# Patient Record
Sex: MALE | Race: WHITE | NOT HISPANIC OR LATINO
[De-identification: names, ages, dates, MRNs, and addresses within clinical notes are randomized per-mention and may not be internally consistent; named-entity substitution may affect disease eponyms.]

---

## 2017-08-22 ENCOUNTER — APPOINTMENT (OUTPATIENT)
Dept: UROLOGY | Facility: CLINIC | Age: 67
End: 2017-08-22
Payer: MEDICARE

## 2017-08-22 VITALS
BODY MASS INDEX: 23.86 KG/M2 | SYSTOLIC BLOOD PRESSURE: 114 MMHG | DIASTOLIC BLOOD PRESSURE: 72 MMHG | WEIGHT: 180 LBS | TEMPERATURE: 98.2 F | OXYGEN SATURATION: 98 % | HEART RATE: 97 BPM | HEIGHT: 73 IN

## 2017-08-22 DIAGNOSIS — Z86.39 PERSONAL HISTORY OF OTHER ENDOCRINE, NUTRITIONAL AND METABOLIC DISEASE: ICD-10-CM

## 2017-08-22 DIAGNOSIS — R39.12 POOR URINARY STREAM: ICD-10-CM

## 2017-08-22 DIAGNOSIS — R35.1 NOCTURIA: ICD-10-CM

## 2017-08-22 PROCEDURE — 99204 OFFICE O/P NEW MOD 45 MIN: CPT

## 2017-08-23 LAB
BILIRUB UR QL STRIP: NEGATIVE
CLARITY UR: CLEAR
COLLECTION METHOD: NORMAL
GLUCOSE UR-MCNC: NEGATIVE
HCG UR QL: 1 EU/DL
HGB UR QL STRIP.AUTO: NEGATIVE
KETONES UR-MCNC: NEGATIVE
LEUKOCYTE ESTERASE UR QL STRIP: NEGATIVE
NITRITE UR QL STRIP: NEGATIVE
PH UR STRIP: 7
PROT UR STRIP-MCNC: NEGATIVE
SP GR UR STRIP: 1.02

## 2017-09-19 ENCOUNTER — TRANSCRIPTION ENCOUNTER (OUTPATIENT)
Age: 67
End: 2017-09-19

## 2017-09-19 ENCOUNTER — APPOINTMENT (OUTPATIENT)
Dept: UROLOGY | Facility: CLINIC | Age: 67
End: 2017-09-19
Payer: MEDICARE

## 2017-09-19 VITALS
SYSTOLIC BLOOD PRESSURE: 103 MMHG | TEMPERATURE: 98 F | HEART RATE: 67 BPM | DIASTOLIC BLOOD PRESSURE: 69 MMHG | OXYGEN SATURATION: 100 %

## 2017-09-19 DIAGNOSIS — Z78.9 OTHER SPECIFIED HEALTH STATUS: ICD-10-CM

## 2017-09-19 PROCEDURE — 99215 OFFICE O/P EST HI 40 MIN: CPT

## 2017-09-19 PROCEDURE — 51798 US URINE CAPACITY MEASURE: CPT

## 2017-09-19 RX ORDER — OXYBUTYNIN CHLORIDE 5 MG/1
5 TABLET ORAL
Qty: 30 | Refills: 3 | Status: DISCONTINUED | COMMUNITY
Start: 2017-08-22 | End: 2017-09-19

## 2017-11-21 ENCOUNTER — APPOINTMENT (OUTPATIENT)
Dept: UROLOGY | Facility: CLINIC | Age: 67
End: 2017-11-21

## 2017-11-21 ENCOUNTER — APPOINTMENT (OUTPATIENT)
Dept: UROLOGY | Facility: CLINIC | Age: 67
End: 2017-11-21
Payer: MEDICARE

## 2017-11-21 PROCEDURE — 99213 OFFICE O/P EST LOW 20 MIN: CPT

## 2017-12-07 ENCOUNTER — APPOINTMENT (OUTPATIENT)
Dept: DERMATOLOGY | Facility: CLINIC | Age: 67
End: 2017-12-07
Payer: MEDICARE

## 2017-12-07 VITALS — BODY MASS INDEX: 23.86 KG/M2 | HEIGHT: 73 IN | WEIGHT: 180 LBS

## 2017-12-07 DIAGNOSIS — Z87.19 PERSONAL HISTORY OF OTHER DISEASES OF THE DIGESTIVE SYSTEM: ICD-10-CM

## 2017-12-07 DIAGNOSIS — L57.8 OTHER SKIN CHANGES DUE TO CHRONIC EXPOSURE TO NONIONIZING RADIATION: ICD-10-CM

## 2017-12-07 PROCEDURE — 99203 OFFICE O/P NEW LOW 30 MIN: CPT

## 2017-12-07 RX ORDER — SILDENAFIL 20 MG/1
20 TABLET ORAL
Qty: 20 | Refills: 3 | Status: DISCONTINUED | COMMUNITY
Start: 2017-08-22 | End: 2017-12-07

## 2017-12-07 RX ORDER — TAMSULOSIN HYDROCHLORIDE 0.4 MG/1
0.4 CAPSULE ORAL
Qty: 30 | Refills: 0 | Status: DISCONTINUED | COMMUNITY
Start: 2017-08-15

## 2017-12-07 RX ORDER — BUPROPION HYDROCHLORIDE 150 MG/1
150 TABLET, FILM COATED, EXTENDED RELEASE ORAL
Qty: 90 | Refills: 0 | Status: DISCONTINUED | COMMUNITY
Start: 2017-08-10

## 2017-12-07 RX ORDER — SODIUM SULFATE, POTASSIUM SULFATE, MAGNESIUM SULFATE 17.5; 3.13; 1.6 G/ML; G/ML; G/ML
17.5-3.13-1.6 SOLUTION, CONCENTRATE ORAL
Qty: 354 | Refills: 0 | Status: COMPLETED | COMMUNITY
Start: 2017-07-27

## 2017-12-07 RX ORDER — PANTOPRAZOLE 40 MG/1
40 TABLET, DELAYED RELEASE ORAL
Qty: 90 | Refills: 0 | Status: DISCONTINUED | COMMUNITY
Start: 2017-08-30 | End: 2017-12-07

## 2017-12-20 ENCOUNTER — APPOINTMENT (OUTPATIENT)
Dept: UROLOGY | Facility: HOSPITAL | Age: 67
End: 2017-12-20

## 2017-12-28 ENCOUNTER — OTHER (OUTPATIENT)
Age: 67
End: 2017-12-28

## 2018-01-10 ENCOUNTER — OTHER (OUTPATIENT)
Age: 68
End: 2018-01-10

## 2018-01-10 ENCOUNTER — APPOINTMENT (OUTPATIENT)
Dept: UROLOGY | Facility: HOSPITAL | Age: 68
End: 2018-01-10

## 2018-01-18 ENCOUNTER — APPOINTMENT (OUTPATIENT)
Dept: UROLOGY | Facility: CLINIC | Age: 68
End: 2018-01-18

## 2018-01-29 ENCOUNTER — APPOINTMENT (OUTPATIENT)
Dept: UROLOGY | Facility: CLINIC | Age: 68
End: 2018-01-29
Payer: MEDICARE

## 2018-01-29 VITALS
HEART RATE: 71 BPM | TEMPERATURE: 98.1 F | SYSTOLIC BLOOD PRESSURE: 112 MMHG | OXYGEN SATURATION: 100 % | DIASTOLIC BLOOD PRESSURE: 73 MMHG

## 2018-01-29 DIAGNOSIS — R21 RASH AND OTHER NONSPECIFIC SKIN ERUPTION: ICD-10-CM

## 2018-01-29 PROCEDURE — 99213 OFFICE O/P EST LOW 20 MIN: CPT

## 2018-01-30 ENCOUNTER — APPOINTMENT (OUTPATIENT)
Dept: UROLOGY | Facility: CLINIC | Age: 68
End: 2018-01-30
Payer: MEDICARE

## 2018-01-30 VITALS
RESPIRATION RATE: 16 BRPM | SYSTOLIC BLOOD PRESSURE: 101 MMHG | HEART RATE: 64 BPM | OXYGEN SATURATION: 99 % | TEMPERATURE: 98.1 F | DIASTOLIC BLOOD PRESSURE: 70 MMHG

## 2018-01-30 DIAGNOSIS — N52.9 MALE ERECTILE DYSFUNCTION, UNSPECIFIED: ICD-10-CM

## 2018-01-30 PROCEDURE — 99213 OFFICE O/P EST LOW 20 MIN: CPT

## 2018-02-02 PROBLEM — N52.9 ERECTILE DYSFUNCTION OF ORGANIC ORIGIN: Status: ACTIVE | Noted: 2017-08-22

## 2018-05-02 ENCOUNTER — APPOINTMENT (OUTPATIENT)
Dept: DERMATOLOGY | Facility: CLINIC | Age: 68
End: 2018-05-02

## 2018-05-24 ENCOUNTER — APPOINTMENT (OUTPATIENT)
Dept: DERMATOLOGY | Facility: CLINIC | Age: 68
End: 2018-05-24
Payer: MEDICARE

## 2018-05-24 DIAGNOSIS — L25.5 UNSPECIFIED CONTACT DERMATITIS DUE TO PLANTS, EXCEPT FOOD: ICD-10-CM

## 2018-05-24 PROCEDURE — 99213 OFFICE O/P EST LOW 20 MIN: CPT

## 2018-05-24 RX ORDER — BUPROPION HYDROCHLORIDE 300 MG/1
300 TABLET, EXTENDED RELEASE ORAL
Qty: 90 | Refills: 0 | Status: DISCONTINUED | COMMUNITY
Start: 2017-09-14 | End: 2018-05-24

## 2018-05-24 RX ORDER — FAMOTIDINE 20 MG/1
20 TABLET, FILM COATED ORAL
Qty: 180 | Refills: 0 | Status: DISCONTINUED | COMMUNITY
Start: 2017-11-16 | End: 2018-05-24

## 2018-08-20 ENCOUNTER — APPOINTMENT (OUTPATIENT)
Dept: DERMATOLOGY | Facility: CLINIC | Age: 68
End: 2018-08-20
Payer: MEDICARE

## 2018-08-20 VITALS — BODY MASS INDEX: 23.19 KG/M2 | WEIGHT: 175 LBS | HEIGHT: 73 IN

## 2018-08-20 DIAGNOSIS — L81.9 DISORDER OF PIGMENTATION, UNSPECIFIED: ICD-10-CM

## 2018-08-20 PROCEDURE — 99213 OFFICE O/P EST LOW 20 MIN: CPT

## 2018-08-28 ENCOUNTER — OUTPATIENT (OUTPATIENT)
Dept: OUTPATIENT SERVICES | Facility: HOSPITAL | Age: 68
LOS: 1 days | Discharge: HOME | End: 2018-08-28
Payer: COMMERCIAL

## 2018-08-28 VITALS
DIASTOLIC BLOOD PRESSURE: 73 MMHG | RESPIRATION RATE: 14 BRPM | HEART RATE: 69 BPM | HEIGHT: 73 IN | SYSTOLIC BLOOD PRESSURE: 107 MMHG | TEMPERATURE: 98 F | OXYGEN SATURATION: 100 % | WEIGHT: 179.9 LBS

## 2018-08-28 DIAGNOSIS — Z98.890 OTHER SPECIFIED POSTPROCEDURAL STATES: Chronic | ICD-10-CM

## 2018-08-28 DIAGNOSIS — M51.26 OTHER INTERVERTEBRAL DISC DISPLACEMENT, LUMBAR REGION: ICD-10-CM

## 2018-08-28 DIAGNOSIS — Z41.9 ENCOUNTER FOR PROCEDURE FOR PURPOSES OTHER THAN REMEDYING HEALTH STATE, UNSPECIFIED: Chronic | ICD-10-CM

## 2018-08-28 DIAGNOSIS — M48.061 SPINAL STENOSIS, LUMBAR REGION WITHOUT NEUROGENIC CLAUDICATION: ICD-10-CM

## 2018-08-28 DIAGNOSIS — M54.16 RADICULOPATHY, LUMBAR REGION: ICD-10-CM

## 2018-08-28 DIAGNOSIS — Z29.9 ENCOUNTER FOR PROPHYLACTIC MEASURES, UNSPECIFIED: ICD-10-CM

## 2018-08-28 LAB
ABO RH CONFIRMATION: SIGNIFICANT CHANGE UP
ANION GAP SERPL CALC-SCNC: 5 MMOL/L — SIGNIFICANT CHANGE UP (ref 5–17)
APPEARANCE UR: CLEAR — SIGNIFICANT CHANGE UP
APTT BLD: 28.3 SEC — SIGNIFICANT CHANGE UP (ref 27.5–37.4)
BASOPHILS # BLD AUTO: 0.1 K/UL — SIGNIFICANT CHANGE UP (ref 0–0.2)
BASOPHILS NFR BLD AUTO: 1.4 % — SIGNIFICANT CHANGE UP (ref 0–2)
BILIRUB UR-MCNC: NEGATIVE — SIGNIFICANT CHANGE UP
BUN SERPL-MCNC: 20 MG/DL — SIGNIFICANT CHANGE UP (ref 7–23)
CALCIUM SERPL-MCNC: 9.6 MG/DL — SIGNIFICANT CHANGE UP (ref 8.5–10.1)
CHLORIDE SERPL-SCNC: 107 MMOL/L — SIGNIFICANT CHANGE UP (ref 96–108)
CO2 SERPL-SCNC: 27 MMOL/L — SIGNIFICANT CHANGE UP (ref 22–31)
COLOR SPEC: YELLOW — SIGNIFICANT CHANGE UP
CREAT SERPL-MCNC: 1.14 MG/DL — SIGNIFICANT CHANGE UP (ref 0.5–1.3)
DIFF PNL FLD: NEGATIVE — SIGNIFICANT CHANGE UP
EOSINOPHIL # BLD AUTO: 0.2 K/UL — SIGNIFICANT CHANGE UP (ref 0–0.5)
EOSINOPHIL NFR BLD AUTO: 2.8 % — SIGNIFICANT CHANGE UP (ref 0–6)
GLUCOSE SERPL-MCNC: 83 MG/DL — SIGNIFICANT CHANGE UP (ref 70–99)
GLUCOSE UR QL: NEGATIVE MG/DL — SIGNIFICANT CHANGE UP
HCT VFR BLD CALC: 45.2 % — SIGNIFICANT CHANGE UP (ref 39–50)
HGB BLD-MCNC: 15.3 G/DL — SIGNIFICANT CHANGE UP (ref 13–17)
IMM GRANULOCYTES NFR BLD AUTO: 0.8 % — SIGNIFICANT CHANGE UP (ref 0–1.5)
INR BLD: 1.05 RATIO — SIGNIFICANT CHANGE UP (ref 0.88–1.16)
KETONES UR-MCNC: ABNORMAL
LEUKOCYTE ESTERASE UR-ACNC: ABNORMAL
LYMPHOCYTES # BLD AUTO: 1.34 K/UL — SIGNIFICANT CHANGE UP (ref 1–3.3)
LYMPHOCYTES # BLD AUTO: 18.7 % — SIGNIFICANT CHANGE UP (ref 13–44)
MCHC RBC-ENTMCNC: 31.1 PG — SIGNIFICANT CHANGE UP (ref 27–34)
MCHC RBC-ENTMCNC: 33.8 GM/DL — SIGNIFICANT CHANGE UP (ref 32–36)
MCV RBC AUTO: 91.9 FL — SIGNIFICANT CHANGE UP (ref 80–100)
MONOCYTES # BLD AUTO: 0.73 K/UL — SIGNIFICANT CHANGE UP (ref 0–0.9)
MONOCYTES NFR BLD AUTO: 10.2 % — SIGNIFICANT CHANGE UP (ref 2–14)
MRSA PCR RESULT.: SIGNIFICANT CHANGE UP
NEUTROPHILS # BLD AUTO: 4.75 K/UL — SIGNIFICANT CHANGE UP (ref 1.8–7.4)
NEUTROPHILS NFR BLD AUTO: 66.1 % — SIGNIFICANT CHANGE UP (ref 43–77)
NITRITE UR-MCNC: NEGATIVE — SIGNIFICANT CHANGE UP
PH UR: 5 — SIGNIFICANT CHANGE UP (ref 5–8)
PLATELET # BLD AUTO: 214 K/UL — SIGNIFICANT CHANGE UP (ref 150–400)
POTASSIUM SERPL-MCNC: 4 MMOL/L — SIGNIFICANT CHANGE UP (ref 3.5–5.3)
POTASSIUM SERPL-SCNC: 4 MMOL/L — SIGNIFICANT CHANGE UP (ref 3.5–5.3)
PROT UR-MCNC: 15 MG/DL
PROTHROM AB SERPL-ACNC: 11.4 SEC — SIGNIFICANT CHANGE UP (ref 9.8–12.7)
RBC # BLD: 4.92 M/UL — SIGNIFICANT CHANGE UP (ref 4.2–5.8)
RBC # FLD: 13.5 % — SIGNIFICANT CHANGE UP (ref 10.3–14.5)
S AUREUS DNA NOSE QL NAA+PROBE: SIGNIFICANT CHANGE UP
SODIUM SERPL-SCNC: 139 MMOL/L — SIGNIFICANT CHANGE UP (ref 135–145)
SP GR SPEC: 1.02 — SIGNIFICANT CHANGE UP (ref 1.01–1.02)
TYPE + AB SCN PNL BLD: SIGNIFICANT CHANGE UP
UROBILINOGEN FLD QL: NEGATIVE MG/DL — SIGNIFICANT CHANGE UP
WBC # BLD: 7.18 K/UL — SIGNIFICANT CHANGE UP (ref 3.8–10.5)
WBC # FLD AUTO: 7.18 K/UL — SIGNIFICANT CHANGE UP (ref 3.8–10.5)

## 2018-08-28 PROCEDURE — 93010 ELECTROCARDIOGRAM REPORT: CPT

## 2018-08-28 PROCEDURE — 71046 X-RAY EXAM CHEST 2 VIEWS: CPT | Mod: 26

## 2018-08-28 NOTE — H&P PST ADULT - ASSESSMENT
69 yo male scheduled for L2-4 laminectomies, fusion & related procedures on 18  with Dr. Claire    1. Labs as per surgeon  2. EKG  3. Medical clearance with PCP Dr John Baptiste  4. discussed EZ sponges, mupirocin & day of procedure instructions  5. CXR    CAPRINI SCORE [CLOT]    AGE RELATED RISK FACTORS                                                       MOBILITY RELATED FACTORS  [ ] Age 41-60 years                                            (1 Point)                  [ ] Bed rest                                                        (1 Point)  [x ] Age: 61-74 years                                           (2 Points)                 [ ] Plaster cast                                                   (2 Points)  [ ] Age= 75 years                                              (3 Points)                 [ ] Bed bound for more than 72 hours                 (2 Points)    DISEASE RELATED RISK FACTORS                                               GENDER SPECIFIC FACTORS  [ ] Edema in the lower extremities                       (1 Point)                  [ ] Pregnancy                                                     (1 Point)  [ ] Varicose veins                                               (1 Point)                  [ ] Post-partum < 6 weeks                                   (1 Point)             [ ] BMI > 25 Kg/m2                                            (1 Point)                  [ ] Hormonal therapy  or oral contraception          (1 Point)                 [ ] Sepsis (in the previous month)                        (1 Point)                  [ ] History of pregnancy complications                 (1 point)  [ ] Pneumonia or serious lung disease                                               [ ] Unexplained or recurrent                     (1 Point)           (in the previous month)                               (1 Point)  [ ] Abnormal pulmonary function test                     (1 Point)                 SURGERY RELATED RISK FACTORS  [ ] Acute myocardial infarction                              (1 Point)                 [ ]  Section                                             (1 Point)  [ ] Congestive heart failure (in the previous month)  (1 Point)               [ ] Minor surgery                                                  (1 Point)   [ ] Inflammatory bowel disease                             (1 Point)                 [ ] Arthroscopic surgery                                        (2 Points)  [ ] Central venous access                                      (2 Points)                [x ] General surgery lasting more than 45 minutes   (2 Points)       [ ] Stroke (in the previous month)                          (5 Points)               [ ] Elective arthroplasty                                         (5 Points)                                                                                                                                               HEMATOLOGY RELATED FACTORS                                                 TRAUMA RELATED RISK FACTORS  [ ] Prior episodes of VTE                                     (3 Points)                [ ] Fracture of the hip, pelvis, or leg                       (5 Points)  [ ] Positive family history for VTE                         (3 Points)                 [ ] Acute spinal cord injury (in the previous month)  (5 Points)  [ ] Prothrombin 31907 A                                     (3 Points)                 [ ] Paralysis  (less than 1 month)                             (5 Points)  [ ] Factor V Leiden                                             (3 Points)                  [ ] Multiple Trauma within 1 month                        (5 Points)  [ ] Lupus anticoagulants                                     (3 Points)                                                           [ ] Anticardiolipin antibodies                               (3 Points)                                                       [ ] High homocysteine in the blood                      (3 Points)                                             [ ] Other congenital or acquired thrombophilia      (3 Points)                                                [ ] Heparin induced thrombocytopenia                  (3 Points)                                          Total Score [     4     ]

## 2018-08-28 NOTE — H&P PST ADULT - HISTORY OF PRESENT ILLNESS
67 yo male presents to PST. c/o back pain that radiates into left leg. Reports having lumbar PEREZ with temporary relief. Reports back pain 7/10 that increases with "everything". Reports pain managed with vicodin prn.

## 2018-08-28 NOTE — H&P PST ADULT - GASTROINTESTINAL COMMENTS
Reports GERD & hiatal hernia managed with pepcid. Reports constipation due to narcotics managed with miralax

## 2018-08-28 NOTE — H&P PST ADULT - CONSTITUTIONAL COMMENTS
presents in wheelchair. Ambulates with cane, slow steady gait. Requires frequent repositioning on exam table for comfot.

## 2018-08-28 NOTE — H&P PST ADULT - PMH
Anxiety disorder    Back pain    BPH (benign prostatic hyperplasia)    Bundle branch block    Constipation    Depression    GERD (gastroesophageal reflux disease)    Hiatal hernia    Hypothyroid    Lumbar disc herniation    Lumbar radiculopathy    Mitral stenosis    Renal cyst

## 2018-08-29 LAB
CULTURE RESULTS: NO GROWTH — SIGNIFICANT CHANGE UP
SPECIMEN SOURCE: SIGNIFICANT CHANGE UP

## 2018-09-04 RX ORDER — SODIUM CHLORIDE 9 MG/ML
3 INJECTION INTRAMUSCULAR; INTRAVENOUS; SUBCUTANEOUS EVERY 8 HOURS
Qty: 0 | Refills: 0 | Status: DISCONTINUED | OUTPATIENT
Start: 2018-09-05 | End: 2018-09-11

## 2018-09-04 RX ORDER — HYDROMORPHONE HYDROCHLORIDE 2 MG/ML
0.5 INJECTION INTRAMUSCULAR; INTRAVENOUS; SUBCUTANEOUS
Qty: 0 | Refills: 0 | Status: DISCONTINUED | OUTPATIENT
Start: 2018-09-05 | End: 2018-09-06

## 2018-09-04 RX ORDER — NALOXONE HYDROCHLORIDE 4 MG/.1ML
0.1 SPRAY NASAL
Qty: 0 | Refills: 0 | Status: DISCONTINUED | OUTPATIENT
Start: 2018-09-05 | End: 2018-09-11

## 2018-09-04 RX ORDER — HYDROMORPHONE HYDROCHLORIDE 2 MG/ML
30 INJECTION INTRAMUSCULAR; INTRAVENOUS; SUBCUTANEOUS
Qty: 0 | Refills: 0 | Status: DISCONTINUED | OUTPATIENT
Start: 2018-09-05 | End: 2018-09-06

## 2018-09-04 RX ORDER — ONDANSETRON 8 MG/1
4 TABLET, FILM COATED ORAL EVERY 6 HOURS
Qty: 0 | Refills: 0 | Status: DISCONTINUED | OUTPATIENT
Start: 2018-09-05 | End: 2018-09-11

## 2018-09-04 RX ORDER — DIPHENHYDRAMINE HCL 50 MG
25 CAPSULE ORAL EVERY 4 HOURS
Qty: 0 | Refills: 0 | Status: DISCONTINUED | OUTPATIENT
Start: 2018-09-05 | End: 2018-09-11

## 2018-09-04 RX ORDER — PROCHLORPERAZINE MALEATE 5 MG
10 TABLET ORAL EVERY 6 HOURS
Qty: 0 | Refills: 0 | Status: DISCONTINUED | OUTPATIENT
Start: 2018-09-05 | End: 2018-09-11

## 2018-09-05 ENCOUNTER — INPATIENT (INPATIENT)
Facility: HOSPITAL | Age: 68
LOS: 5 days | Discharge: HOME HEALTH | End: 2018-09-11
Attending: ORTHOPAEDIC SURGERY | Admitting: ORTHOPAEDIC SURGERY
Payer: COMMERCIAL

## 2018-09-05 ENCOUNTER — RESULT REVIEW (OUTPATIENT)
Age: 68
End: 2018-09-05

## 2018-09-05 VITALS
HEIGHT: 73 IN | HEART RATE: 69 BPM | RESPIRATION RATE: 16 BRPM | TEMPERATURE: 98 F | WEIGHT: 179.9 LBS | OXYGEN SATURATION: 99 % | SYSTOLIC BLOOD PRESSURE: 109 MMHG | DIASTOLIC BLOOD PRESSURE: 74 MMHG

## 2018-09-05 DIAGNOSIS — Z41.9 ENCOUNTER FOR PROCEDURE FOR PURPOSES OTHER THAN REMEDYING HEALTH STATE, UNSPECIFIED: Chronic | ICD-10-CM

## 2018-09-05 DIAGNOSIS — Z98.890 OTHER SPECIFIED POSTPROCEDURAL STATES: Chronic | ICD-10-CM

## 2018-09-05 PROCEDURE — 88304 TISSUE EXAM BY PATHOLOGIST: CPT | Mod: 26

## 2018-09-05 RX ORDER — FOLIC ACID 0.8 MG
1 TABLET ORAL DAILY
Qty: 0 | Refills: 0 | Status: DISCONTINUED | OUTPATIENT
Start: 2018-09-05 | End: 2018-09-11

## 2018-09-05 RX ORDER — ASCORBIC ACID 60 MG
500 TABLET,CHEWABLE ORAL
Qty: 0 | Refills: 0 | Status: DISCONTINUED | OUTPATIENT
Start: 2018-09-05 | End: 2018-09-11

## 2018-09-05 RX ORDER — ONDANSETRON 8 MG/1
4 TABLET, FILM COATED ORAL EVERY 6 HOURS
Qty: 0 | Refills: 0 | Status: DISCONTINUED | OUTPATIENT
Start: 2018-09-05 | End: 2018-09-11

## 2018-09-05 RX ORDER — LEVOTHYROXINE SODIUM 125 MCG
50 TABLET ORAL
Qty: 0 | Refills: 0 | Status: COMPLETED | OUTPATIENT
Start: 2018-09-05 | End: 2018-09-11

## 2018-09-05 RX ORDER — BUPROPION HYDROCHLORIDE 150 MG/1
300 TABLET, EXTENDED RELEASE ORAL DAILY
Qty: 0 | Refills: 0 | Status: DISCONTINUED | OUTPATIENT
Start: 2018-09-05 | End: 2018-09-11

## 2018-09-05 RX ORDER — CLONAZEPAM 1 MG
0.5 TABLET ORAL AT BEDTIME
Qty: 0 | Refills: 0 | Status: DISCONTINUED | OUTPATIENT
Start: 2018-09-05 | End: 2018-09-11

## 2018-09-05 RX ORDER — SODIUM CHLORIDE 9 MG/ML
1000 INJECTION, SOLUTION INTRAVENOUS
Qty: 0 | Refills: 0 | Status: DISCONTINUED | OUTPATIENT
Start: 2018-09-05 | End: 2018-09-05

## 2018-09-05 RX ORDER — CYCLOBENZAPRINE HYDROCHLORIDE 10 MG/1
10 TABLET, FILM COATED ORAL EVERY 8 HOURS
Qty: 0 | Refills: 0 | Status: DISCONTINUED | OUTPATIENT
Start: 2018-09-05 | End: 2018-09-11

## 2018-09-05 RX ORDER — POLYETHYLENE GLYCOL 3350 17 G/17G
17 POWDER, FOR SOLUTION ORAL DAILY
Qty: 0 | Refills: 0 | Status: DISCONTINUED | OUTPATIENT
Start: 2018-09-05 | End: 2018-09-11

## 2018-09-05 RX ORDER — BUPROPION HYDROCHLORIDE 150 MG/1
1 TABLET, EXTENDED RELEASE ORAL
Qty: 0 | Refills: 0 | COMMUNITY

## 2018-09-05 RX ORDER — LEVOTHYROXINE SODIUM 125 MCG
25 TABLET ORAL
Qty: 0 | Refills: 0 | Status: COMPLETED | OUTPATIENT
Start: 2018-09-05 | End: 2018-09-10

## 2018-09-05 RX ORDER — FAMOTIDINE 10 MG/ML
20 INJECTION INTRAVENOUS ONCE
Qty: 0 | Refills: 0 | Status: COMPLETED | OUTPATIENT
Start: 2018-09-05 | End: 2018-09-05

## 2018-09-05 RX ORDER — OXYCODONE HYDROCHLORIDE 5 MG/1
10 TABLET ORAL ONCE
Qty: 0 | Refills: 0 | Status: DISCONTINUED | OUTPATIENT
Start: 2018-09-05 | End: 2018-09-05

## 2018-09-05 RX ORDER — ACETAMINOPHEN 500 MG
650 TABLET ORAL EVERY 6 HOURS
Qty: 0 | Refills: 0 | Status: DISCONTINUED | OUTPATIENT
Start: 2018-09-05 | End: 2018-09-11

## 2018-09-05 RX ORDER — DOCUSATE SODIUM 100 MG
100 CAPSULE ORAL THREE TIMES A DAY
Qty: 0 | Refills: 0 | Status: DISCONTINUED | OUTPATIENT
Start: 2018-09-05 | End: 2018-09-11

## 2018-09-05 RX ORDER — MAGNESIUM HYDROXIDE 400 MG/1
30 TABLET, CHEWABLE ORAL EVERY 12 HOURS
Qty: 0 | Refills: 0 | Status: DISCONTINUED | OUTPATIENT
Start: 2018-09-05 | End: 2018-09-11

## 2018-09-05 RX ORDER — OXYCODONE HYDROCHLORIDE 5 MG/1
5 TABLET ORAL ONCE
Qty: 0 | Refills: 0 | Status: DISCONTINUED | OUTPATIENT
Start: 2018-09-05 | End: 2018-09-05

## 2018-09-05 RX ORDER — HYDROMORPHONE HYDROCHLORIDE 2 MG/ML
0.5 INJECTION INTRAMUSCULAR; INTRAVENOUS; SUBCUTANEOUS
Qty: 0 | Refills: 0 | Status: DISCONTINUED | OUTPATIENT
Start: 2018-09-05 | End: 2018-09-05

## 2018-09-05 RX ORDER — FAMOTIDINE 10 MG/ML
20 INJECTION INTRAVENOUS
Qty: 0 | Refills: 0 | Status: DISCONTINUED | OUTPATIENT
Start: 2018-09-05 | End: 2018-09-10

## 2018-09-05 RX ORDER — INFLUENZA VIRUS VACCINE 15; 15; 15; 15 UG/.5ML; UG/.5ML; UG/.5ML; UG/.5ML
0.5 SUSPENSION INTRAMUSCULAR ONCE
Qty: 0 | Refills: 0 | Status: COMPLETED | OUTPATIENT
Start: 2018-09-05 | End: 2018-09-05

## 2018-09-05 RX ORDER — SODIUM CHLORIDE 9 MG/ML
1000 INJECTION, SOLUTION INTRAVENOUS
Qty: 0 | Refills: 0 | Status: DISCONTINUED | OUTPATIENT
Start: 2018-09-05 | End: 2018-09-07

## 2018-09-05 RX ORDER — SENNA PLUS 8.6 MG/1
2 TABLET ORAL AT BEDTIME
Qty: 0 | Refills: 0 | Status: DISCONTINUED | OUTPATIENT
Start: 2018-09-05 | End: 2018-09-11

## 2018-09-05 RX ORDER — ONDANSETRON 8 MG/1
4 TABLET, FILM COATED ORAL ONCE
Qty: 0 | Refills: 0 | Status: DISCONTINUED | OUTPATIENT
Start: 2018-09-05 | End: 2018-09-05

## 2018-09-05 RX ORDER — CEFAZOLIN SODIUM 1 G
2000 VIAL (EA) INJECTION EVERY 8 HOURS
Qty: 0 | Refills: 0 | Status: COMPLETED | OUTPATIENT
Start: 2018-09-05 | End: 2018-09-06

## 2018-09-05 RX ORDER — MEPERIDINE HYDROCHLORIDE 50 MG/ML
12.5 INJECTION INTRAMUSCULAR; INTRAVENOUS; SUBCUTANEOUS
Qty: 0 | Refills: 0 | Status: DISCONTINUED | OUTPATIENT
Start: 2018-09-05 | End: 2018-09-05

## 2018-09-05 RX ADMIN — FAMOTIDINE 20 MILLIGRAM(S): 10 INJECTION INTRAVENOUS at 16:50

## 2018-09-05 RX ADMIN — OXYCODONE HYDROCHLORIDE 10 MILLIGRAM(S): 5 TABLET ORAL at 07:37

## 2018-09-05 RX ADMIN — HYDROMORPHONE HYDROCHLORIDE 0.5 MILLIGRAM(S): 2 INJECTION INTRAMUSCULAR; INTRAVENOUS; SUBCUTANEOUS at 14:39

## 2018-09-05 RX ADMIN — HYDROMORPHONE HYDROCHLORIDE 30 MILLILITER(S): 2 INJECTION INTRAMUSCULAR; INTRAVENOUS; SUBCUTANEOUS at 13:57

## 2018-09-05 RX ADMIN — SODIUM CHLORIDE 100 MILLILITER(S): 9 INJECTION, SOLUTION INTRAVENOUS at 15:10

## 2018-09-05 RX ADMIN — Medication 100 MILLIGRAM(S): at 21:43

## 2018-09-05 RX ADMIN — FAMOTIDINE 20 MILLIGRAM(S): 10 INJECTION INTRAVENOUS at 07:37

## 2018-09-05 RX ADMIN — SENNA PLUS 2 TABLET(S): 8.6 TABLET ORAL at 21:43

## 2018-09-05 RX ADMIN — OXYCODONE HYDROCHLORIDE 10 MILLIGRAM(S): 5 TABLET ORAL at 07:39

## 2018-09-05 RX ADMIN — SODIUM CHLORIDE 3 MILLILITER(S): 9 INJECTION INTRAMUSCULAR; INTRAVENOUS; SUBCUTANEOUS at 21:22

## 2018-09-05 RX ADMIN — Medication 500 MILLIGRAM(S): at 16:51

## 2018-09-05 RX ADMIN — Medication 1 TABLET(S): at 21:43

## 2018-09-05 RX ADMIN — Medication 0.5 MILLIGRAM(S): at 21:43

## 2018-09-05 RX ADMIN — HYDROMORPHONE HYDROCHLORIDE 0.5 MILLIGRAM(S): 2 INJECTION INTRAMUSCULAR; INTRAVENOUS; SUBCUTANEOUS at 14:20

## 2018-09-05 NOTE — BRIEF OPERATIVE NOTE - PROCEDURE
<<-----Click on this checkbox to enter Procedure Lumbar fusion with pedicle screw  09/05/2018  L2-L5 laminectomies, instrumentation, allograft, BMP, fusion, navigation  [facility]

## 2018-09-05 NOTE — PROGRESS NOTE ADULT - SUBJECTIVE AND OBJECTIVE BOX
Orthopedic Post-op Note    Patient seen and examined at bedside, tolerated procedure well, pain controlled    Vital Signs Last 24 Hrs  T(C): 36.7 (05 Sep 2018 13:50), Max: 36.7 (05 Sep 2018 07:11)  T(F): 98 (05 Sep 2018 13:50), Max: 98.1 (05 Sep 2018 07:11)  HR: 76 (05 Sep 2018 14:00) (69 - 76)  BP: 117/83 (05 Sep 2018 14:00) (109/74 - 118/90)  BP(mean): --  RR: 15 (05 Sep 2018 14:00) (14 - 16)  SpO2: 100% (05 Sep 2018 14:00) (99% - 100%)    Physical Exam:  Gen: NAD, Resting comfortably    Spine:  Dressing clean dry intact  Moving all extremities  SILT L3-S1  +DP/PT Pulses  Compartments soft  No calf TTP B/L

## 2018-09-05 NOTE — PROGRESS NOTE ADULT - ASSESSMENT
68 year old male with L2-L5 Laminectomy/posterior spinal fusion POD#0    -Analgesia (PCA)  -Weight bearing as tolerated with TLSO brace on while ambulating  -Venodynes  -Medical Management   -Labs in AM  -Drain monitoring  -Post op antibiotics

## 2018-09-06 LAB
ANION GAP SERPL CALC-SCNC: 10 MMOL/L — SIGNIFICANT CHANGE UP (ref 5–17)
BASOPHILS # BLD AUTO: 0.05 K/UL — SIGNIFICANT CHANGE UP (ref 0–0.2)
BASOPHILS NFR BLD AUTO: 0.3 % — SIGNIFICANT CHANGE UP (ref 0–2)
BUN SERPL-MCNC: 17 MG/DL — SIGNIFICANT CHANGE UP (ref 7–23)
CALCIUM SERPL-MCNC: 8.8 MG/DL — SIGNIFICANT CHANGE UP (ref 8.5–10.1)
CHLORIDE SERPL-SCNC: 107 MMOL/L — SIGNIFICANT CHANGE UP (ref 96–108)
CO2 SERPL-SCNC: 23 MMOL/L — SIGNIFICANT CHANGE UP (ref 22–31)
CREAT SERPL-MCNC: 0.96 MG/DL — SIGNIFICANT CHANGE UP (ref 0.5–1.3)
EOSINOPHIL # BLD AUTO: 0.01 K/UL — SIGNIFICANT CHANGE UP (ref 0–0.5)
EOSINOPHIL NFR BLD AUTO: 0.1 % — SIGNIFICANT CHANGE UP (ref 0–6)
GLUCOSE SERPL-MCNC: 109 MG/DL — HIGH (ref 70–99)
HCT VFR BLD CALC: 34.9 % — LOW (ref 39–50)
HGB BLD-MCNC: 11.9 G/DL — LOW (ref 13–17)
IMM GRANULOCYTES NFR BLD AUTO: 0.9 % — SIGNIFICANT CHANGE UP (ref 0–1.5)
LYMPHOCYTES # BLD AUTO: 1.14 K/UL — SIGNIFICANT CHANGE UP (ref 1–3.3)
LYMPHOCYTES # BLD AUTO: 6.2 % — LOW (ref 13–44)
MCHC RBC-ENTMCNC: 31.1 PG — SIGNIFICANT CHANGE UP (ref 27–34)
MCHC RBC-ENTMCNC: 34.1 GM/DL — SIGNIFICANT CHANGE UP (ref 32–36)
MCV RBC AUTO: 91.1 FL — SIGNIFICANT CHANGE UP (ref 80–100)
MONOCYTES # BLD AUTO: 1.32 K/UL — HIGH (ref 0–0.9)
MONOCYTES NFR BLD AUTO: 7.1 % — SIGNIFICANT CHANGE UP (ref 2–14)
NEUTROPHILS # BLD AUTO: 15.8 K/UL — HIGH (ref 1.8–7.4)
NEUTROPHILS NFR BLD AUTO: 85.4 % — HIGH (ref 43–77)
NRBC # BLD: 0 /100 WBCS — SIGNIFICANT CHANGE UP (ref 0–0)
PLATELET # BLD AUTO: 172 K/UL — SIGNIFICANT CHANGE UP (ref 150–400)
POTASSIUM SERPL-MCNC: 4.4 MMOL/L — SIGNIFICANT CHANGE UP (ref 3.5–5.3)
POTASSIUM SERPL-SCNC: 4.4 MMOL/L — SIGNIFICANT CHANGE UP (ref 3.5–5.3)
RBC # BLD: 3.83 M/UL — LOW (ref 4.2–5.8)
RBC # FLD: 13.5 % — SIGNIFICANT CHANGE UP (ref 10.3–14.5)
SODIUM SERPL-SCNC: 140 MMOL/L — SIGNIFICANT CHANGE UP (ref 135–145)
WBC # BLD: 18.48 K/UL — HIGH (ref 3.8–10.5)
WBC # FLD AUTO: 18.48 K/UL — HIGH (ref 3.8–10.5)

## 2018-09-06 RX ORDER — OXYCODONE AND ACETAMINOPHEN 5; 325 MG/1; MG/1
2 TABLET ORAL EVERY 4 HOURS
Qty: 0 | Refills: 0 | Status: DISCONTINUED | OUTPATIENT
Start: 2018-09-06 | End: 2018-09-07

## 2018-09-06 RX ORDER — HYDROMORPHONE HYDROCHLORIDE 2 MG/ML
1 INJECTION INTRAMUSCULAR; INTRAVENOUS; SUBCUTANEOUS EVERY 6 HOURS
Qty: 0 | Refills: 0 | Status: DISCONTINUED | OUTPATIENT
Start: 2018-09-06 | End: 2018-09-10

## 2018-09-06 RX ADMIN — OXYCODONE AND ACETAMINOPHEN 2 TABLET(S): 5; 325 TABLET ORAL at 17:10

## 2018-09-06 RX ADMIN — SODIUM CHLORIDE 3 MILLILITER(S): 9 INJECTION INTRAMUSCULAR; INTRAVENOUS; SUBCUTANEOUS at 21:55

## 2018-09-06 RX ADMIN — Medication 1 TABLET(S): at 13:29

## 2018-09-06 RX ADMIN — SENNA PLUS 2 TABLET(S): 8.6 TABLET ORAL at 22:58

## 2018-09-06 RX ADMIN — Medication 1 TABLET(S): at 22:58

## 2018-09-06 RX ADMIN — FAMOTIDINE 20 MILLIGRAM(S): 10 INJECTION INTRAVENOUS at 06:53

## 2018-09-06 RX ADMIN — Medication 1 TABLET(S): at 05:29

## 2018-09-06 RX ADMIN — SODIUM CHLORIDE 3 MILLILITER(S): 9 INJECTION INTRAMUSCULAR; INTRAVENOUS; SUBCUTANEOUS at 13:31

## 2018-09-06 RX ADMIN — Medication 100 MILLIGRAM(S): at 05:29

## 2018-09-06 RX ADMIN — Medication 100 MILLIGRAM(S): at 22:58

## 2018-09-06 RX ADMIN — Medication 1 MILLIGRAM(S): at 10:20

## 2018-09-06 RX ADMIN — OXYCODONE AND ACETAMINOPHEN 2 TABLET(S): 5; 325 TABLET ORAL at 23:29

## 2018-09-06 RX ADMIN — Medication 100 MILLIGRAM(S): at 05:27

## 2018-09-06 RX ADMIN — OXYCODONE AND ACETAMINOPHEN 2 TABLET(S): 5; 325 TABLET ORAL at 17:09

## 2018-09-06 RX ADMIN — Medication 500 MILLIGRAM(S): at 05:29

## 2018-09-06 RX ADMIN — OXYCODONE AND ACETAMINOPHEN 2 TABLET(S): 5; 325 TABLET ORAL at 15:30

## 2018-09-06 RX ADMIN — Medication 25 MICROGRAM(S): at 05:28

## 2018-09-06 RX ADMIN — CYCLOBENZAPRINE HYDROCHLORIDE 10 MILLIGRAM(S): 10 TABLET, FILM COATED ORAL at 22:58

## 2018-09-06 RX ADMIN — Medication 0.5 MILLIGRAM(S): at 22:58

## 2018-09-06 RX ADMIN — Medication 1 TABLET(S): at 10:20

## 2018-09-06 RX ADMIN — OXYCODONE AND ACETAMINOPHEN 2 TABLET(S): 5; 325 TABLET ORAL at 22:59

## 2018-09-06 RX ADMIN — FAMOTIDINE 20 MILLIGRAM(S): 10 INJECTION INTRAVENOUS at 17:09

## 2018-09-06 RX ADMIN — CYCLOBENZAPRINE HYDROCHLORIDE 10 MILLIGRAM(S): 10 TABLET, FILM COATED ORAL at 05:27

## 2018-09-06 RX ADMIN — SODIUM CHLORIDE 3 MILLILITER(S): 9 INJECTION INTRAMUSCULAR; INTRAVENOUS; SUBCUTANEOUS at 05:12

## 2018-09-06 RX ADMIN — BUPROPION HYDROCHLORIDE 300 MILLIGRAM(S): 150 TABLET, EXTENDED RELEASE ORAL at 06:53

## 2018-09-06 RX ADMIN — Medication 500 MILLIGRAM(S): at 17:09

## 2018-09-06 RX ADMIN — OXYCODONE AND ACETAMINOPHEN 2 TABLET(S): 5; 325 TABLET ORAL at 13:28

## 2018-09-06 NOTE — CONSULT NOTE ADULT - REASON FOR ADMISSION
I had my surgery   Lumbar fusion with pedicle screw  09/05/2018  L2-L5 laminectomies, instrumentation, allograft, BMP, fusion, navigation  [facility]

## 2018-09-06 NOTE — PHYSICAL THERAPY INITIAL EVALUATION ADULT - ADDITIONAL COMMENTS
has 2x7 steps with HR between levels of home, spends a lot of time on ground level ,7 steps up to main level/kitchen and another 7 steps to bedroom, has 1 comfort height toilet in home

## 2018-09-06 NOTE — PHYSICAL THERAPY INITIAL EVALUATION ADULT - PLANNED THERAPY INTERVENTIONS, PT EVAL
postural re-education/back protection/body mechanics,deep breathing/bed mobility training/transfer training

## 2018-09-06 NOTE — CONSULT NOTE ADULT - SUBJECTIVE AND OBJECTIVE BOX
Date of Service 09-06-18 @ 07:22    Patient is a 68y old  Male who presents with a chief complaint of "I had my surgery"       HPI: Pt is a 67 y/o male with h/o anxiety, BPH, chronic LBP, GERD, hypothyroidism who has been having increasing LBP.  He was being followed by Dr Claire, failed PEREZ and all conservative measures therefore he was admitted for elective L2-L5 laminectomy with lumbar fusion.  Postop medicine consult called for comanagement.  Pt seen and examined, feels much better overall, c/o mild incisional LBP.  His initial LBP has resolved.       PAST MEDICAL & SURGICAL HISTORY:  Mitral stenosis  Bundle branch block  Renal cyst  BPH (benign prostatic hyperplasia)  Depression  Constipation  Hiatal hernia  GERD (gastroesophageal reflux disease)  Lumbar radiculopathy  Lumbar disc herniation  Back pain  Hypothyroid  Anxiety disorder  S/P colonoscopy  H/O right inguinal hernia repair: 2008  Elective surgery: lumbar PEREZ x 2      Allergies    ampicillin (Rash)    Intolerances        MEDICATIONS  (STANDING):  ascorbic acid 500 milliGRAM(s) Oral two times a day  buPROPion XL . 300 milliGRAM(s) Oral daily  calcium carbonate 1250 mG  + Vitamin D (OsCal 500 + D) 1 Tablet(s) Oral three times a day  clonazePAM Tablet 0.5 milliGRAM(s) Oral at bedtime  docusate sodium 100 milliGRAM(s) Oral three times a day  famotidine    Tablet 20 milliGRAM(s) Oral two times a day  folic acid 1 milliGRAM(s) Oral daily  HYDROmorphone PCA (1 mG/mL) 30 milliLiter(s) PCA Continuous PCA Continuous  influenza   Vaccine 0.5 milliLiter(s) IntraMuscular once  lactated ringers. 1000 milliLiter(s) (100 mL/Hr) IV Continuous <Continuous>  levothyroxine 25 MICROGram(s) Oral <User Schedule>  levothyroxine 50 MICROGram(s) Oral <User Schedule>  multivitamin 1 Tablet(s) Oral daily  polyethylene glycol 3350 17 Gram(s) Oral daily  senna 2 Tablet(s) Oral at bedtime  sodium chloride 0.9% lock flush 3 milliLiter(s) IV Push every 8 hours    MEDICATIONS  (PRN):  acetaminophen   Tablet .. 650 milliGRAM(s) Oral every 6 hours PRN Temp greater or equal to 38C (100.4F), Mild Pain (1 - 3)  aluminum hydroxide/magnesium hydroxide/simethicone Suspension 30 milliLiter(s) Oral every 12 hours PRN Indigestion  cyclobenzaprine 10 milliGRAM(s) Oral every 8 hours PRN Muscle Spasm  diphenhydrAMINE   Injectable 25 milliGRAM(s) IV Push every 4 hours PRN Pruritus  HYDROmorphone PCA (1 mG/mL) Rescue Clinician Bolus 0.5 milliGRAM(s) IV Push every 15 minutes PRN for Pain Scale GREATER THAN 6  magnesium hydroxide Suspension 30 milliLiter(s) Oral every 12 hours PRN Constipation  naloxone Injectable 0.1 milliGRAM(s) IV Push every 3 minutes PRN For ANY of the following changes in patient status:  A. RR LESS THAN 10 breaths per minute, B. Oxygen saturation LESS THAN 90%, C. Sedation score of 6  ondansetron Injectable 4 milliGRAM(s) IV Push every 6 hours PRN Nausea  ondansetron Injectable 4 milliGRAM(s) IV Push every 6 hours PRN Nausea  prochlorperazine   Injectable 10 milliGRAM(s) IV Push every 6 hours PRN Nausea      FAMILY HISTORY:  Family history of diabetes mellitus (Mother)      Social History: lives with domestic partner, denies smoking or ETOH use      Vital Signs Last 24 Hrs  T(C): 36.8 (06 Sep 2018 05:26), Max: 37 (05 Sep 2018 17:21)  T(F): 98.2 (06 Sep 2018 05:26), Max: 98.6 (05 Sep 2018 17:21)  HR: 72 (06 Sep 2018 05:26) (72 - 87)  BP: 100/54 (06 Sep 2018 05:26) (100/54 - 148/86)  BP(mean): --  RR: 16 (06 Sep 2018 05:26) (14 - 17)  SpO2: 100% (06 Sep 2018 05:26) (97% - 100%)    Daily     Daily     I&O's Summary    05 Sep 2018 07:01  -  06 Sep 2018 07:00  --------------------------------------------------------  IN: 2225 mL / OUT: 2425 mL / NET: -200 mL          Data      Complete Blood Count + Automated Diff (08.28.18 @ 09:53)    WBC Count: 7.18 K/uL    RBC Count: 4.92 M/uL    Hemoglobin: 15.3 g/dL    Hematocrit: 45.2 %    Mean Cell Volume: 91.9 fl    Mean Cell Hemoglobin: 31.1 pg    Mean Cell Hemoglobin Conc: 33.8 gm/dL    Red Cell Distrib Width: 13.5 %    Platelet Count - Automated: 214 K/uL    Auto Neutrophil #: 4.75 K/uL    Auto Lymphocyte #: 1.34 K/uL    Auto Monocyte #: 0.73 K/uL    Auto Eosinophil #: 0.20 K/uL    Auto Basophil #: 0.10 K/uL    Auto Neutrophil %: 66.1: Differential percentages must be correlated with absolute numbers for  clinical significance. %    Auto Lymphocyte %: 18.7 %    Auto Monocyte %: 10.2 %    Auto Eosinophil %: 2.8 %    Auto Basophil %: 1.4 %    Auto Immature Granulocyte %: 0.8 %    Basic Metabolic Panel (08.28.18 @ 09:53)    Sodium, Serum: 139 mmol/L    Potassium, Serum: 4.0 mmol/L    Chloride, Serum: 107 mmol/L    Carbon Dioxide, Serum: 27 mmol/L    Anion Gap, Serum: 5 mmol/L    Blood Urea Nitrogen, Serum: 20 mg/dL    Creatinine, Serum: 1.14 mg/dL    Glucose, Serum: 83 mg/dL    Calcium, Total Serum: 9.6 mg/dL    eGFR if Non : 66: Interpretative comment  The units for eGFR are ml/min/1.73m2 (normalized body surface area). The  eGFR is calculated from a serum creatinine using the CKD-EPI equation.  Other variables required for calculation are race, age and sex. Among  patients with chronic kidney disease (CKD), the eGFR is useful in  determining the stage of disease according to KDOQI CKD classification.  All eGFR results are reported numerically with the following  interpretation.          GFR                    With                 Without     (ml/min/1.73 m2)    Kidney Damage       Kidney Damage        >= 90                    Stage 1                     Normal        60-89                    Stage 2                     Decreased GFR        30-59     Stage 3                     Stage 3        15-29                    Stage 4                     Stage 4        < 15                      Stage 5                     Stage 5  Each stage of CKD assumes that the associated GFR level has been in  effect for at least 3 months. Determination of stages one and two (with  eGFR > 59 ml/min/m2) requires estimation of kidney damage for at least 3  months as defined by structural or functional abnormalities.  Limitations: All estimates of GFR will be less accurate for patients at  extremes of muscle mass (including but not limited to frail elderly,  critically ill, or cancer patients), those with unusual diets, and those  with conditions associated with reduced secretion or extrarenal  elimination of creatinine. The eGFR equation is not recommended for use  in patients with unstable creatinine levels. mL/min/1.73M2    eGFR if African American: 77 mL/min/1.73M2

## 2018-09-06 NOTE — PHYSICAL THERAPY INITIAL EVALUATION ADULT - CRITERIA FOR SKILLED THERAPEUTIC INTERVENTIONS
impairments found/anticipated discharge recommendation/functional limitations in following categories/therapy frequency/anticipated equipment needs at discharge/predicted duration of therapy intervention/risk reduction/prevention/rehab potential

## 2018-09-06 NOTE — PHYSICAL THERAPY INITIAL EVALUATION ADULT - SKIN INTEGRITY
surgical incision/Aquacel dressing to L/S C/D/I ,smaller gauze dressing to L L/S at hemovac site with dried old blood around drain exit site

## 2018-09-06 NOTE — PROGRESS NOTE ADULT - SUBJECTIVE AND OBJECTIVE BOX
Patient seen and examined at bedside. No acute events overnight. Pain controlled.    Vital Signs Last 24 Hrs  T(C): 36.8 (06 Sep 2018 05:26), Max: 37 (05 Sep 2018 17:21)  T(F): 98.2 (06 Sep 2018 05:26), Max: 98.6 (05 Sep 2018 17:21)  HR: 72 (06 Sep 2018 05:26) (72 - 87)  BP: 100/54 (06 Sep 2018 05:26) (100/54 - 148/86)  BP(mean): --  RR: 16 (06 Sep 2018 05:26) (14 - 17)  SpO2: 100% (06 Sep 2018 05:26) (97% - 100%)                          11.9   18.48 )-----------( 172      ( 06 Sep 2018 06:57 )             34.9       Physical Exam:  General: Not in acute distress, resting comfortably.     Spine:  Dressing is clean, dry, and intact. Drains intact with serosanguineous output.   Sensation intact to light touch in L2-S1 nerve distributions bilaterally  Dorsalis pedis/Posterior tibial pulses 2+  Compartments soft and compressible    Motor:     Right Lower Extremity      Left Lower Extremity               L2: 5/5                               L2: 5/5               L3: 5/5                               L3: 5/5               L4: 5/5                               L4: 5/5               L5: 5/5                               L5: 5/5               S1: 5/5                              S1: 5/5    Assessment and Plan:    68y Male s/p Posterior Spinal Fusion/laminectomy L2-L5 on 9/5/18.   - Analgesia  - DC liz when ambulating  - Incentive Spirometry  - Weight Bearing as Tolerated  - Physical Therapy, OOB-Chair  - TLSO back brace while out of bed ambulating  - Mechanical DVT Prophylaxis (Venodynes)  - Hemovac drain monitoring  - Medical management appreciated  - Discharge Planning

## 2018-09-06 NOTE — PROGRESS NOTE ADULT - SUBJECTIVE AND OBJECTIVE BOX
POD # 1 S/p L2-5 laminectomies, instrumentation fusion  Patient seen and examined, resting comfortable in bed. Patient reporting improvement in previous radicular  like symptoms.  Wound / bandage intact.   Drain with 180 cc output total x 24 hours, kept  Afebrile,  WBC 18,  Hct 34  Has not been OOB yet.   - D/C PCA , transition to Percocet po , Dilaudid IM  for breakthrough   - D/ C liz cath  - Mobilize patient OOB with brace on with physical therapy consult.

## 2018-09-06 NOTE — CONSULT NOTE ADULT - ASSESSMENT
Pt is a 67 y/o male with h/o anxiety, BPH, chronic LBP, GERD, hypothyroidism who has been having increasing LBP.  He was being followed by Dr Claire, failed PEREZ and all conservative measures therefore he was admitted for elective L2-L5 laminectomy with lumbar fusion.  Postop medicine consult called for comanagement.     * Lumbar Stenosis-failed conservative measures now s/p L2-L5 laminectomy with fusion.  Doing well, continue pain control, encourage use of incentive spirometry, monitor postop labs, DVT proph.  * Hypothyroidism-synthroid  * Anxiety-bupropion, Klonopin  * GERD- famotidine  * Proph- venodynes while drain in place  * Disp-OOB, PT, liz removal per spine surgery  * Comm- d/w pt in details, all questions answered, RN

## 2018-09-07 LAB
ANION GAP SERPL CALC-SCNC: 7 MMOL/L — SIGNIFICANT CHANGE UP (ref 5–17)
BASOPHILS # BLD AUTO: 0.09 K/UL — SIGNIFICANT CHANGE UP (ref 0–0.2)
BASOPHILS NFR BLD AUTO: 0.8 % — SIGNIFICANT CHANGE UP (ref 0–2)
BUN SERPL-MCNC: 18 MG/DL — SIGNIFICANT CHANGE UP (ref 7–23)
CALCIUM SERPL-MCNC: 9 MG/DL — SIGNIFICANT CHANGE UP (ref 8.5–10.1)
CHLORIDE SERPL-SCNC: 104 MMOL/L — SIGNIFICANT CHANGE UP (ref 96–108)
CO2 SERPL-SCNC: 31 MMOL/L — SIGNIFICANT CHANGE UP (ref 22–31)
CREAT SERPL-MCNC: 0.92 MG/DL — SIGNIFICANT CHANGE UP (ref 0.5–1.3)
EOSINOPHIL # BLD AUTO: 0.18 K/UL — SIGNIFICANT CHANGE UP (ref 0–0.5)
EOSINOPHIL NFR BLD AUTO: 1.6 % — SIGNIFICANT CHANGE UP (ref 0–6)
GLUCOSE SERPL-MCNC: 89 MG/DL — SIGNIFICANT CHANGE UP (ref 70–99)
HCT VFR BLD CALC: 33.9 % — LOW (ref 39–50)
HGB BLD-MCNC: 11.7 G/DL — LOW (ref 13–17)
IMM GRANULOCYTES NFR BLD AUTO: 0.7 % — SIGNIFICANT CHANGE UP (ref 0–1.5)
LYMPHOCYTES # BLD AUTO: 2.31 K/UL — SIGNIFICANT CHANGE UP (ref 1–3.3)
LYMPHOCYTES # BLD AUTO: 20 % — SIGNIFICANT CHANGE UP (ref 13–44)
MCHC RBC-ENTMCNC: 32 PG — SIGNIFICANT CHANGE UP (ref 27–34)
MCHC RBC-ENTMCNC: 34.5 GM/DL — SIGNIFICANT CHANGE UP (ref 32–36)
MCV RBC AUTO: 92.6 FL — SIGNIFICANT CHANGE UP (ref 80–100)
MONOCYTES # BLD AUTO: 1.33 K/UL — HIGH (ref 0–0.9)
MONOCYTES NFR BLD AUTO: 11.5 % — SIGNIFICANT CHANGE UP (ref 2–14)
NEUTROPHILS # BLD AUTO: 7.57 K/UL — HIGH (ref 1.8–7.4)
NEUTROPHILS NFR BLD AUTO: 65.4 % — SIGNIFICANT CHANGE UP (ref 43–77)
NRBC # BLD: 0 /100 WBCS — SIGNIFICANT CHANGE UP (ref 0–0)
PLATELET # BLD AUTO: 152 K/UL — SIGNIFICANT CHANGE UP (ref 150–400)
POTASSIUM SERPL-MCNC: 4 MMOL/L — SIGNIFICANT CHANGE UP (ref 3.5–5.3)
POTASSIUM SERPL-SCNC: 4 MMOL/L — SIGNIFICANT CHANGE UP (ref 3.5–5.3)
RBC # BLD: 3.66 M/UL — LOW (ref 4.2–5.8)
RBC # FLD: 13.7 % — SIGNIFICANT CHANGE UP (ref 10.3–14.5)
SODIUM SERPL-SCNC: 142 MMOL/L — SIGNIFICANT CHANGE UP (ref 135–145)
WBC # BLD: 11.56 K/UL — HIGH (ref 3.8–10.5)
WBC # FLD AUTO: 11.56 K/UL — HIGH (ref 3.8–10.5)

## 2018-09-07 RX ORDER — HYDROMORPHONE HYDROCHLORIDE 2 MG/ML
4 INJECTION INTRAMUSCULAR; INTRAVENOUS; SUBCUTANEOUS EVERY 4 HOURS
Qty: 0 | Refills: 0 | Status: DISCONTINUED | OUTPATIENT
Start: 2018-09-07 | End: 2018-09-10

## 2018-09-07 RX ORDER — HYDROMORPHONE HYDROCHLORIDE 2 MG/ML
2 INJECTION INTRAMUSCULAR; INTRAVENOUS; SUBCUTANEOUS EVERY 4 HOURS
Qty: 0 | Refills: 0 | Status: DISCONTINUED | OUTPATIENT
Start: 2018-09-07 | End: 2018-09-10

## 2018-09-07 RX ADMIN — FAMOTIDINE 20 MILLIGRAM(S): 10 INJECTION INTRAVENOUS at 18:18

## 2018-09-07 RX ADMIN — SENNA PLUS 2 TABLET(S): 8.6 TABLET ORAL at 19:57

## 2018-09-07 RX ADMIN — BUPROPION HYDROCHLORIDE 300 MILLIGRAM(S): 150 TABLET, EXTENDED RELEASE ORAL at 18:18

## 2018-09-07 RX ADMIN — FAMOTIDINE 20 MILLIGRAM(S): 10 INJECTION INTRAVENOUS at 06:44

## 2018-09-07 RX ADMIN — Medication 0.5 MILLIGRAM(S): at 19:56

## 2018-09-07 RX ADMIN — OXYCODONE AND ACETAMINOPHEN 2 TABLET(S): 5; 325 TABLET ORAL at 07:13

## 2018-09-07 RX ADMIN — ONDANSETRON 4 MILLIGRAM(S): 8 TABLET, FILM COATED ORAL at 13:18

## 2018-09-07 RX ADMIN — Medication 50 MICROGRAM(S): at 06:45

## 2018-09-07 RX ADMIN — Medication 100 MILLIGRAM(S): at 06:45

## 2018-09-07 RX ADMIN — Medication 1 TABLET(S): at 06:44

## 2018-09-07 RX ADMIN — SODIUM CHLORIDE 3 MILLILITER(S): 9 INJECTION INTRAMUSCULAR; INTRAVENOUS; SUBCUTANEOUS at 05:49

## 2018-09-07 RX ADMIN — OXYCODONE AND ACETAMINOPHEN 2 TABLET(S): 5; 325 TABLET ORAL at 15:44

## 2018-09-07 RX ADMIN — OXYCODONE AND ACETAMINOPHEN 2 TABLET(S): 5; 325 TABLET ORAL at 16:14

## 2018-09-07 RX ADMIN — HYDROMORPHONE HYDROCHLORIDE 2 MILLIGRAM(S): 2 INJECTION INTRAMUSCULAR; INTRAVENOUS; SUBCUTANEOUS at 19:57

## 2018-09-07 RX ADMIN — HYDROMORPHONE HYDROCHLORIDE 2 MILLIGRAM(S): 2 INJECTION INTRAMUSCULAR; INTRAVENOUS; SUBCUTANEOUS at 20:27

## 2018-09-07 RX ADMIN — CYCLOBENZAPRINE HYDROCHLORIDE 10 MILLIGRAM(S): 10 TABLET, FILM COATED ORAL at 06:43

## 2018-09-07 RX ADMIN — Medication 500 MILLIGRAM(S): at 06:44

## 2018-09-07 RX ADMIN — Medication 1 TABLET(S): at 19:56

## 2018-09-07 RX ADMIN — SODIUM CHLORIDE 3 MILLILITER(S): 9 INJECTION INTRAMUSCULAR; INTRAVENOUS; SUBCUTANEOUS at 13:17

## 2018-09-07 RX ADMIN — CYCLOBENZAPRINE HYDROCHLORIDE 10 MILLIGRAM(S): 10 TABLET, FILM COATED ORAL at 19:56

## 2018-09-07 RX ADMIN — SODIUM CHLORIDE 3 MILLILITER(S): 9 INJECTION INTRAMUSCULAR; INTRAVENOUS; SUBCUTANEOUS at 19:58

## 2018-09-07 RX ADMIN — OXYCODONE AND ACETAMINOPHEN 2 TABLET(S): 5; 325 TABLET ORAL at 06:43

## 2018-09-07 RX ADMIN — Medication 100 MILLIGRAM(S): at 19:56

## 2018-09-07 RX ADMIN — Medication 500 MILLIGRAM(S): at 18:18

## 2018-09-07 NOTE — PROGRESS NOTE ADULT - SUBJECTIVE AND OBJECTIVE BOX
POD#2 S/P L2-5 laminectomies, fusion instrumentation  Walked with PT yesterday, stood with PT today  Feels the percocets make him dizzy and give nausea  No longer has the LE pain that he had pre op  Moderate pain in the L spine with movement    AF, VSS  Dressing dry and intact  5/5 strength in the legs  Sensation intact legs    A/P  All questions answered  DC Percocet, started Dilaudid PO  Cont PT  Wife at the bedside

## 2018-09-07 NOTE — PROGRESS NOTE ADULT - SUBJECTIVE AND OBJECTIVE BOX
Pt overall feels better, c/o LBP from surgery which is slowly improving.  Working with PT, no CP or SOB.    Date of Service: 09-07-18 @ 08:44    Vital Signs Last 24 Hrs  T(C): 37.2 (07 Sep 2018 05:47), Max: 37.2 (07 Sep 2018 00:07)  T(F): 99 (07 Sep 2018 05:47), Max: 99 (07 Sep 2018 05:47)  HR: 80 (07 Sep 2018 05:47) (80 - 84)  BP: 93/46 (07 Sep 2018 05:47) (93/46 - 108/59)  BP(mean): 69 (06 Sep 2018 11:19) (69 - 69)  RR: 16 (07 Sep 2018 05:47) (16 - 16)  SpO2: 98% (07 Sep 2018 05:47) (98% - 100%)    Daily     Daily     I&O's Detail    06 Sep 2018 07:01  -  07 Sep 2018 07:00  --------------------------------------------------------  IN:  Total IN: 0 mL    OUT:    Accordian: 290 mL    Indwelling Catheter - Urethral: 900 mL    Voided: 1275 mL  Total OUT: 2465 mL    Total NET: -2465 mL          CAPILLARY BLOOD GLUCOSE                                          11.7   11.56 )-----------( 152      ( 07 Sep 2018 06:40 )             33.9       09-07    142  |  104  |  18  ----------------------------<  89  4.0   |  31  |  0.92    Ca    9.0      07 Sep 2018 06:40                        MEDICATIONS  (STANDING):  ascorbic acid 500 milliGRAM(s) Oral two times a day  buPROPion XL . 300 milliGRAM(s) Oral daily  calcium carbonate 1250 mG  + Vitamin D (OsCal 500 + D) 1 Tablet(s) Oral three times a day  clonazePAM Tablet 0.5 milliGRAM(s) Oral at bedtime  docusate sodium 100 milliGRAM(s) Oral three times a day  famotidine    Tablet 20 milliGRAM(s) Oral two times a day  folic acid 1 milliGRAM(s) Oral daily  influenza   Vaccine 0.5 milliLiter(s) IntraMuscular once  lactated ringers. 1000 milliLiter(s) (100 mL/Hr) IV Continuous <Continuous>  levothyroxine 25 MICROGram(s) Oral <User Schedule>  levothyroxine 50 MICROGram(s) Oral <User Schedule>  multivitamin 1 Tablet(s) Oral daily  polyethylene glycol 3350 17 Gram(s) Oral daily  senna 2 Tablet(s) Oral at bedtime  sodium chloride 0.9% lock flush 3 milliLiter(s) IV Push every 8 hours    MEDICATIONS  (PRN):  acetaminophen   Tablet .. 650 milliGRAM(s) Oral every 6 hours PRN Temp greater or equal to 38C (100.4F), Mild Pain (1 - 3)  aluminum hydroxide/magnesium hydroxide/simethicone Suspension 30 milliLiter(s) Oral every 12 hours PRN Indigestion  cyclobenzaprine 10 milliGRAM(s) Oral every 8 hours PRN Muscle Spasm  diphenhydrAMINE   Injectable 25 milliGRAM(s) IV Push every 4 hours PRN Pruritus  HYDROmorphone  Injectable 1 milliGRAM(s) IntraMuscular every 6 hours PRN Severe Pain (7 - 10)  magnesium hydroxide Suspension 30 milliLiter(s) Oral every 12 hours PRN Constipation  naloxone Injectable 0.1 milliGRAM(s) IV Push every 3 minutes PRN For ANY of the following changes in patient status:  A. RR LESS THAN 10 breaths per minute, B. Oxygen saturation LESS THAN 90%, C. Sedation score of 6  ondansetron Injectable 4 milliGRAM(s) IV Push every 6 hours PRN Nausea  ondansetron Injectable 4 milliGRAM(s) IV Push every 6 hours PRN Nausea  oxyCODONE    5 mG/acetaminophen 325 mG 2 Tablet(s) Oral every 4 hours PRN Moderate Pain (4 - 6)  prochlorperazine   Injectable 10 milliGRAM(s) IV Push every 6 hours PRN Nausea

## 2018-09-07 NOTE — PROGRESS NOTE ADULT - SUBJECTIVE AND OBJECTIVE BOX
POD#2. Pt seen resting in bed comfortably. Pt has saturated dressing and clothes 2/2 hemovac partially dislodged (non-suctioning). Pt c/o incisional site pain. Pt denies lower extremities pain, numbness, and weakness. Pt is voiding on own without complications. Pain is tolerable with current meds.     PE  Gen appearance: NAD  Motor strength: 5/5 of b/l lower ext  Sensation: intact bilat  No calf tenderness bilat  Incisional site: clean and dry with steristrips. Hemovac drain non-suctioning (120cc last shift)    Plan  Afebrile, BP: 93/46eval per Medicine  WBC:11.56H improved, H/H:11.7/33.9  Hemovac removed. New dressing applied.   Mobilize with physical therapy and encouraged incentive spirometer  Continue DVT prophylaxis  Continue analgesics. POD#2. Pt seen resting in bed comfortably. Pt has saturated dressing and clothes 2/2 hemovac partially dislodged (non-suctioning). Pt c/o incisional site pain. Pt denies lower extremities pain, numbness, and weakness. Pt is voiding on own without complications. Pain is tolerable with current meds. Pt denies chest pain, SOB, vertigo.    PE  Gen appearance: NAD  Motor strength: 5/5 of b/l lower ext  Sensation: intact bilat  No calf tenderness bilat  Incisional site: clean and dry with steristrips. Hemovac drain non-suctioning (120cc last shift)    Plan  Afebrile, BP: 93/46eval per Medicine---500cc NS bolus  WBC:11.56H improved, H/H:11.7/33.9  Hemovac removed. New dressing applied.   Mobilize with physical therapy and encouraged incentive spirometer  Continue DVT prophylaxis  Continue analgesics.

## 2018-09-07 NOTE — PROGRESS NOTE ADULT - ASSESSMENT
Pt is a 69 y/o male with h/o anxiety, BPH, chronic LBP, GERD, hypothyroidism who has been having increasing LBP.  He was being followed by Dr Claire, failed PEREZ and all conservative measures therefore he was admitted for elective L2-L5 laminectomy with lumbar fusion.  Postop medicine consult called for comanagement.     * Lumbar Stenosis-failed conservative measures now s/p L2-L5 laminectomy with fusion.  Doing well overall, continue pain control, encourage use of incentive spirometry, monitor postop labs and drain outpt, DVT proph.  * Hypothyroidism-synthroid  * Acute Blood Loss Anemia-due to surgical loss, monitor cbc and drain outpt  * Leukocytosis-reactive from surgery, monitor as is improving and pt is non-toxic  * Anxiety-bupropion, Klonopin  * GERD- famotidine  * Proph- venodynes while drain in place  * Disp-OOB, PT  * Comm- d/w pt in details, all questions answered, RN

## 2018-09-07 NOTE — PROGRESS NOTE ADULT - SUBJECTIVE AND OBJECTIVE BOX
Patient seen and examined at bedside. No acute events overnight. Pain controlled.    Vital Signs Last 24 Hrs  T(C): 37.2 (07 Sep 2018 05:47), Max: 37.2 (07 Sep 2018 00:07)  T(F): 99 (07 Sep 2018 05:47), Max: 99 (07 Sep 2018 05:47)  HR: 80 (07 Sep 2018 05:47) (80 - 84)  BP: 93/46 (07 Sep 2018 05:47) (93/46 - 108/59)  BP(mean): 69 (06 Sep 2018 11:19) (69 - 69)  RR: 16 (07 Sep 2018 05:47) (16 - 16)  SpO2: 98% (07 Sep 2018 05:47) (98% - 100%)                          11.9   18.48 )-----------( 172      ( 06 Sep 2018 06:57 )             34.9       Physical Exam:  General: Not in acute distress, resting comfortably.     Spine:  Dressing is clean, dry, and intact. Drains intact with serosanguineous output.   Sensation intact to light touch in L2-S1 nerve distributions bilaterally  Dorsalis pedis/Posterior tibial pulses 2+  Compartments soft and compressible    Motor:     Right Lower Extremity      Left Lower Extremity               L2: 5/5                               L2: 5/5               L3: 5/5                               L3: 5/5               L4: 5/5                               L4: 5/5               L5: 5/5                               L5: 5/5               S1: 5/5                              S1: 5/5    Assessment and Plan:    68y Male s/p Posterior Spinal Fusion/laminectomy L2-L5 on 9/5/18.   - Analgesia  - Incentive Spirometry  - Weight Bearing as Tolerated  - Physical Therapy, OOB-Chair  - TLSO back brace while out of bed ambulating  - Mechanical DVT Prophylaxis (Venodynes)  - Hemovac drain monitoring  - Medical management appreciated  - Discharge Planning

## 2018-09-08 DIAGNOSIS — M54.16 RADICULOPATHY, LUMBAR REGION: ICD-10-CM

## 2018-09-08 LAB
ANION GAP SERPL CALC-SCNC: 8 MMOL/L — SIGNIFICANT CHANGE UP (ref 5–17)
ANISOCYTOSIS BLD QL: SLIGHT — SIGNIFICANT CHANGE UP
BASOPHILS # BLD AUTO: 0 K/UL — SIGNIFICANT CHANGE UP (ref 0–0.2)
BASOPHILS NFR BLD AUTO: 0 % — SIGNIFICANT CHANGE UP (ref 0–2)
BUN SERPL-MCNC: 11 MG/DL — SIGNIFICANT CHANGE UP (ref 7–23)
CALCIUM SERPL-MCNC: 9.1 MG/DL — SIGNIFICANT CHANGE UP (ref 8.5–10.1)
CHLORIDE SERPL-SCNC: 102 MMOL/L — SIGNIFICANT CHANGE UP (ref 96–108)
CO2 SERPL-SCNC: 28 MMOL/L — SIGNIFICANT CHANGE UP (ref 22–31)
CREAT SERPL-MCNC: 0.9 MG/DL — SIGNIFICANT CHANGE UP (ref 0.5–1.3)
EOSINOPHIL # BLD AUTO: 0.27 K/UL — SIGNIFICANT CHANGE UP (ref 0–0.5)
EOSINOPHIL NFR BLD AUTO: 2 % — SIGNIFICANT CHANGE UP (ref 0–6)
GLUCOSE SERPL-MCNC: 99 MG/DL — SIGNIFICANT CHANGE UP (ref 70–99)
HCT VFR BLD CALC: 34.3 % — LOW (ref 39–50)
HGB BLD-MCNC: 11.6 G/DL — LOW (ref 13–17)
LYMPHOCYTES # BLD AUTO: 0.68 K/UL — LOW (ref 1–3.3)
LYMPHOCYTES # BLD AUTO: 5 % — LOW (ref 13–44)
MANUAL SMEAR VERIFICATION: SIGNIFICANT CHANGE UP
MCHC RBC-ENTMCNC: 31.2 PG — SIGNIFICANT CHANGE UP (ref 27–34)
MCHC RBC-ENTMCNC: 33.8 GM/DL — SIGNIFICANT CHANGE UP (ref 32–36)
MCV RBC AUTO: 92.2 FL — SIGNIFICANT CHANGE UP (ref 80–100)
MONOCYTES # BLD AUTO: 0.81 K/UL — SIGNIFICANT CHANGE UP (ref 0–0.9)
MONOCYTES NFR BLD AUTO: 6 % — SIGNIFICANT CHANGE UP (ref 2–14)
NEUTROPHILS # BLD AUTO: 11.79 K/UL — HIGH (ref 1.8–7.4)
NEUTROPHILS NFR BLD AUTO: 84 % — HIGH (ref 43–77)
NEUTS BAND # BLD: 3 % — SIGNIFICANT CHANGE UP (ref 0–8)
NRBC # BLD: 0 /100 — SIGNIFICANT CHANGE UP (ref 0–0)
NRBC # BLD: SIGNIFICANT CHANGE UP /100 WBCS (ref 0–0)
OVALOCYTES BLD QL SMEAR: SLIGHT — SIGNIFICANT CHANGE UP
PLAT MORPH BLD: NORMAL — SIGNIFICANT CHANGE UP
PLATELET # BLD AUTO: 147 K/UL — LOW (ref 150–400)
POIKILOCYTOSIS BLD QL AUTO: SLIGHT — SIGNIFICANT CHANGE UP
POTASSIUM SERPL-MCNC: 4.1 MMOL/L — SIGNIFICANT CHANGE UP (ref 3.5–5.3)
POTASSIUM SERPL-SCNC: 4.1 MMOL/L — SIGNIFICANT CHANGE UP (ref 3.5–5.3)
RBC # BLD: 3.72 M/UL — LOW (ref 4.2–5.8)
RBC # FLD: 13.9 % — SIGNIFICANT CHANGE UP (ref 10.3–14.5)
RBC BLD AUTO: ABNORMAL
SODIUM SERPL-SCNC: 138 MMOL/L — SIGNIFICANT CHANGE UP (ref 135–145)
WBC # BLD: 13.55 K/UL — HIGH (ref 3.8–10.5)
WBC # FLD AUTO: 13.55 K/UL — HIGH (ref 3.8–10.5)

## 2018-09-08 RX ORDER — GABAPENTIN 400 MG/1
300 CAPSULE ORAL
Qty: 0 | Refills: 0 | Status: DISCONTINUED | OUTPATIENT
Start: 2018-09-08 | End: 2018-09-08

## 2018-09-08 RX ORDER — SODIUM CHLORIDE 9 MG/ML
250 INJECTION INTRAMUSCULAR; INTRAVENOUS; SUBCUTANEOUS ONCE
Qty: 0 | Refills: 0 | Status: COMPLETED | OUTPATIENT
Start: 2018-09-08 | End: 2018-09-08

## 2018-09-08 RX ORDER — HEPARIN SODIUM 5000 [USP'U]/ML
5000 INJECTION INTRAVENOUS; SUBCUTANEOUS EVERY 8 HOURS
Qty: 0 | Refills: 0 | Status: DISCONTINUED | OUTPATIENT
Start: 2018-09-08 | End: 2018-09-11

## 2018-09-08 RX ADMIN — HEPARIN SODIUM 5000 UNIT(S): 5000 INJECTION INTRAVENOUS; SUBCUTANEOUS at 13:29

## 2018-09-08 RX ADMIN — Medication 0.5 MILLIGRAM(S): at 21:47

## 2018-09-08 RX ADMIN — HYDROMORPHONE HYDROCHLORIDE 2 MILLIGRAM(S): 2 INJECTION INTRAMUSCULAR; INTRAVENOUS; SUBCUTANEOUS at 05:31

## 2018-09-08 RX ADMIN — SODIUM CHLORIDE 3 MILLILITER(S): 9 INJECTION INTRAMUSCULAR; INTRAVENOUS; SUBCUTANEOUS at 11:52

## 2018-09-08 RX ADMIN — Medication 650 MILLIGRAM(S): at 14:36

## 2018-09-08 RX ADMIN — Medication 1 TABLET(S): at 05:33

## 2018-09-08 RX ADMIN — Medication 1 TABLET(S): at 21:47

## 2018-09-08 RX ADMIN — HYDROMORPHONE HYDROCHLORIDE 2 MILLIGRAM(S): 2 INJECTION INTRAMUSCULAR; INTRAVENOUS; SUBCUTANEOUS at 11:11

## 2018-09-08 RX ADMIN — SODIUM CHLORIDE 500 MILLILITER(S): 9 INJECTION INTRAMUSCULAR; INTRAVENOUS; SUBCUTANEOUS at 13:29

## 2018-09-08 RX ADMIN — ONDANSETRON 4 MILLIGRAM(S): 8 TABLET, FILM COATED ORAL at 17:15

## 2018-09-08 RX ADMIN — Medication 1 TABLET(S): at 11:09

## 2018-09-08 RX ADMIN — Medication 500 MILLIGRAM(S): at 16:26

## 2018-09-08 RX ADMIN — CYCLOBENZAPRINE HYDROCHLORIDE 10 MILLIGRAM(S): 10 TABLET, FILM COATED ORAL at 13:35

## 2018-09-08 RX ADMIN — HYDROMORPHONE HYDROCHLORIDE 2 MILLIGRAM(S): 2 INJECTION INTRAMUSCULAR; INTRAVENOUS; SUBCUTANEOUS at 17:03

## 2018-09-08 RX ADMIN — POLYETHYLENE GLYCOL 3350 17 GRAM(S): 17 POWDER, FOR SOLUTION ORAL at 09:59

## 2018-09-08 RX ADMIN — HYDROMORPHONE HYDROCHLORIDE 2 MILLIGRAM(S): 2 INJECTION INTRAMUSCULAR; INTRAVENOUS; SUBCUTANEOUS at 20:16

## 2018-09-08 RX ADMIN — Medication 50 MICROGRAM(S): at 05:35

## 2018-09-08 RX ADMIN — HYDROMORPHONE HYDROCHLORIDE 2 MILLIGRAM(S): 2 INJECTION INTRAMUSCULAR; INTRAVENOUS; SUBCUTANEOUS at 16:26

## 2018-09-08 RX ADMIN — CYCLOBENZAPRINE HYDROCHLORIDE 10 MILLIGRAM(S): 10 TABLET, FILM COATED ORAL at 05:32

## 2018-09-08 RX ADMIN — HYDROMORPHONE HYDROCHLORIDE 2 MILLIGRAM(S): 2 INJECTION INTRAMUSCULAR; INTRAVENOUS; SUBCUTANEOUS at 06:00

## 2018-09-08 RX ADMIN — HEPARIN SODIUM 5000 UNIT(S): 5000 INJECTION INTRAVENOUS; SUBCUTANEOUS at 21:47

## 2018-09-08 RX ADMIN — Medication 1 MILLIGRAM(S): at 11:09

## 2018-09-08 RX ADMIN — FAMOTIDINE 20 MILLIGRAM(S): 10 INJECTION INTRAVENOUS at 05:31

## 2018-09-08 RX ADMIN — Medication 100 MILLIGRAM(S): at 11:10

## 2018-09-08 RX ADMIN — Medication 100 MILLIGRAM(S): at 05:33

## 2018-09-08 RX ADMIN — SODIUM CHLORIDE 3 MILLILITER(S): 9 INJECTION INTRAMUSCULAR; INTRAVENOUS; SUBCUTANEOUS at 05:09

## 2018-09-08 RX ADMIN — Medication 500 MILLIGRAM(S): at 05:33

## 2018-09-08 RX ADMIN — Medication 100 MILLIGRAM(S): at 21:46

## 2018-09-08 RX ADMIN — Medication 650 MILLIGRAM(S): at 13:36

## 2018-09-08 RX ADMIN — CYCLOBENZAPRINE HYDROCHLORIDE 10 MILLIGRAM(S): 10 TABLET, FILM COATED ORAL at 21:46

## 2018-09-08 RX ADMIN — BUPROPION HYDROCHLORIDE 300 MILLIGRAM(S): 150 TABLET, EXTENDED RELEASE ORAL at 11:10

## 2018-09-08 RX ADMIN — HYDROMORPHONE HYDROCHLORIDE 2 MILLIGRAM(S): 2 INJECTION INTRAMUSCULAR; INTRAVENOUS; SUBCUTANEOUS at 11:41

## 2018-09-08 RX ADMIN — HYDROMORPHONE HYDROCHLORIDE 2 MILLIGRAM(S): 2 INJECTION INTRAMUSCULAR; INTRAVENOUS; SUBCUTANEOUS at 00:35

## 2018-09-08 RX ADMIN — HYDROMORPHONE HYDROCHLORIDE 2 MILLIGRAM(S): 2 INJECTION INTRAMUSCULAR; INTRAVENOUS; SUBCUTANEOUS at 01:05

## 2018-09-08 RX ADMIN — SENNA PLUS 2 TABLET(S): 8.6 TABLET ORAL at 21:47

## 2018-09-08 RX ADMIN — Medication 30 MILLILITER(S): at 11:09

## 2018-09-08 RX ADMIN — SODIUM CHLORIDE 3 MILLILITER(S): 9 INJECTION INTRAMUSCULAR; INTRAVENOUS; SUBCUTANEOUS at 21:47

## 2018-09-08 RX ADMIN — FAMOTIDINE 20 MILLIGRAM(S): 10 INJECTION INTRAVENOUS at 16:26

## 2018-09-08 NOTE — PROGRESS NOTE ADULT - SUBJECTIVE AND OBJECTIVE BOX
POD 3  Patient is doing well.  Having some pain into the buttocks which is burning.  LE pain is improved. Some spasms in the low back and incisional pain. Dilaudid may be working better than percocet but he is not certain yet.   He is voiding well.     PE: NAD Afeb.  Moving LE well  Dressing changed.  Wound is dry and no erythema or edema.     WBC is higher than yesterday at 13.55 H/H 11.6/34.3

## 2018-09-08 NOTE — PROGRESS NOTE ADULT - SUBJECTIVE AND OBJECTIVE BOX
9/8/18: tired, not moving much with physical therapy; no cp, sob, n/v/f/c; not much appetite    ROS: AS PER HPI OTHERWISE ALL OTHER SYSTEMS REVIEWED AND ARE NEGATIVE  Vital Signs Last 24 Hrs  T(C): 36.8 (08 Sep 2018 11:34), Max: 37.4 (07 Sep 2018 19:59)  T(F): 98.2 (08 Sep 2018 11:34), Max: 99.4 (07 Sep 2018 19:59)  HR: 92 (08 Sep 2018 11:34) (92 - 95)  BP: 101/64 (08 Sep 2018 11:34) (98/48 - 116/58)  BP(mean): 73 (08 Sep 2018 11:34) (73 - 73)  RR: 18 (08 Sep 2018 11:34) (16 - 18)  SpO2: 100% (08 Sep 2018 11:34) (96% - 100%)    GEN: A and O, NAD,  mood stable  HEENT:  NC/AT, EOMI, no oropharyngeal lesions    NECK:   supple    CV:  +S1, +S2, regular, no murmurs or rubs    RESP:   lungs clear to auscultation bilaterally, no wheezing, SCATTERED CRACKLES RIGHT BASE    GI:  abdomen soft, non-tender, non-distended, normal BS, no abdominal masses, no palpable masses    BACK: DRESSING C/D/I, NO HEMOVAC IN PLACE    RECTAL:  not examined    :  not examined    MSK:   normal muscle tone, no atrophy, no rigidity, no contractions    EXT:   no clubbing, no cyanosis, no edema, no calf pain, swelling or erythema    VASCULAR:  pulses equal and symmetric in the upper and lower extremities    NEURO:  AAOX3, no focal neurological deficits, follows all commands, able to move extremities spontaneously    SKIN:  no ulcers, lesions or rashes                              11.6   13.55 )-----------( 147      ( 08 Sep 2018 05:44 )             34.3     09-08    138  |  102  |  11  ----------------------------<  99  4.1   |  28  |  0.90    Ca    9.1      08 Sep 2018 05:44        MEDICATIONS  (STANDING):  ascorbic acid 500 milliGRAM(s) Oral two times a day  buPROPion XL . 300 milliGRAM(s) Oral daily  calcium carbonate 1250 mG  + Vitamin D (OsCal 500 + D) 1 Tablet(s) Oral three times a day  clonazePAM Tablet 0.5 milliGRAM(s) Oral at bedtime  docusate sodium 100 milliGRAM(s) Oral three times a day  famotidine    Tablet 20 milliGRAM(s) Oral two times a day  folic acid 1 milliGRAM(s) Oral daily  levothyroxine 25 MICROGram(s) Oral <User Schedule>  levothyroxine 50 MICROGram(s) Oral <User Schedule>  multivitamin 1 Tablet(s) Oral daily  polyethylene glycol 3350 17 Gram(s) Oral daily  senna 2 Tablet(s) Oral at bedtime  sodium chloride 0.9% lock flush 3 milliLiter(s) IV Push every 8 hours    MEDICATIONS  (PRN):  acetaminophen   Tablet .. 650 milliGRAM(s) Oral every 6 hours PRN Temp greater or equal to 38C (100.4F), Mild Pain (1 - 3)  aluminum hydroxide/magnesium hydroxide/simethicone Suspension 30 milliLiter(s) Oral every 12 hours PRN Indigestion  cyclobenzaprine 10 milliGRAM(s) Oral every 8 hours PRN Muscle Spasm  diphenhydrAMINE   Injectable 25 milliGRAM(s) IV Push every 4 hours PRN Pruritus  HYDROmorphone   Tablet 2 milliGRAM(s) Oral every 4 hours PRN Mild Pain (1 - 3)  HYDROmorphone   Tablet 4 milliGRAM(s) Oral every 4 hours PRN Severe Pain (7 - 10)  HYDROmorphone  Injectable 1 milliGRAM(s) IntraMuscular every 6 hours PRN Severe Pain (7 - 10)  magnesium hydroxide Suspension 30 milliLiter(s) Oral every 12 hours PRN Constipation  naloxone Injectable 0.1 milliGRAM(s) IV Push every 3 minutes PRN For ANY of the following changes in patient status:  A. RR LESS THAN 10 breaths per minute, B. Oxygen saturation LESS THAN 90%, C. Sedation score of 6  ondansetron Injectable 4 milliGRAM(s) IV Push every 6 hours PRN Nausea  ondansetron Injectable 4 milliGRAM(s) IV Push every 6 hours PRN Nausea  prochlorperazine   Injectable 10 milliGRAM(s) IV Push every 6 hours PRN Nausea

## 2018-09-08 NOTE — PROGRESS NOTE ADULT - SUBJECTIVE AND OBJECTIVE BOX
Patient seen and examined    Complains of no appetite    No fevers    Wound clean and dry    Abd exam benign    LE good strength and sens  No DVT    WBC increased to 13     - Needs to mobilize   - Dilaudid for pain control   - Add gabapentin   - Encouraged PO   - Appreciate Dr Shoemaker input    FELISHA Becker

## 2018-09-08 NOTE — PROGRESS NOTE ADULT - ASSESSMENT
Pt is a 69 y/o male with h/o anxiety, BPH, chronic LBP, GERD, hypothyroidism who has been having increasing LBP.  He was being followed by Dr Claire, failed PEREZ and all conservative measures therefore he was admitted for elective L2-L5 laminectomy with lumbar fusion.  Postop medicine consult called for comanagement.     * Lumbar Stenosis-failed conservative measures now s/p L2-L5 laminectomy with fusion.  Doing well overall, continue pain control, encourage use of incentive spirometry, monitor postop labs and drain outpt, DVT proph. Encouraged IS again due to crackles on exam  * Hypothyroidism-synthroid  *hypotension - not taking in much po, will bolus  * Acute Blood Loss Anemia-due to surgical loss, h/h stable  * Leukocytosis-reactive from surgery, monitor as is improving and pt is non-toxic; slightly elevated today  * Anxiety-bupropion, Klonopin  * GERD- famotidine  * Proph- venodynes, start sq heparin (ok with mermelstein)  * Disp-OOB, PT  * Comm- d/w pt in details, all questions answered, RN

## 2018-09-09 LAB
ANION GAP SERPL CALC-SCNC: 10 MMOL/L — SIGNIFICANT CHANGE UP (ref 5–17)
BUN SERPL-MCNC: 14 MG/DL — SIGNIFICANT CHANGE UP (ref 7–23)
CALCIUM SERPL-MCNC: 9.1 MG/DL — SIGNIFICANT CHANGE UP (ref 8.5–10.1)
CHLORIDE SERPL-SCNC: 97 MMOL/L — SIGNIFICANT CHANGE UP (ref 96–108)
CO2 SERPL-SCNC: 28 MMOL/L — SIGNIFICANT CHANGE UP (ref 22–31)
CREAT SERPL-MCNC: 1.08 MG/DL — SIGNIFICANT CHANGE UP (ref 0.5–1.3)
GLUCOSE SERPL-MCNC: 85 MG/DL — SIGNIFICANT CHANGE UP (ref 70–99)
HCT VFR BLD CALC: 36.2 % — LOW (ref 39–50)
HGB BLD-MCNC: 12.3 G/DL — LOW (ref 13–17)
MCHC RBC-ENTMCNC: 31.5 PG — SIGNIFICANT CHANGE UP (ref 27–34)
MCHC RBC-ENTMCNC: 34 GM/DL — SIGNIFICANT CHANGE UP (ref 32–36)
MCV RBC AUTO: 92.8 FL — SIGNIFICANT CHANGE UP (ref 80–100)
NRBC # BLD: 0 /100 WBCS — SIGNIFICANT CHANGE UP (ref 0–0)
PLATELET # BLD AUTO: 156 K/UL — SIGNIFICANT CHANGE UP (ref 150–400)
POTASSIUM SERPL-MCNC: 4.1 MMOL/L — SIGNIFICANT CHANGE UP (ref 3.5–5.3)
POTASSIUM SERPL-SCNC: 4.1 MMOL/L — SIGNIFICANT CHANGE UP (ref 3.5–5.3)
RBC # BLD: 3.9 M/UL — LOW (ref 4.2–5.8)
RBC # FLD: 13.7 % — SIGNIFICANT CHANGE UP (ref 10.3–14.5)
SODIUM SERPL-SCNC: 135 MMOL/L — SIGNIFICANT CHANGE UP (ref 135–145)
WBC # BLD: 11.8 K/UL — HIGH (ref 3.8–10.5)
WBC # FLD AUTO: 11.8 K/UL — HIGH (ref 3.8–10.5)

## 2018-09-09 RX ADMIN — MAGNESIUM HYDROXIDE 30 MILLILITER(S): 400 TABLET, CHEWABLE ORAL at 13:35

## 2018-09-09 RX ADMIN — FAMOTIDINE 20 MILLIGRAM(S): 10 INJECTION INTRAVENOUS at 17:43

## 2018-09-09 RX ADMIN — Medication 100 MILLIGRAM(S): at 21:17

## 2018-09-09 RX ADMIN — Medication 1 TABLET(S): at 04:48

## 2018-09-09 RX ADMIN — HEPARIN SODIUM 5000 UNIT(S): 5000 INJECTION INTRAVENOUS; SUBCUTANEOUS at 04:47

## 2018-09-09 RX ADMIN — Medication 1 TABLET(S): at 14:13

## 2018-09-09 RX ADMIN — FAMOTIDINE 20 MILLIGRAM(S): 10 INJECTION INTRAVENOUS at 04:48

## 2018-09-09 RX ADMIN — HYDROMORPHONE HYDROCHLORIDE 2 MILLIGRAM(S): 2 INJECTION INTRAMUSCULAR; INTRAVENOUS; SUBCUTANEOUS at 04:47

## 2018-09-09 RX ADMIN — Medication 500 MILLIGRAM(S): at 04:48

## 2018-09-09 RX ADMIN — Medication 1 TABLET(S): at 21:25

## 2018-09-09 RX ADMIN — SODIUM CHLORIDE 3 MILLILITER(S): 9 INJECTION INTRAMUSCULAR; INTRAVENOUS; SUBCUTANEOUS at 04:49

## 2018-09-09 RX ADMIN — HYDROMORPHONE HYDROCHLORIDE 4 MILLIGRAM(S): 2 INJECTION INTRAMUSCULAR; INTRAVENOUS; SUBCUTANEOUS at 20:12

## 2018-09-09 RX ADMIN — HYDROMORPHONE HYDROCHLORIDE 4 MILLIGRAM(S): 2 INJECTION INTRAMUSCULAR; INTRAVENOUS; SUBCUTANEOUS at 09:03

## 2018-09-09 RX ADMIN — SENNA PLUS 2 TABLET(S): 8.6 TABLET ORAL at 21:17

## 2018-09-09 RX ADMIN — Medication 0.5 MILLIGRAM(S): at 21:16

## 2018-09-09 RX ADMIN — HYDROMORPHONE HYDROCHLORIDE 4 MILLIGRAM(S): 2 INJECTION INTRAMUSCULAR; INTRAVENOUS; SUBCUTANEOUS at 14:18

## 2018-09-09 RX ADMIN — Medication 1 TABLET(S): at 11:32

## 2018-09-09 RX ADMIN — SODIUM CHLORIDE 3 MILLILITER(S): 9 INJECTION INTRAMUSCULAR; INTRAVENOUS; SUBCUTANEOUS at 14:15

## 2018-09-09 RX ADMIN — Medication 50 MICROGRAM(S): at 04:47

## 2018-09-09 RX ADMIN — HEPARIN SODIUM 5000 UNIT(S): 5000 INJECTION INTRAVENOUS; SUBCUTANEOUS at 14:13

## 2018-09-09 RX ADMIN — HEPARIN SODIUM 5000 UNIT(S): 5000 INJECTION INTRAVENOUS; SUBCUTANEOUS at 21:17

## 2018-09-09 RX ADMIN — Medication 1 MILLIGRAM(S): at 11:32

## 2018-09-09 RX ADMIN — Medication 100 MILLIGRAM(S): at 14:13

## 2018-09-09 RX ADMIN — POLYETHYLENE GLYCOL 3350 17 GRAM(S): 17 POWDER, FOR SOLUTION ORAL at 11:32

## 2018-09-09 RX ADMIN — SODIUM CHLORIDE 3 MILLILITER(S): 9 INJECTION INTRAMUSCULAR; INTRAVENOUS; SUBCUTANEOUS at 21:18

## 2018-09-09 RX ADMIN — BUPROPION HYDROCHLORIDE 300 MILLIGRAM(S): 150 TABLET, EXTENDED RELEASE ORAL at 11:32

## 2018-09-09 NOTE — PROGRESS NOTE ADULT - SUBJECTIVE AND OBJECTIVE BOX
Patient seen and examined with family at bedside.   He has not been mobilizing much as per the girlfriend.   He needs to be able to handle 14 stairs they have at home.   He did not want to take the gabapentin due to fear of side effects.     PE: NAD Afeb.  Moving LE well with good power though out.   Dressing changed.  Would looks good and is without drainage or redness.     WBC - pending     needs to Mobilize more  Labs reordered today and are pending.   Continue pain medications. Patient seen and examined with family at bedside. Pain is slightly improved today.  He has not been mobilizing much as per the girlfriend.   He needs to be able to handle 14 stairs they have at home.   He did not want to take the gabapentin due to fear of side effects.     PE: NAD Afeb.  Moving LE well with good power though out.   Dressing changed.  Would looks good and is without drainage or redness.     WBC - pending Patient seen and examined with family at bedside. Pain is slightly improved today.  He has not been mobilizing much as per the significant other.   He needs to be able to handle 14 stairs they have at home.   He did not want to take the gabapentin due to fear of side effects.     PE: NAD Afeb.  Moving LE well with good power though out.   Dressing changed.  Would looks good and is without drainage or redness.     WBC - pending

## 2018-09-09 NOTE — PROGRESS NOTE ADULT - SUBJECTIVE AND OBJECTIVE BOX
9/8/18: tired, not moving much with physical therapy; no cp, sob, n/v/f/c; not much appetite  9/9/18: No cp, sob, n/v/f/c; back pain better controlled; ambulated today    ROS: AS PER HPI OTHERWISE ALL OTHER SYSTEMS REVIEWED AND ARE NEGATIVE    Vital Signs Last 24 Hrs  T(C): 37.1 (09 Sep 2018 11:37), Max: 37.4 (08 Sep 2018 23:40)  T(F): 98.8 (09 Sep 2018 11:37), Max: 99.3 (08 Sep 2018 23:40)  HR: 98 (09 Sep 2018 11:37) (88 - 98)  BP: 104/56 (09 Sep 2018 11:37) (104/56 - 110/51)  BP(mean): 66 (09 Sep 2018 11:37) (66 - 66)  RR: 16 (09 Sep 2018 11:37) (16 - 18)  SpO2: 100% (09 Sep 2018 11:37) (96% - 100%)    GEN: A and O, NAD,  mood stable  HEENT:  NC/AT, EOMI, no oropharyngeal lesions    NECK:   supple    CV:  +S1, +S2, regular, no murmurs or rubs    RESP:   lungs clear to auscultation bilaterally, no wheezing, NO CRACKLES TODAY    GI:  abdomen soft, non-tender, non-distended, normal BS, no abdominal masses, no palpable masses    BACK: DRESSING C/D/I, NO HEMOVAC IN PLACE    RECTAL:  not examined    :  not examined    MSK:   normal muscle tone, no atrophy, no rigidity, no contractions    EXT:   no clubbing, no cyanosis, no edema, no calf pain, swelling or erythema    VASCULAR:  pulses equal and symmetric in the upper and lower extremities    NEURO:  AAOX3, no focal neurological deficits, follows all commands, able to move extremities spontaneously    SKIN:  no ulcers, lesions or rashes                                  12.3   11.80 )-----------( 156      ( 09 Sep 2018 11:00 )             36.2     09-09    135  |  97  |  14  ----------------------------<  85  4.1   |  28  |  1.08    Ca    9.1      09 Sep 2018 11:00      MEDICATIONS  (STANDING):  ascorbic acid 500 milliGRAM(s) Oral two times a day  buPROPion XL . 300 milliGRAM(s) Oral daily  calcium carbonate 1250 mG  + Vitamin D (OsCal 500 + D) 1 Tablet(s) Oral three times a day  clonazePAM Tablet 0.5 milliGRAM(s) Oral at bedtime  docusate sodium 100 milliGRAM(s) Oral three times a day  famotidine    Tablet 20 milliGRAM(s) Oral two times a day  folic acid 1 milliGRAM(s) Oral daily  heparin  Injectable 5000 Unit(s) SubCutaneous every 8 hours  levothyroxine 25 MICROGram(s) Oral <User Schedule>  levothyroxine 50 MICROGram(s) Oral <User Schedule>  multivitamin 1 Tablet(s) Oral daily  polyethylene glycol 3350 17 Gram(s) Oral daily  senna 2 Tablet(s) Oral at bedtime  sodium chloride 0.9% lock flush 3 milliLiter(s) IV Push every 8 hours    MEDICATIONS  (PRN):  acetaminophen   Tablet .. 650 milliGRAM(s) Oral every 6 hours PRN Temp greater or equal to 38C (100.4F), Mild Pain (1 - 3)  aluminum hydroxide/magnesium hydroxide/simethicone Suspension 30 milliLiter(s) Oral every 12 hours PRN Indigestion  cyclobenzaprine 10 milliGRAM(s) Oral every 8 hours PRN Muscle Spasm  diphenhydrAMINE   Injectable 25 milliGRAM(s) IV Push every 4 hours PRN Pruritus  HYDROmorphone   Tablet 2 milliGRAM(s) Oral every 4 hours PRN Mild Pain (1 - 3)  HYDROmorphone   Tablet 4 milliGRAM(s) Oral every 4 hours PRN Severe Pain (7 - 10)  HYDROmorphone  Injectable 1 milliGRAM(s) IntraMuscular every 6 hours PRN Severe Pain (7 - 10)  magnesium hydroxide Suspension 30 milliLiter(s) Oral every 12 hours PRN Constipation  naloxone Injectable 0.1 milliGRAM(s) IV Push every 3 minutes PRN For ANY of the following changes in patient status:  A. RR LESS THAN 10 breaths per minute, B. Oxygen saturation LESS THAN 90%, C. Sedation score of 6  ondansetron Injectable 4 milliGRAM(s) IV Push every 6 hours PRN Nausea  ondansetron Injectable 4 milliGRAM(s) IV Push every 6 hours PRN Nausea  prochlorperazine   Injectable 10 milliGRAM(s) IV Push every 6 hours PRN Nausea

## 2018-09-09 NOTE — PROGRESS NOTE ADULT - PROBLEM SELECTOR PLAN 1
Using Incentive Spirometer.   Walking a little - mobilize.   Added Gabapentin 300mg BID for burning in buttocks.
Patient not yet ready for D/C home  Check labs once resulted.   May need another day or 2.

## 2018-09-09 NOTE — PROGRESS NOTE ADULT - ASSESSMENT
Pt is a 69 y/o male with h/o anxiety, BPH, chronic LBP, GERD, hypothyroidism who has been having increasing LBP.  He was being followed by Dr Claire, failed PEREZ and all conservative measures therefore he was admitted for elective L2-L5 laminectomy with lumbar fusion.  Postop medicine consult called for comanagement.     * Lumbar Stenosis-failed conservative measures now s/p L2-L5 laminectomy with fusion.  Doing well overall, continue pain control, encourage use of incentive spirometry, monitor postop labs and drain outpt, DVT proph. Using IS more  * Hypothyroidism-synthroid  *hypotension - Now resolved; doing better in terms of bp  * Acute Blood Loss Anemia-due to surgical loss, h/h stable  * Leukocytosis-reactive from surgery, monitor as is improving and pt is non-toxic; decreasing  * Anxiety-bupropion, Klonopin  * GERD- famotidine  * Proph- venodynes, start sq heparin (ok with mermelstein)  * Disp-OOB, PT  * Comm- d/w pt in details, all questions answered, RN

## 2018-09-10 LAB
ANION GAP SERPL CALC-SCNC: 8 MMOL/L — SIGNIFICANT CHANGE UP (ref 5–17)
BUN SERPL-MCNC: 13 MG/DL — SIGNIFICANT CHANGE UP (ref 7–23)
CALCIUM SERPL-MCNC: 9.1 MG/DL — SIGNIFICANT CHANGE UP (ref 8.5–10.1)
CHLORIDE SERPL-SCNC: 99 MMOL/L — SIGNIFICANT CHANGE UP (ref 96–108)
CO2 SERPL-SCNC: 28 MMOL/L — SIGNIFICANT CHANGE UP (ref 22–31)
CREAT SERPL-MCNC: 0.83 MG/DL — SIGNIFICANT CHANGE UP (ref 0.5–1.3)
GLUCOSE SERPL-MCNC: 97 MG/DL — SIGNIFICANT CHANGE UP (ref 70–99)
HCT VFR BLD CALC: 32.7 % — LOW (ref 39–50)
HGB BLD-MCNC: 11.1 G/DL — LOW (ref 13–17)
MCHC RBC-ENTMCNC: 31.3 PG — SIGNIFICANT CHANGE UP (ref 27–34)
MCHC RBC-ENTMCNC: 33.9 GM/DL — SIGNIFICANT CHANGE UP (ref 32–36)
MCV RBC AUTO: 92.1 FL — SIGNIFICANT CHANGE UP (ref 80–100)
NRBC # BLD: 0 /100 WBCS — SIGNIFICANT CHANGE UP (ref 0–0)
PLATELET # BLD AUTO: 188 K/UL — SIGNIFICANT CHANGE UP (ref 150–400)
POTASSIUM SERPL-MCNC: 4.7 MMOL/L — SIGNIFICANT CHANGE UP (ref 3.5–5.3)
POTASSIUM SERPL-SCNC: 4.7 MMOL/L — SIGNIFICANT CHANGE UP (ref 3.5–5.3)
RBC # BLD: 3.55 M/UL — LOW (ref 4.2–5.8)
RBC # FLD: 13.6 % — SIGNIFICANT CHANGE UP (ref 10.3–14.5)
SODIUM SERPL-SCNC: 135 MMOL/L — SIGNIFICANT CHANGE UP (ref 135–145)
SURGICAL PATHOLOGY FINAL REPORT - CH: SIGNIFICANT CHANGE UP
WBC # BLD: 9.6 K/UL — SIGNIFICANT CHANGE UP (ref 3.8–10.5)
WBC # FLD AUTO: 9.6 K/UL — SIGNIFICANT CHANGE UP (ref 3.8–10.5)

## 2018-09-10 PROCEDURE — 74019 RADEX ABDOMEN 2 VIEWS: CPT | Mod: 26

## 2018-09-10 RX ORDER — PANTOPRAZOLE SODIUM 20 MG/1
40 TABLET, DELAYED RELEASE ORAL
Qty: 0 | Refills: 0 | Status: DISCONTINUED | OUTPATIENT
Start: 2018-09-10 | End: 2018-09-11

## 2018-09-10 RX ORDER — TRAMADOL HYDROCHLORIDE 50 MG/1
50 TABLET ORAL EVERY 4 HOURS
Qty: 0 | Refills: 0 | Status: DISCONTINUED | OUTPATIENT
Start: 2018-09-10 | End: 2018-09-11

## 2018-09-10 RX ADMIN — Medication 1 TABLET(S): at 06:12

## 2018-09-10 RX ADMIN — SODIUM CHLORIDE 3 MILLILITER(S): 9 INJECTION INTRAMUSCULAR; INTRAVENOUS; SUBCUTANEOUS at 21:41

## 2018-09-10 RX ADMIN — TRAMADOL HYDROCHLORIDE 50 MILLIGRAM(S): 50 TABLET ORAL at 20:26

## 2018-09-10 RX ADMIN — HEPARIN SODIUM 5000 UNIT(S): 5000 INJECTION INTRAVENOUS; SUBCUTANEOUS at 15:43

## 2018-09-10 RX ADMIN — HEPARIN SODIUM 5000 UNIT(S): 5000 INJECTION INTRAVENOUS; SUBCUTANEOUS at 21:41

## 2018-09-10 RX ADMIN — TRAMADOL HYDROCHLORIDE 50 MILLIGRAM(S): 50 TABLET ORAL at 17:55

## 2018-09-10 RX ADMIN — SODIUM CHLORIDE 3 MILLILITER(S): 9 INJECTION INTRAMUSCULAR; INTRAVENOUS; SUBCUTANEOUS at 07:05

## 2018-09-10 RX ADMIN — HYDROMORPHONE HYDROCHLORIDE 2 MILLIGRAM(S): 2 INJECTION INTRAMUSCULAR; INTRAVENOUS; SUBCUTANEOUS at 01:00

## 2018-09-10 RX ADMIN — ONDANSETRON 4 MILLIGRAM(S): 8 TABLET, FILM COATED ORAL at 10:50

## 2018-09-10 RX ADMIN — SENNA PLUS 2 TABLET(S): 8.6 TABLET ORAL at 21:41

## 2018-09-10 RX ADMIN — MAGNESIUM HYDROXIDE 30 MILLILITER(S): 400 TABLET, CHEWABLE ORAL at 17:19

## 2018-09-10 RX ADMIN — HYDROMORPHONE HYDROCHLORIDE 2 MILLIGRAM(S): 2 INJECTION INTRAMUSCULAR; INTRAVENOUS; SUBCUTANEOUS at 02:00

## 2018-09-10 RX ADMIN — PANTOPRAZOLE SODIUM 40 MILLIGRAM(S): 20 TABLET, DELAYED RELEASE ORAL at 10:20

## 2018-09-10 RX ADMIN — Medication 650 MILLIGRAM(S): at 17:20

## 2018-09-10 RX ADMIN — Medication 100 MILLIGRAM(S): at 06:12

## 2018-09-10 RX ADMIN — SODIUM CHLORIDE 3 MILLILITER(S): 9 INJECTION INTRAMUSCULAR; INTRAVENOUS; SUBCUTANEOUS at 15:20

## 2018-09-10 RX ADMIN — ONDANSETRON 4 MILLIGRAM(S): 8 TABLET, FILM COATED ORAL at 17:19

## 2018-09-10 RX ADMIN — TRAMADOL HYDROCHLORIDE 50 MILLIGRAM(S): 50 TABLET ORAL at 10:20

## 2018-09-10 RX ADMIN — FAMOTIDINE 20 MILLIGRAM(S): 10 INJECTION INTRAVENOUS at 06:12

## 2018-09-10 RX ADMIN — BUPROPION HYDROCHLORIDE 300 MILLIGRAM(S): 150 TABLET, EXTENDED RELEASE ORAL at 11:54

## 2018-09-10 RX ADMIN — Medication 25 MICROGRAM(S): at 10:20

## 2018-09-10 RX ADMIN — Medication 1 TABLET(S): at 21:41

## 2018-09-10 RX ADMIN — POLYETHYLENE GLYCOL 3350 17 GRAM(S): 17 POWDER, FOR SOLUTION ORAL at 11:54

## 2018-09-10 RX ADMIN — Medication 500 MILLIGRAM(S): at 06:12

## 2018-09-10 RX ADMIN — HEPARIN SODIUM 5000 UNIT(S): 5000 INJECTION INTRAVENOUS; SUBCUTANEOUS at 06:12

## 2018-09-10 RX ADMIN — Medication 0.5 MILLIGRAM(S): at 21:41

## 2018-09-10 RX ADMIN — TRAMADOL HYDROCHLORIDE 50 MILLIGRAM(S): 50 TABLET ORAL at 15:51

## 2018-09-10 RX ADMIN — Medication 100 MILLIGRAM(S): at 21:41

## 2018-09-10 NOTE — PROGRESS NOTE ADULT - SUBJECTIVE AND OBJECTIVE BOX
Patient seen and examined at bedside. No acute events overnight. Pain controlled.    Vital Signs Last 24 Hrs  T(C): 36.8 (09 Sep 2018 20:21), Max: 37.6 (09 Sep 2018 16:53)  T(F): 98.3 (09 Sep 2018 20:21), Max: 99.7 (09 Sep 2018 16:53)  HR: 96 (09 Sep 2018 20:21) (94 - 98)  BP: 114/65 (09 Sep 2018 20:21) (104/56 - 114/65)  BP(mean): 66 (09 Sep 2018 11:37) (66 - 66)  RR: 17 (09 Sep 2018 20:21) (16 - 17)  SpO2: 96% (09 Sep 2018 20:21) (93% - 100%)    Physical Exam:  General: Not in acute distress, resting comfortably.     Spine:  Dressing is clean, dry, and intact. Drain was removed.    Sensation intact to light touch in L2-S1 nerve distributions bilaterally  Dorsalis pedis/Posterior tibial pulses 2+  Compartments soft and compressible    Motor:     Right Lower Extremity      Left Lower Extremity               L2: 5/5                               L2: 5/5               L3: 5/5                               L3: 5/5               L4: 5/5                               L4: 5/5               L5: 5/5                               L5: 5/5               S1: 5/5                              S1: 5/5    Assessment and Plan:    68y Male s/p Posterior Spinal Fusion/laminectomy L2-L5 on 9/5/18.   - Analgesia  - Incentive Spirometry  - Weight Bearing as Tolerated  - Physical Therapy, OOB-Chair  - TLSO back brace while out of bed ambulating  - Mechanical DVT Prophylaxis (Venodynes)  - Medical management appreciated  - Discharge Planning

## 2018-09-10 NOTE — PROGRESS NOTE ADULT - SUBJECTIVE AND OBJECTIVE BOX
POD#5.  Pt seen resting in bed comfortably. Pt denies lower extremities pain, numbness, and weakness. Pt voids on own without complications. Pt has incisional site pain and slow to mobilize with physical therapy. He has discomfort of the left side of the ethan umbilical region after eating. He cannot complete stair sessions with physical therapy.     PE  Gen appearance: NAD  motor strength: 5/5 of b/l iliopsoas, hamstrings, ant tib, EHL, gastrocs  Sensation: intact  No calf tenderness bilat. Venodynes are on  Incisional site: clean and dry with steristrips.     Plan  Tmax: 99.7, otherwise VSS  WBC:9.6, H/H:11.1/32.7  Mobilize with physical therapy   Continue brace.   Discharge pending medical clearance.   Discussed with Dr. Claire.

## 2018-09-10 NOTE — PROGRESS NOTE ADULT - ASSESSMENT
Pt is a 69 y/o male with h/o anxiety, BPH, chronic LBP, GERD, hypothyroidism who has been having increasing LBP.  He was being followed by Dr Claire, failed PEREZ and all conservative measures therefore he was admitted for elective L2-L5 laminectomy with lumbar fusion.  Postop medicine consult called for comanagement.     * Lumbar Stenosis-failed conservative measures now s/p L2-L5 laminectomy with fusion.  Doing well overall, change dilaudid to tramadol due to confusion for pain control, encourage use of incentive spirometry.  * Hypothyroidism-synthroid  * Acute Blood Loss Anemia-due to surgical loss, monitor.  * Leukocytosis-reactive from surgery,  resolved  * Anxiety-bupropion, Klonopin  * GERD- famotidine  * Proph- heparin   * Disp-OOB, PT, d/c planning  * Comm- d/w pt in details, all questions answered, RN

## 2018-09-10 NOTE — PROGRESS NOTE ADULT - SUBJECTIVE AND OBJECTIVE BOX
Pt is c/o LBP, he is noted to be confused on Dilaudid per RN and pt.  No weakness, CP, fevers or chills.    Date of Service: 09-10-18 @ 08:35    Vital Signs Last 24 Hrs  T(C): 36.8 (09 Sep 2018 20:21), Max: 37.6 (09 Sep 2018 16:53)  T(F): 98.3 (09 Sep 2018 20:21), Max: 99.7 (09 Sep 2018 16:53)  HR: 96 (09 Sep 2018 20:21) (94 - 98)  BP: 114/65 (09 Sep 2018 20:21) (104/56 - 114/65)  BP(mean): 66 (09 Sep 2018 11:37) (66 - 66)  RR: 17 (09 Sep 2018 20:21) (16 - 17)  SpO2: 96% (09 Sep 2018 20:21) (93% - 100%)    Daily     Daily     I&O's Detail    09 Sep 2018 07:01  -  10 Sep 2018 07:00  --------------------------------------------------------  IN:  Total IN: 0 mL    OUT:    Voided: 950 mL  Total OUT: 950 mL    Total NET: -950 mL          CAPILLARY BLOOD GLUCOSE                                          11.1   9.60  )-----------( 188      ( 10 Sep 2018 06:20 )             32.7       09-10    135  |  99  |  13  ----------------------------<  97  4.7   |  28  |  0.83    Ca    9.1      10 Sep 2018 06:20                        MEDICATIONS  (STANDING):  ascorbic acid 500 milliGRAM(s) Oral two times a day  buPROPion XL . 300 milliGRAM(s) Oral daily  calcium carbonate 1250 mG  + Vitamin D (OsCal 500 + D) 1 Tablet(s) Oral three times a day  clonazePAM Tablet 0.5 milliGRAM(s) Oral at bedtime  docusate sodium 100 milliGRAM(s) Oral three times a day  folic acid 1 milliGRAM(s) Oral daily  heparin  Injectable 5000 Unit(s) SubCutaneous every 8 hours  levothyroxine 25 MICROGram(s) Oral <User Schedule>  levothyroxine 50 MICROGram(s) Oral <User Schedule>  multivitamin 1 Tablet(s) Oral daily  pantoprazole    Tablet 40 milliGRAM(s) Oral before breakfast  polyethylene glycol 3350 17 Gram(s) Oral daily  senna 2 Tablet(s) Oral at bedtime  sodium chloride 0.9% lock flush 3 milliLiter(s) IV Push every 8 hours    MEDICATIONS  (PRN):  acetaminophen   Tablet .. 650 milliGRAM(s) Oral every 6 hours PRN Temp greater or equal to 38C (100.4F), Mild Pain (1 - 3)  aluminum hydroxide/magnesium hydroxide/simethicone Suspension 30 milliLiter(s) Oral every 12 hours PRN Indigestion  cyclobenzaprine 10 milliGRAM(s) Oral every 8 hours PRN Muscle Spasm  diphenhydrAMINE   Injectable 25 milliGRAM(s) IV Push every 4 hours PRN Pruritus  magnesium hydroxide Suspension 30 milliLiter(s) Oral every 12 hours PRN Constipation  naloxone Injectable 0.1 milliGRAM(s) IV Push every 3 minutes PRN For ANY of the following changes in patient status:  A. RR LESS THAN 10 breaths per minute, B. Oxygen saturation LESS THAN 90%, C. Sedation score of 6  ondansetron Injectable 4 milliGRAM(s) IV Push every 6 hours PRN Nausea  ondansetron Injectable 4 milliGRAM(s) IV Push every 6 hours PRN Nausea  prochlorperazine   Injectable 10 milliGRAM(s) IV Push every 6 hours PRN Nausea  traMADol 50 milliGRAM(s) Oral every 4 hours PRN Moderate Pain (4 - 6)

## 2018-09-11 ENCOUNTER — TRANSCRIPTION ENCOUNTER (OUTPATIENT)
Age: 68
End: 2018-09-11

## 2018-09-11 VITALS — WEIGHT: 180.78 LBS

## 2018-09-11 LAB
HCT VFR BLD CALC: 31.5 % — LOW (ref 39–50)
HGB BLD-MCNC: 10.8 G/DL — LOW (ref 13–17)
MCHC RBC-ENTMCNC: 31.4 PG — SIGNIFICANT CHANGE UP (ref 27–34)
MCHC RBC-ENTMCNC: 34.3 GM/DL — SIGNIFICANT CHANGE UP (ref 32–36)
MCV RBC AUTO: 91.6 FL — SIGNIFICANT CHANGE UP (ref 80–100)
NRBC # BLD: 0 /100 WBCS — SIGNIFICANT CHANGE UP (ref 0–0)
PLATELET # BLD AUTO: 211 K/UL — SIGNIFICANT CHANGE UP (ref 150–400)
RBC # BLD: 3.44 M/UL — LOW (ref 4.2–5.8)
RBC # FLD: 13.5 % — SIGNIFICANT CHANGE UP (ref 10.3–14.5)
WBC # BLD: 9.08 K/UL — SIGNIFICANT CHANGE UP (ref 3.8–10.5)
WBC # FLD AUTO: 9.08 K/UL — SIGNIFICANT CHANGE UP (ref 3.8–10.5)

## 2018-09-11 RX ORDER — CYCLOBENZAPRINE HYDROCHLORIDE 10 MG/1
1 TABLET, FILM COATED ORAL
Qty: 0 | Refills: 0 | DISCHARGE
Start: 2018-09-11

## 2018-09-11 RX ORDER — PROCHLORPERAZINE MALEATE 5 MG
10 TABLET ORAL
Qty: 0 | Refills: 0 | DISCHARGE
Start: 2018-09-11

## 2018-09-11 RX ORDER — MULTIVIT WITH MIN/MFOLATE/K2 340-15/3 G
300 POWDER (GRAM) ORAL DAILY
Qty: 0 | Refills: 0 | Status: DISCONTINUED | OUTPATIENT
Start: 2018-09-11 | End: 2018-09-11

## 2018-09-11 RX ORDER — INFLUENZA VIRUS VACCINE 15; 15; 15; 15 UG/.5ML; UG/.5ML; UG/.5ML; UG/.5ML
0.5 SUSPENSION INTRAMUSCULAR ONCE
Qty: 0 | Refills: 0 | Status: COMPLETED | OUTPATIENT
Start: 2018-09-11 | End: 2018-09-11

## 2018-09-11 RX ORDER — TRAMADOL HYDROCHLORIDE 50 MG/1
1 TABLET ORAL
Qty: 0 | Refills: 0 | DISCHARGE
Start: 2018-09-11

## 2018-09-11 RX ADMIN — Medication 300 MILLILITER(S): at 08:52

## 2018-09-11 RX ADMIN — SODIUM CHLORIDE 3 MILLILITER(S): 9 INJECTION INTRAMUSCULAR; INTRAVENOUS; SUBCUTANEOUS at 05:00

## 2018-09-11 RX ADMIN — Medication 650 MILLIGRAM(S): at 08:22

## 2018-09-11 RX ADMIN — HEPARIN SODIUM 5000 UNIT(S): 5000 INJECTION INTRAVENOUS; SUBCUTANEOUS at 05:04

## 2018-09-11 RX ADMIN — BUPROPION HYDROCHLORIDE 300 MILLIGRAM(S): 150 TABLET, EXTENDED RELEASE ORAL at 11:22

## 2018-09-11 RX ADMIN — Medication 100 MILLIGRAM(S): at 05:05

## 2018-09-11 RX ADMIN — Medication 10 MILLIGRAM(S): at 08:52

## 2018-09-11 RX ADMIN — SODIUM CHLORIDE 3 MILLILITER(S): 9 INJECTION INTRAMUSCULAR; INTRAVENOUS; SUBCUTANEOUS at 11:46

## 2018-09-11 RX ADMIN — Medication 50 MICROGRAM(S): at 08:17

## 2018-09-11 RX ADMIN — TRAMADOL HYDROCHLORIDE 50 MILLIGRAM(S): 50 TABLET ORAL at 04:36

## 2018-09-11 RX ADMIN — INFLUENZA VIRUS VACCINE 0.5 MILLILITER(S): 15; 15; 15; 15 SUSPENSION INTRAMUSCULAR at 12:57

## 2018-09-11 RX ADMIN — TRAMADOL HYDROCHLORIDE 50 MILLIGRAM(S): 50 TABLET ORAL at 04:06

## 2018-09-11 RX ADMIN — PANTOPRAZOLE SODIUM 40 MILLIGRAM(S): 20 TABLET, DELAYED RELEASE ORAL at 05:05

## 2018-09-11 RX ADMIN — Medication 1 MILLIGRAM(S): at 11:22

## 2018-09-11 RX ADMIN — Medication 1 TABLET(S): at 05:05

## 2018-09-11 RX ADMIN — MAGNESIUM HYDROXIDE 30 MILLILITER(S): 400 TABLET, CHEWABLE ORAL at 08:17

## 2018-09-11 NOTE — DISCHARGE NOTE ADULT - CARE PLAN
Principal Discharge DX:	Lumbar disc herniation  Goal:	increase strength and endurance, decrease pain  Assessment and plan of treatment:	s/p lumbar surgery, analgesia, physical therapy

## 2018-09-11 NOTE — DIETITIAN INITIAL EVALUATION ADULT. - OTHER INFO
pt seen as LOS: 69yo male with PMH of anxiety, BPH, constipation, depression, GERD, hiatal hernia, hypothyroidism, lumbar disc herniation and radiculopathy, mitral stenosis, renal cyst. admitted for lumbar fusion with pedicle screw, BMP on 9/5.  Upon visit, pt appears thin with NFPE significant for mild muscle wasting at temporal and clavicle.  Pt endorses poor appetite/PO intake since admission 2/2 constipation.  Eating or drinking liquids hurts, causes nausea 2/2 feeling full due to constipation x 6 days; meeting <50% of estimated nutr needs.  pt with no edema or pressure ulcers.  Based on above, pt meets criteria for moderate malnutrition in acute illness (constipation 2/2 narcotics).  Pt started on mag citrate for constipation and possible enema if mag citrate does not help.  Encouraged protein intake first to aid with healing, pt verbalized understanding.  RECOMMENDATIONS: 1) c/w regular diet with ensure enlive TID and encouarge protein intake first 2) monitor BM's; adjust bowel regimen prn 3) obtain new wt when feasible 4) monitor hydration status as pt with minimal fluid intake

## 2018-09-11 NOTE — DISCHARGE NOTE ADULT - MEDICATION SUMMARY - MEDICATIONS TO STOP TAKING
I will STOP taking the medications listed below when I get home from the hospital:    Norco 5 mg-325 mg oral tablet  -- 1 tab(s) by mouth every 6 hours, As Needed    Motrin  mg oral tablet  -- 1 tab(s) by mouth every 6 hours, As Needed

## 2018-09-11 NOTE — PROGRESS NOTE ADULT - PROVIDER SPECIALTY LIST ADULT
Internal Medicine
Orthopedics
Internal Medicine

## 2018-09-11 NOTE — DIETITIAN INITIAL EVALUATION ADULT. - PERTINENT MEDS FT
heparin, magnesium citrate, protonix, maalox, ascorbic acid, OsCal, colace, folic acid, magnesium hydroxide, MVI, zofran, miralax, senna

## 2018-09-11 NOTE — DISCHARGE NOTE ADULT - PATIENT PORTAL LINK FT
You can access the [facility], offered by [facility], by registering with the following website: [email or web address]

## 2018-09-11 NOTE — PROGRESS NOTE ADULT - REASON FOR ADMISSION
the pain is better
Lumbar stenosis
S/P lumbar Fusion
spine surgery
spine surgery
Lumbar stenosis.
I am constipated
I still have pain

## 2018-09-11 NOTE — PROGRESS NOTE ADULT - SUBJECTIVE AND OBJECTIVE BOX
Patient seen and examined at bedside. No acute events overnight. Pain controlled. Pt c/o abdominal pain. No vomitting. Changed to protonix.     Vital Signs Last 24 Hrs  T(C): 37.2 (10 Sep 2018 23:20), Max: 37.2 (10 Sep 2018 23:20)  T(F): 99 (10 Sep 2018 23:20), Max: 99 (10 Sep 2018 23:20)  HR: 88 (10 Sep 2018 23:20) (85 - 91)  BP: 97/58 (10 Sep 2018 23:20) (97/58 - 110/67)  BP(mean): --  RR: 16 (10 Sep 2018 23:20) (16 - 18)  SpO2: 95% (10 Sep 2018 23:20) (95% - 99%)    Physical Exam:  General: Not in acute distress, resting comfortably.     Spine:  Dressing is clean, dry, and intact. Drain was removed.    Sensation intact to light touch in L2-S1 nerve distributions bilaterally  Dorsalis pedis/Posterior tibial pulses 2+  Compartments soft and compressible    Motor:     Right Lower Extremity      Left Lower Extremity               L2: 5/5                               L2: 5/5               L3: 5/5                               L3: 5/5               L4: 5/5                               L4: 5/5               L5: 5/5                               L5: 5/5               S1: 5/5                              S1: 5/5    Assessment and Plan:    68y Male s/p Posterior Spinal Fusion/laminectomy L2-L5 on 9/5/18.   - Analgesia  - Incentive Spirometry  - Weight Bearing as Tolerated  - Physical Therapy, OOB-Chair  - TLSO back brace while out of bed ambulating  - Mechanical DVT Prophylaxis (Venodynes)  - Discharge pending medical clearance

## 2018-09-11 NOTE — PROGRESS NOTE ADULT - ASSESSMENT
A/P: s/p L2-5 laminectomy/fusion - stable  1. needs to do stairs w/PT  2. will consider discharge home w/home health care/PT after +BM and ability to do stairs w/PT, possibly later today

## 2018-09-11 NOTE — CHART NOTE - NSCHARTNOTEFT_GEN_A_CORE
Upon Nutritional Assessment by the Registered Dietitian your patient was determined to meet criteria / has evidence of the following diagnosis/diagnoses:          [ ]  Mild Protein Calorie Malnutrition        [x]  Moderate Protein Calorie Malnutrition        [ ] Severe Protein Calorie Malnutrition        [ ] Unspecified Protein Calorie Malnutrition        [ ] Underweight / BMI <19        [ ] Morbid Obesity / BMI > 40      Findings:  Upon visit, pt appears thin with NFPE significant for mild muscle wasting at temporal and clavicle.  Pt endorses poor appetite/PO intake since admission 2/2 constipation.  Eating or drinking liquids hurts, causes nausea 2/2 feeling full due to constipation x 6 days; meeting <50% of estimated nutr needs.  pt with no edema or pressure ulcers.  Based on above, pt meets criteria for moderate malnutrition in acute illness (constipation 2/2 narcotics).    Findings as based on:  •  Comprehensive nutrition assessment and consultation  •  Calorie counts (nutrient intake analysis)  •  Food acceptance and intake status from observations by staff  •  Follow up  •  Patient education  •  Intervention secondary to interdisciplinary rounds  •   concerns      Treatment:    The following diet has been recommended:  1) c/w regular diet with ensure enlive TID and encouarge protein intake first   2) monitor BM's; adjust bowel regimen prn   3) obtain new wt when feasible   4) monitor hydration status as pt with minimal fluid intake  5) add MVI with minerals daily to ensure 100% RDI met    PROVIDER Section:     By signing this assessment you are acknowledging and agree with the diagnosis/diagnoses assigned by the Registered Dietitian    Comments:

## 2018-09-11 NOTE — DISCHARGE NOTE ADULT - HOSPITAL COURSE
The patient was admitted on 09-05-18 for elective L2-5 laminectomy, fusion done on the day of admission. He tolerated the surgery without complication and was transferred to  from the PACU. POD #1 - drain w/180cc output was kept, PCA and liz were discontinued, VSS, afebrile, PT was begun.  POD #2 - voiding on own, slow to mobilize, drain found dislodged/out - removed, wound intact - dressing changed, not tolerating Percocet - medication changed to Dilaudid, labs stable, encouraged to mobilize w/PT. POD #3 - still slow to mobilize, c/o burning pain in buttocks - Neurontin but pt declined to take, wound intact - dressing changed, WBC increased slightly. POD #4 - WBC improved to 11.8, VSS, afebrile, motor 5/5 b/l LEs, slow to mobilize, significant other stating periods of confusion - medication changed to tramadol, RN stating pt alert POD #5 - c/o LLQ abd discomfort after eating, abdominal xray ordered - s/w constipation, bowel regimen adjusted by Dr. Lee, mobilizing better but unable to do stairs, neuro/motor intact. POD #6 - +BM, able to do stairs w/PT, ambulating w/walker better, VSS, afebrile, WBC 9.08 (wnl), neuro/motor intact, wound clean, dry, intact w/steristrips in place, arrangements made for hospital bed, home PT. Arrangements pending for health aide. Patient is subsequently discharged home in stable condition.

## 2018-09-11 NOTE — PROGRESS NOTE ADULT - SUBJECTIVE AND OBJECTIVE BOX
Pt is c/o left sided lower abd pain from constipation for the past 24 hours.  His abd x-ray yesterday c/w constipation with no acute finding.  C/o occasional nausea w/o vomiting.    Date of Service: 09-11-18 @ 08:54    Vital Signs Last 24 Hrs  T(C): 37.2 (10 Sep 2018 23:20), Max: 37.2 (10 Sep 2018 23:20)  T(F): 99 (10 Sep 2018 23:20), Max: 99 (10 Sep 2018 23:20)  HR: 88 (10 Sep 2018 23:20) (85 - 91)  BP: 97/58 (10 Sep 2018 23:20) (97/58 - 110/67)  BP(mean): --  RR: 16 (10 Sep 2018 23:20) (16 - 18)  SpO2: 95% (10 Sep 2018 23:20) (95% - 99%)    Daily     Daily     I&O's Detail    10 Sep 2018 07:01  -  11 Sep 2018 07:00  --------------------------------------------------------  IN:  Total IN: 0 mL    OUT:    Voided: 1250 mL  Total OUT: 1250 mL    Total NET: -1250 mL          CAPILLARY BLOOD GLUCOSE                                          10.8   9.08  )-----------( 211      ( 11 Sep 2018 05:38 )             31.5       09-10    135  |  99  |  13  ----------------------------<  97  4.7   |  28  |  0.83    Ca    9.1      10 Sep 2018 06:20                        MEDICATIONS  (STANDING):  ascorbic acid 500 milliGRAM(s) Oral two times a day  buPROPion XL . 300 milliGRAM(s) Oral daily  calcium carbonate 1250 mG  + Vitamin D (OsCal 500 + D) 1 Tablet(s) Oral three times a day  clonazePAM Tablet 0.5 milliGRAM(s) Oral at bedtime  docusate sodium 100 milliGRAM(s) Oral three times a day  folic acid 1 milliGRAM(s) Oral daily  heparin  Injectable 5000 Unit(s) SubCutaneous every 8 hours  multivitamin 1 Tablet(s) Oral daily  pantoprazole    Tablet 40 milliGRAM(s) Oral before breakfast  polyethylene glycol 3350 17 Gram(s) Oral daily  senna 2 Tablet(s) Oral at bedtime  sodium chloride 0.9% lock flush 3 milliLiter(s) IV Push every 8 hours    MEDICATIONS  (PRN):  acetaminophen   Tablet .. 650 milliGRAM(s) Oral every 6 hours PRN Temp greater or equal to 38C (100.4F), Mild Pain (1 - 3)  aluminum hydroxide/magnesium hydroxide/simethicone Suspension 30 milliLiter(s) Oral every 12 hours PRN Indigestion  cyclobenzaprine 10 milliGRAM(s) Oral every 8 hours PRN Muscle Spasm  diphenhydrAMINE   Injectable 25 milliGRAM(s) IV Push every 4 hours PRN Pruritus  magnesium citrate Solution 300 milliLiter(s) Oral daily PRN Constipation  magnesium hydroxide Suspension 30 milliLiter(s) Oral every 12 hours PRN Constipation  naloxone Injectable 0.1 milliGRAM(s) IV Push every 3 minutes PRN For ANY of the following changes in patient status:  A. RR LESS THAN 10 breaths per minute, B. Oxygen saturation LESS THAN 90%, C. Sedation score of 6  ondansetron Injectable 4 milliGRAM(s) IV Push every 6 hours PRN Nausea  ondansetron Injectable 4 milliGRAM(s) IV Push every 6 hours PRN Nausea  prochlorperazine   Injectable 10 milliGRAM(s) IV Push every 6 hours PRN Nausea  traMADol 50 milliGRAM(s) Oral every 4 hours PRN Moderate Pain (4 - 6)

## 2018-09-11 NOTE — PROGRESS NOTE ADULT - RS GEN PE MLT RESP DETAILS PC
breath sounds equal/diminished breath sounds, L/diminished breath sounds, R/respirations non-labored
clear to auscultation bilaterally/breath sounds equal/respirations non-labored
diminished breath sounds, L/diminished breath sounds, R/breath sounds equal/respirations non-labored

## 2018-09-11 NOTE — DISCHARGE NOTE ADULT - MEDICATION SUMMARY - MEDICATIONS TO TAKE
I will START or STAY ON the medications listed below when I get home from the hospital:    Tylenol 500 mg oral tablet  -- 1 tab(s) by mouth 3 times a day  -- Indication: For pain, fever    traMADol 50 mg oral tablet  -- 1 tab(s) by mouth every 4 hours, As needed, Moderate Pain (4 - 6)  -- Indication: For pain    KlonoPIN 0.5 mg oral tablet  -- 1 tab(s) by mouth once a day (at bedtime)  -- Indication: For psychiatric    prochlorperazine 5 mg/mL injectable solution  -- 10 milligram(s) by mouth 3 times a day, As Needed - for nausea  -- Indication: For nausea    famotidine 20 mg oral tablet  -- 1 tab(s) by mouth 2 times a day  -- Indication: For GI upset    MiraLax oral powder for reconstitution  -- 17 gram(s) by mouth once a day  -- Indication: For constipation    cyclobenzaprine 10 mg oral tablet  -- 1 tab(s) by mouth every 8 hours, As needed, Muscle Spasm  -- Indication: For spasms    buPROPion 300 mg/24 hours (XL) oral tablet, extended release  -- 1 tab(s) by mouth every 24 hours  -- Indication: For psychiatric    levothyroxine 50 mcg (0.05 mg) oral tablet  -- 1 tab(s) by mouth 5 times a week  -- Indication: For thyroid    levothyroxine 25 mcg (0.025 mg) oral tablet  -- 1 tab(s) by mouth 2 times a week  Monday, thursday  -- Indication: For thyroid    Vitamin D3 2000 intl units oral tablet  -- 1 tab(s) by mouth once a day  -- Indication: For bone health

## 2018-09-11 NOTE — PROGRESS NOTE ADULT - ASSESSMENT
Pt is a 67 y/o male with h/o anxiety, BPH, chronic LBP, GERD, hypothyroidism who has been having increasing LBP.  He was being followed by Dr Claire, failed PEREZ and all conservative measures therefore he was admitted for elective L2-L5 laminectomy with lumbar fusion.  Postop medicine consult called for comanagement.     * Lumbar Stenosis-failed conservative measures now s/p L2-L5 laminectomy with fusion.  Continue tramadol for pain control, encourage use of incentive spirometry, mobilize with PT.  * Lt sided abd pain-due to constipation, trail of mag citrate, continue colace, miralax.  Enema if mag citrate fails.    * Hypothyroidism-synthroid  * Acute Blood Loss Anemia-due to surgical loss, monitor.  * Leukocytosis-reactive from surgery,  resolved  * Anxiety-bupropion, Klonopin  * GERD- famotidine  * Proph- heparin   * Disp-OOB, PT, d/c planning, after BM he is medically optimized for d/c.    * Comm- d/w pt in details, all questions answered, significant other on phone, RN, CM Pt is a 69 y/o male with h/o anxiety, BPH, chronic LBP, GERD, hypothyroidism who has been having increasing LBP.  He was being followed by Dr Claire, failed PEREZ and all conservative measures therefore he was admitted for elective L2-L5 laminectomy with lumbar fusion.  Postop medicine consult called for comanagement.     * Lumbar Stenosis-failed conservative measures now s/p L2-L5 laminectomy with fusion.  Continue tramadol for pain control, encourage use of incentive spirometry, mobilize with PT.  * Lt sided abd pain-due to constipation, trail of mag citrate, continue colace, miralax.  Enema if mag citrate fails.    * Hypothyroidism-synthroid  * Acute Blood Loss Anemia-due to surgical loss, monitor.  * Leukocytosis-reactive from surgery,  resolved  * Anxiety-bupropion, Klonopin  * GERD- protonix  * Proph- heparin   * Disp-OOB, PT, d/c planning, after BM he is medically optimized for d/c.    * Comm- d/w pt in details, all questions answered, significant other on phone, RN, CM

## 2018-09-11 NOTE — DISCHARGE NOTE ADULT - NS AS ACTIVITY OBS
Showering allowed/Walking-Indoors allowed/Do not make important decisions/Do not drive or operate machinery/Walking-Outdoors allowed/Stairs allowed/No Heavy lifting/straining

## 2018-09-11 NOTE — PROGRESS NOTE ADULT - SUBJECTIVE AND OBJECTIVE BOX
[facility]                                                           Orthopedic Spine Progress Note      POST OPERATIVE DAY #: 6  STATUS POST: L2-5 laminectomy/fusion             Pre-Op Dx: Lumbar stenosis with neurogenic claudication    Post-Op Dx:  Lumbar stenosis with neurogenic claudication    SUBJECTIVE: Patient seen and examined, ambulating better w/PT, AXR c/w constipation, bowel regimen as per medicine, enema if needed, await BM, working on stairs w/PT this am    Current Pain Management:  [ ] PCA   [x] Po Analgesics [ ] IM /IV Anagesics     Vital Signs Last 24 Hrs  T(C): 37.2 (10 Sep 2018 23:20), Max: 37.2 (10 Sep 2018 23:20)  T(F): 99 (10 Sep 2018 23:20), Max: 99 (10 Sep 2018 23:20)  HR: 88 (10 Sep 2018 23:20) (85 - 91)  BP: 97/58 (10 Sep 2018 23:20) (97/58 - 110/67)  BP(mean): --  RR: 16 (10 Sep 2018 23:20) (16 - 18)  SpO2: 95% (10 Sep 2018 23:20) (95% - 99%)  I&O's Detail    10 Sep 2018 07:01  -  11 Sep 2018 07:00  --------------------------------------------------------  IN:  Total IN: 0 mL    OUT:    Voided: 1250 mL  Total OUT: 1250 mL    Total NET: -1250 mL      OBJECTIVE:       Wound: clean, dry, intact  Cervical ROM: wnl  Lumbar: not tested  Neurological: A/O x 3              Sensation: [x] intact to light touch  [ ] decreased:          Motor exam: [x]                 [x] Lower ext.     Hip Flx  Hip Ext   Hip Abd  Hip Add Quad   Hamstrg   TA       EHL      GS                                R        5/5        5/5        5/5         5/5        5/5        5/5        5/5     5/5      5/5                                L         5/5        5/5        5/5         5/5        5/5        5/5        5/5     5/5      5/5                                                        [x] Vascular: intact           Tension Signs: none          Long Tract Findings: none                                                LABS:                        10.8   9.08  )-----------( 211      ( 11 Sep 2018 05:38 )             31.5     09-10    135  |  99  |  13  ----------------------------<  97  4.7   |  28  |  0.83    Ca    9.1      10 Sep 2018 06:20

## 2018-09-16 DIAGNOSIS — E03.9 HYPOTHYROIDISM, UNSPECIFIED: ICD-10-CM

## 2018-09-16 DIAGNOSIS — N40.0 BENIGN PROSTATIC HYPERPLASIA WITHOUT LOWER URINARY TRACT SYMPTOMS: ICD-10-CM

## 2018-09-16 DIAGNOSIS — I95.9 HYPOTENSION, UNSPECIFIED: ICD-10-CM

## 2018-09-16 DIAGNOSIS — I10 ESSENTIAL (PRIMARY) HYPERTENSION: ICD-10-CM

## 2018-09-16 DIAGNOSIS — E44.0 MODERATE PROTEIN-CALORIE MALNUTRITION: ICD-10-CM

## 2018-09-16 DIAGNOSIS — M48.062 SPINAL STENOSIS, LUMBAR REGION WITH NEUROGENIC CLAUDICATION: ICD-10-CM

## 2018-09-16 DIAGNOSIS — F41.9 ANXIETY DISORDER, UNSPECIFIED: ICD-10-CM

## 2018-09-16 DIAGNOSIS — K21.9 GASTRO-ESOPHAGEAL REFLUX DISEASE WITHOUT ESOPHAGITIS: ICD-10-CM

## 2018-09-16 DIAGNOSIS — D62 ACUTE POSTHEMORRHAGIC ANEMIA: ICD-10-CM

## 2018-09-27 NOTE — PATIENT PROFILE ADULT. - NS PRO REFERRAL CMGT
Received call from Dr. Shi. Patient's Na has remained stable and may be made active on the waitlist again.  
None

## 2018-12-11 ENCOUNTER — LABORATORY RESULT (OUTPATIENT)
Age: 68
End: 2018-12-11

## 2018-12-11 ENCOUNTER — APPOINTMENT (OUTPATIENT)
Dept: DERMATOLOGY | Facility: CLINIC | Age: 68
End: 2018-12-11
Payer: MEDICARE

## 2018-12-11 PROCEDURE — 11402 EXC TR-EXT B9+MARG 1.1-2 CM: CPT

## 2018-12-11 PROCEDURE — 99213 OFFICE O/P EST LOW 20 MIN: CPT | Mod: 25

## 2018-12-12 PROBLEM — K21.9 GASTRO-ESOPHAGEAL REFLUX DISEASE WITHOUT ESOPHAGITIS: Chronic | Status: ACTIVE | Noted: 2018-08-28

## 2018-12-12 PROBLEM — E03.9 HYPOTHYROIDISM, UNSPECIFIED: Chronic | Status: ACTIVE | Noted: 2018-08-28

## 2018-12-12 PROBLEM — M54.16 RADICULOPATHY, LUMBAR REGION: Chronic | Status: ACTIVE | Noted: 2018-08-28

## 2018-12-12 PROBLEM — F41.9 ANXIETY DISORDER, UNSPECIFIED: Chronic | Status: ACTIVE | Noted: 2018-08-28

## 2018-12-12 PROBLEM — F32.9 MAJOR DEPRESSIVE DISORDER, SINGLE EPISODE, UNSPECIFIED: Chronic | Status: ACTIVE | Noted: 2018-08-28

## 2018-12-12 PROBLEM — K44.9 DIAPHRAGMATIC HERNIA WITHOUT OBSTRUCTION OR GANGRENE: Chronic | Status: ACTIVE | Noted: 2018-08-28

## 2018-12-12 PROBLEM — M54.9 DORSALGIA, UNSPECIFIED: Chronic | Status: ACTIVE | Noted: 2018-08-28

## 2018-12-12 PROBLEM — M51.26 OTHER INTERVERTEBRAL DISC DISPLACEMENT, LUMBAR REGION: Chronic | Status: ACTIVE | Noted: 2018-08-28

## 2018-12-12 PROBLEM — K59.00 CONSTIPATION, UNSPECIFIED: Chronic | Status: ACTIVE | Noted: 2018-08-28

## 2018-12-12 PROBLEM — I05.0 RHEUMATIC MITRAL STENOSIS: Chronic | Status: ACTIVE | Noted: 2018-08-28

## 2018-12-12 PROBLEM — N40.0 BENIGN PROSTATIC HYPERPLASIA WITHOUT LOWER URINARY TRACT SYMPTOMS: Chronic | Status: ACTIVE | Noted: 2018-08-28

## 2018-12-18 LAB — CORE LAB BIOPSY: NORMAL

## 2018-12-20 ENCOUNTER — APPOINTMENT (OUTPATIENT)
Dept: DERMATOLOGY | Facility: CLINIC | Age: 68
End: 2018-12-20
Payer: MEDICARE

## 2018-12-20 PROCEDURE — 99024 POSTOP FOLLOW-UP VISIT: CPT

## 2019-02-03 PROBLEM — L25.5 RHUS DERMATITIS: Status: ACTIVE | Noted: 2018-05-24

## 2019-06-29 ENCOUNTER — APPOINTMENT (OUTPATIENT)
Dept: MRI IMAGING | Facility: CLINIC | Age: 69
End: 2019-06-29
Payer: MEDICARE

## 2019-06-29 ENCOUNTER — OUTPATIENT (OUTPATIENT)
Dept: OUTPATIENT SERVICES | Facility: HOSPITAL | Age: 69
LOS: 1 days | End: 2019-06-29
Payer: MEDICARE

## 2019-06-29 DIAGNOSIS — Z98.890 OTHER SPECIFIED POSTPROCEDURAL STATES: Chronic | ICD-10-CM

## 2019-06-29 DIAGNOSIS — Z41.9 ENCOUNTER FOR PROCEDURE FOR PURPOSES OTHER THAN REMEDYING HEALTH STATE, UNSPECIFIED: Chronic | ICD-10-CM

## 2019-06-29 DIAGNOSIS — Z00.8 ENCOUNTER FOR OTHER GENERAL EXAMINATION: ICD-10-CM

## 2019-06-29 PROCEDURE — 73221 MRI JOINT UPR EXTREM W/O DYE: CPT | Mod: 26,LT

## 2019-06-29 PROCEDURE — 73221 MRI JOINT UPR EXTREM W/O DYE: CPT

## 2019-07-08 NOTE — H&P PST ADULT - EYES
HR 90s to low 100s. Sats high 80s to mid 90s on RA. Anxious appearing. no stridor.   +white mucus suctioned from trach. good air entry b/l. trach appears in place. EOMI; PERRL; no drainage or redness

## 2019-07-09 ENCOUNTER — APPOINTMENT (OUTPATIENT)
Dept: UROLOGY | Facility: CLINIC | Age: 69
End: 2019-07-09
Payer: MEDICARE

## 2019-07-09 VITALS
OXYGEN SATURATION: 98 % | HEART RATE: 67 BPM | HEIGHT: 73 IN | BODY MASS INDEX: 23.59 KG/M2 | SYSTOLIC BLOOD PRESSURE: 112 MMHG | DIASTOLIC BLOOD PRESSURE: 69 MMHG | TEMPERATURE: 98.2 F | WEIGHT: 178 LBS

## 2019-07-09 DIAGNOSIS — Z87.39 PERSONAL HISTORY OF OTHER DISEASES OF THE MUSCULOSKELETAL SYSTEM AND CONNECTIVE TISSUE: ICD-10-CM

## 2019-07-09 PROCEDURE — 99213 OFFICE O/P EST LOW 20 MIN: CPT

## 2019-07-10 PROBLEM — Z87.39 HISTORY OF HERNIATED INTERVERTEBRAL DISC: Status: RESOLVED | Noted: 2019-07-09 | Resolved: 2019-07-10

## 2019-07-10 NOTE — REVIEW OF SYSTEMS
[Abdominal Pain] : abdominal pain [see HPI] : see HPI [Dizziness] : dizziness [Negative] : Heme/Lymph

## 2019-07-10 NOTE — HISTORY OF PRESENT ILLNESS
[FreeTextEntry1] : 67 yo M presents for follow up. He has history of BPH with LUTS. He poorly tolerated alpha blockers and  anticholinergics and is interested in TURP procedure. He also reports ED continues, with good rigidity but very poor duration. No improvement with sildenafil in the past. All symptoms have been ongoing for years. Nothing makes them better, nothing makes them worse. They are associated with nothing.

## 2019-07-10 NOTE — PHYSICAL EXAM
[General Appearance - Well Developed] : well developed [General Appearance - Well Nourished] : well nourished [Normal Appearance] : normal appearance [Well Groomed] : well groomed [General Appearance - In No Acute Distress] : no acute distress [Abdomen Soft] : soft [Abdomen Tenderness] : non-tender [Costovertebral Angle Tenderness] : no ~M costovertebral angle tenderness [Urinary Bladder Findings] : the bladder was normal on palpation [FreeTextEntry1] : Ringed rash on RIGHT groin, c/w ringworm [Edema] : no peripheral edema [] : no respiratory distress [Respiration, Rhythm And Depth] : normal respiratory rhythm and effort [Oriented To Time, Place, And Person] : oriented to person, place, and time [Exaggerated Use Of Accessory Muscles For Inspiration] : no accessory muscle use [Affect] : the affect was normal [Mood] : the mood was normal [Normal Station and Gait] : the gait and station were normal for the patient's age [Not Anxious] : not anxious [No Focal Deficits] : no focal deficits [No Palpable Adenopathy] : no palpable adenopathy

## 2019-07-10 NOTE — ASSESSMENT
[FreeTextEntry1] : 68 yo M with Erectile dysfunction and BPH with LUTS. \par \par For ED, we discussed that ED etiology can be psychogenic, arterial, venous leak, neurologic or a combination. Penile Ultrasound can be performed to evaluate cardiovascular causes. We discussed treatment options include oral PDE5 inhibitors, intraurethral alprostadil, intracavernosal injections, or penile implant placement surgery. Pt will think about his options.\par \par The risks, benefits, and alternatives of Transurethral Resection of Prostate were discussed with the patient. The patient was told that a specialized cystoscope is advanced into the prostatic urethra and obstructive prostatic tissue bulging into the lumen of the urethra is resected. Bleeding is controlled with cautery. A CREAT evacuator is used irrigate the bladder and evacuate clot and TURP chips from the bladder. These prostate chips are then sent for pathologic evaluation. A liz catheter is left in place at the end of the procedure, and continuous bladder irrigation is performed overnight. The following morning, CBI is held and the patient is discharged with the liz catheter to gravity. The catheter is then removed the following week. Risks include bladder injury, urethral injury, incontinence, urinary retention, persistent bleeding which may require prolonged CBI or being taken back to the OR for cautery. We discussed that preop planning with cystoscopy and TRUS to visualize bladder neck and quantify the volume of the prostate, would be required prior to booking for procedure.  Pt agrees to these procedures. All questions and concerns were answered and addressed.

## 2019-07-23 ENCOUNTER — APPOINTMENT (OUTPATIENT)
Dept: UROLOGY | Facility: CLINIC | Age: 69
End: 2019-07-23
Payer: MEDICARE

## 2019-07-23 VITALS — SYSTOLIC BLOOD PRESSURE: 118 MMHG | HEART RATE: 76 BPM | DIASTOLIC BLOOD PRESSURE: 79 MMHG

## 2019-07-23 DIAGNOSIS — R30.0 DYSURIA: ICD-10-CM

## 2019-07-23 PROCEDURE — 52000 CYSTOURETHROSCOPY: CPT

## 2019-07-23 PROCEDURE — 76872 US TRANSRECTAL: CPT

## 2019-07-24 ENCOUNTER — TRANSCRIPTION ENCOUNTER (OUTPATIENT)
Age: 69
End: 2019-07-24

## 2019-07-25 ENCOUNTER — LABORATORY RESULT (OUTPATIENT)
Age: 69
End: 2019-07-25

## 2019-07-25 ENCOUNTER — EMERGENCY (EMERGENCY)
Facility: HOSPITAL | Age: 69
LOS: 0 days | Discharge: HOME | End: 2019-07-25
Attending: STUDENT IN AN ORGANIZED HEALTH CARE EDUCATION/TRAINING PROGRAM
Payer: MEDICARE

## 2019-07-25 VITALS — HEIGHT: 73 IN | WEIGHT: 169.98 LBS

## 2019-07-25 VITALS
SYSTOLIC BLOOD PRESSURE: 107 MMHG | OXYGEN SATURATION: 98 % | TEMPERATURE: 99 F | HEART RATE: 90 BPM | DIASTOLIC BLOOD PRESSURE: 70 MMHG | RESPIRATION RATE: 16 BRPM

## 2019-07-25 DIAGNOSIS — N39.0 URINARY TRACT INFECTION, SITE NOT SPECIFIED: ICD-10-CM

## 2019-07-25 DIAGNOSIS — I34.2 NONRHEUMATIC MITRAL (VALVE) STENOSIS: ICD-10-CM

## 2019-07-25 DIAGNOSIS — R30.0 DYSURIA: ICD-10-CM

## 2019-07-25 DIAGNOSIS — R50.9 FEVER, UNSPECIFIED: ICD-10-CM

## 2019-07-25 DIAGNOSIS — Z41.9 ENCOUNTER FOR PROCEDURE FOR PURPOSES OTHER THAN REMEDYING HEALTH STATE, UNSPECIFIED: Chronic | ICD-10-CM

## 2019-07-25 DIAGNOSIS — K21.9 GASTRO-ESOPHAGEAL REFLUX DISEASE WITHOUT ESOPHAGITIS: ICD-10-CM

## 2019-07-25 DIAGNOSIS — M54.9 DORSALGIA, UNSPECIFIED: ICD-10-CM

## 2019-07-25 DIAGNOSIS — N40.0 BENIGN PROSTATIC HYPERPLASIA WITHOUT LOWER URINARY TRACT SYMPTOMS: ICD-10-CM

## 2019-07-25 DIAGNOSIS — Z98.890 OTHER SPECIFIED POSTPROCEDURAL STATES: Chronic | ICD-10-CM

## 2019-07-25 DIAGNOSIS — R33.9 RETENTION OF URINE, UNSPECIFIED: ICD-10-CM

## 2019-07-25 DIAGNOSIS — E03.9 HYPOTHYROIDISM, UNSPECIFIED: ICD-10-CM

## 2019-07-25 LAB
ALBUMIN SERPL ELPH-MCNC: 3.7 G/DL — SIGNIFICANT CHANGE UP (ref 3.3–5)
ALP SERPL-CCNC: 78 U/L — SIGNIFICANT CHANGE UP (ref 40–120)
ALT FLD-CCNC: 60 U/L — SIGNIFICANT CHANGE UP (ref 12–78)
ANION GAP SERPL CALC-SCNC: 7 MMOL/L — SIGNIFICANT CHANGE UP (ref 5–17)
APPEARANCE UR: ABNORMAL
APTT BLD: 27.3 SEC — LOW (ref 27.5–36.3)
AST SERPL-CCNC: 47 U/L — HIGH (ref 15–37)
BASOPHILS # BLD AUTO: 0.09 K/UL — SIGNIFICANT CHANGE UP (ref 0–0.2)
BASOPHILS NFR BLD AUTO: 0.6 % — SIGNIFICANT CHANGE UP (ref 0–2)
BILIRUB SERPL-MCNC: 0.9 MG/DL — SIGNIFICANT CHANGE UP (ref 0.2–1.2)
BILIRUB UR-MCNC: NEGATIVE — SIGNIFICANT CHANGE UP
BUN SERPL-MCNC: 13 MG/DL — SIGNIFICANT CHANGE UP (ref 7–23)
CALCIUM SERPL-MCNC: 9.4 MG/DL — SIGNIFICANT CHANGE UP (ref 8.5–10.1)
CHLORIDE SERPL-SCNC: 102 MMOL/L — SIGNIFICANT CHANGE UP (ref 96–108)
CO2 SERPL-SCNC: 25 MMOL/L — SIGNIFICANT CHANGE UP (ref 22–31)
COLOR SPEC: YELLOW — SIGNIFICANT CHANGE UP
CREAT SERPL-MCNC: 1.18 MG/DL — SIGNIFICANT CHANGE UP (ref 0.5–1.3)
DIFF PNL FLD: ABNORMAL
EOSINOPHIL # BLD AUTO: 0.02 K/UL — SIGNIFICANT CHANGE UP (ref 0–0.5)
EOSINOPHIL NFR BLD AUTO: 0.1 % — SIGNIFICANT CHANGE UP (ref 0–6)
GLUCOSE SERPL-MCNC: 106 MG/DL — HIGH (ref 70–99)
GLUCOSE UR QL: NEGATIVE MG/DL — SIGNIFICANT CHANGE UP
HCT VFR BLD CALC: 44.1 % — SIGNIFICANT CHANGE UP (ref 39–50)
HGB BLD-MCNC: 14.7 G/DL — SIGNIFICANT CHANGE UP (ref 13–17)
IMM GRANULOCYTES NFR BLD AUTO: 0.6 % — SIGNIFICANT CHANGE UP (ref 0–1.5)
INR BLD: 1.24 RATIO — HIGH (ref 0.88–1.16)
KETONES UR-MCNC: ABNORMAL
LACTATE SERPL-SCNC: 1.3 MMOL/L — SIGNIFICANT CHANGE UP (ref 0.7–2)
LEUKOCYTE ESTERASE UR-ACNC: ABNORMAL
LYMPHOCYTES # BLD AUTO: 0.94 K/UL — LOW (ref 1–3.3)
LYMPHOCYTES # BLD AUTO: 6.1 % — LOW (ref 13–44)
MCHC RBC-ENTMCNC: 30.8 PG — SIGNIFICANT CHANGE UP (ref 27–34)
MCHC RBC-ENTMCNC: 33.3 GM/DL — SIGNIFICANT CHANGE UP (ref 32–36)
MCV RBC AUTO: 92.3 FL — SIGNIFICANT CHANGE UP (ref 80–100)
MONOCYTES # BLD AUTO: 1.18 K/UL — HIGH (ref 0–0.9)
MONOCYTES NFR BLD AUTO: 7.6 % — SIGNIFICANT CHANGE UP (ref 2–14)
NEUTROPHILS # BLD AUTO: 13.13 K/UL — HIGH (ref 1.8–7.4)
NEUTROPHILS NFR BLD AUTO: 85 % — HIGH (ref 43–77)
NITRITE UR-MCNC: POSITIVE
PH UR: 5 — SIGNIFICANT CHANGE UP (ref 5–8)
PLATELET # BLD AUTO: 162 K/UL — SIGNIFICANT CHANGE UP (ref 150–400)
POTASSIUM SERPL-MCNC: 3.8 MMOL/L — SIGNIFICANT CHANGE UP (ref 3.5–5.3)
POTASSIUM SERPL-SCNC: 3.8 MMOL/L — SIGNIFICANT CHANGE UP (ref 3.5–5.3)
PROT SERPL-MCNC: 7.2 GM/DL — SIGNIFICANT CHANGE UP (ref 6–8.3)
PROT UR-MCNC: 100 MG/DL
PROTHROM AB SERPL-ACNC: 13.9 SEC — HIGH (ref 10–12.9)
RBC # BLD: 4.78 M/UL — SIGNIFICANT CHANGE UP (ref 4.2–5.8)
RBC # FLD: 13.6 % — SIGNIFICANT CHANGE UP (ref 10.3–14.5)
SODIUM SERPL-SCNC: 134 MMOL/L — LOW (ref 135–145)
SP GR SPEC: 1.01 — SIGNIFICANT CHANGE UP (ref 1.01–1.02)
UROBILINOGEN FLD QL: 1 MG/DL
WBC # BLD: 15.46 K/UL — HIGH (ref 3.8–10.5)
WBC # FLD AUTO: 15.46 K/UL — HIGH (ref 3.8–10.5)

## 2019-07-25 PROCEDURE — 99284 EMERGENCY DEPT VISIT MOD MDM: CPT

## 2019-07-25 PROCEDURE — 71046 X-RAY EXAM CHEST 2 VIEWS: CPT | Mod: 26

## 2019-07-25 RX ORDER — ACETAMINOPHEN 500 MG
650 TABLET ORAL ONCE
Refills: 0 | Status: COMPLETED | OUTPATIENT
Start: 2019-07-25 | End: 2019-07-25

## 2019-07-25 RX ORDER — SODIUM CHLORIDE 9 MG/ML
2400 INJECTION, SOLUTION INTRAVENOUS ONCE
Refills: 0 | Status: COMPLETED | OUTPATIENT
Start: 2019-07-25 | End: 2019-07-25

## 2019-07-25 RX ORDER — CEFPODOXIME PROXETIL 100 MG
1 TABLET ORAL
Qty: 20 | Refills: 0
Start: 2019-07-25 | End: 2019-08-03

## 2019-07-25 RX ORDER — CEFTRIAXONE 500 MG/1
1000 INJECTION, POWDER, FOR SOLUTION INTRAMUSCULAR; INTRAVENOUS ONCE
Refills: 0 | Status: COMPLETED | OUTPATIENT
Start: 2019-07-25 | End: 2019-07-25

## 2019-07-25 RX ADMIN — Medication 650 MILLIGRAM(S): at 17:15

## 2019-07-25 RX ADMIN — SODIUM CHLORIDE 2400 MILLILITER(S): 9 INJECTION, SOLUTION INTRAVENOUS at 17:15

## 2019-07-25 RX ADMIN — CEFTRIAXONE 1000 MILLIGRAM(S): 500 INJECTION, POWDER, FOR SOLUTION INTRAMUSCULAR; INTRAVENOUS at 18:41

## 2019-07-25 NOTE — ED ADULT NURSE NOTE - CAS EDN DISCHARGE INTERVENTIONS
Katie Peters notified of instructions, she will take him on Thursday to get labs done.  She states his BP was 140/79 at 120 pm IV discontinued, cath removed intact

## 2019-07-25 NOTE — ED ADULT TRIAGE NOTE - CHIEF COMPLAINT QUOTE
pt c/o 102.0 fever s/p cystoscopy on july 23 , Pt is supposed  to have TURP but has been experiencing nausea, stomach pain , weakness , excessive  burping ; sent from Dr. Baptiste office .

## 2019-07-25 NOTE — ED STATDOCS - PROGRESS NOTE DETAILS
70 y/o m with PMHx of mitral stenosis, BPH, chronic back pain, hypothyroid, anxiety/depression, GERD presenting to the ED c/o fever for past few days. Tmax of 102. Reports cystoscopy and rectal US 7/23, scheduled for TURP, follows up with Dr. Baptiste. Reports nausea, abd pain, urinary retention and dysuria since procedure. -Oliver Castro PA-C Call placed to Dr. Baptiste. -Oliver Castro PA-C Rpt call placed to Dr. tuttle. -Oliver Castro PA-C Results reviewed and discussed with pt. UA+for UTI. Have not heard back from Dr. tuttle. Will change abx to vantin. Pt is otherwise well appearing with normal vitals at time of dc. Pt to FU with Dr. tuttle tomorrow. Discussed importance of close FU with PMD. Pt asked to return to ED immediately for any new or concerning sx or worsening. Pt acknowledges and understands plan -Oliver Castro PA-C Results reviewed and discussed with pt. UA+for UTI. Will change abx to vantin. Pt is otherwise well appearing with normal vitals at time of dc. Dr. tuttle agrees with plan. Pt to FU with Dr. tuttle tomorrow. Discussed importance of close FU with PMD. Pt asked to return to ED immediately for any new or concerning sx or worsening. Pt acknowledges and understands plan -Oliver Castro PA-C

## 2019-07-25 NOTE — ED STATDOCS - ATTENDING CONTRIBUTION TO CARE
I, Ana Connolly MD,  performed the initial face to face bedside interview with this patient regarding history of present illness, review of symptoms and relevant past medical, social and family history.  I completed an independent physical examination.  I was the initial provider who evaluated this patient. I have signed out the follow up of any pending tests (i.e. labs, radiological studies) to the ACP.  I have communicated the patient’s plan of care and disposition with the ACP.  The history, relevant review of systems, past medical and surgical history, medical decision making, and physical examination was documented by the scribe in my presence and I attest to the accuracy of the documentation.

## 2019-07-25 NOTE — ED STATDOCS - CLINICAL SUMMARY MEDICAL DECISION MAKING FREE TEXT BOX
68 y/o m 2 days s/p cystoscopy, presenting with discomfort yupon urination and fever of 102, likely UTI but given systemic sx, concern for progression to pyelo. Plan for labs, cultures, fluids, Tylenol, will discuss with Dr. Baptiste regarding changing abx.

## 2019-07-25 NOTE — ED STATDOCS - NSPTACCESSSVCSAPPTDETAILS_ED_ALL_ED_FT
Please Follow up with Dr. Baptiste in 24-48 hours. Return to ED immediately for any new or concerning symptoms or worsening symptoms.

## 2019-07-25 NOTE — ED STATDOCS - OBJECTIVE STATEMENT
68 y/o m with PMHx of mitral stenosis, BPH, chronic back pain, hypothyroid, anxiety/depression, GERD presenting to the ED c/o fever for past few days. Tmax of 102. Reports cystoscopy and rectal US 7/23, scheduled for TURP, follows up with Dr. Baptiste. Reports nausea, abd pain, urinary retention and dysuria since procedure. Took 2 Tylenol and 1 Microbid as recommended by Dr. Baptiste today.

## 2019-07-30 LAB
CULTURE RESULTS: SIGNIFICANT CHANGE UP
CULTURE RESULTS: SIGNIFICANT CHANGE UP
SPECIMEN SOURCE: SIGNIFICANT CHANGE UP
SPECIMEN SOURCE: SIGNIFICANT CHANGE UP

## 2019-08-08 ENCOUNTER — APPOINTMENT (OUTPATIENT)
Dept: UROLOGY | Facility: CLINIC | Age: 69
End: 2019-08-08

## 2019-08-13 ENCOUNTER — APPOINTMENT (OUTPATIENT)
Dept: CT IMAGING | Facility: CLINIC | Age: 69
End: 2019-08-13
Payer: MEDICARE

## 2019-08-13 ENCOUNTER — OUTPATIENT (OUTPATIENT)
Dept: OUTPATIENT SERVICES | Facility: HOSPITAL | Age: 69
LOS: 1 days | End: 2019-08-13
Payer: MEDICARE

## 2019-08-13 DIAGNOSIS — Z41.9 ENCOUNTER FOR PROCEDURE FOR PURPOSES OTHER THAN REMEDYING HEALTH STATE, UNSPECIFIED: Chronic | ICD-10-CM

## 2019-08-13 DIAGNOSIS — Z00.8 ENCOUNTER FOR OTHER GENERAL EXAMINATION: ICD-10-CM

## 2019-08-13 DIAGNOSIS — Z98.890 OTHER SPECIFIED POSTPROCEDURAL STATES: Chronic | ICD-10-CM

## 2019-08-13 PROCEDURE — 74160 CT ABDOMEN W/CONTRAST: CPT | Mod: 26

## 2019-08-13 PROCEDURE — 71250 CT THORAX DX C-: CPT | Mod: 26

## 2019-08-13 PROCEDURE — 74160 CT ABDOMEN W/CONTRAST: CPT

## 2019-08-13 PROCEDURE — 71250 CT THORAX DX C-: CPT

## 2019-08-20 ENCOUNTER — OUTPATIENT (OUTPATIENT)
Dept: OUTPATIENT SERVICES | Facility: HOSPITAL | Age: 69
LOS: 1 days | End: 2019-08-20
Payer: MEDICARE

## 2019-08-20 VITALS
HEIGHT: 73 IN | TEMPERATURE: 98 F | OXYGEN SATURATION: 100 % | DIASTOLIC BLOOD PRESSURE: 70 MMHG | RESPIRATION RATE: 20 BRPM | HEART RATE: 73 BPM | SYSTOLIC BLOOD PRESSURE: 119 MMHG

## 2019-08-20 DIAGNOSIS — Z98.890 OTHER SPECIFIED POSTPROCEDURAL STATES: Chronic | ICD-10-CM

## 2019-08-20 DIAGNOSIS — R33.9 RETENTION OF URINE, UNSPECIFIED: ICD-10-CM

## 2019-08-20 DIAGNOSIS — Z01.818 ENCOUNTER FOR OTHER PREPROCEDURAL EXAMINATION: ICD-10-CM

## 2019-08-20 DIAGNOSIS — N40.1 BENIGN PROSTATIC HYPERPLASIA WITH LOWER URINARY TRACT SYMPTOMS: ICD-10-CM

## 2019-08-20 DIAGNOSIS — Z41.9 ENCOUNTER FOR PROCEDURE FOR PURPOSES OTHER THAN REMEDYING HEALTH STATE, UNSPECIFIED: Chronic | ICD-10-CM

## 2019-08-20 DIAGNOSIS — Z98.1 ARTHRODESIS STATUS: Chronic | ICD-10-CM

## 2019-08-20 LAB
ANION GAP SERPL CALC-SCNC: 6 MMOL/L — SIGNIFICANT CHANGE UP (ref 5–17)
APPEARANCE UR: CLEAR — SIGNIFICANT CHANGE UP
APTT BLD: 27.1 SEC — LOW (ref 27.5–36.3)
BASOPHILS # BLD AUTO: 0.11 K/UL — SIGNIFICANT CHANGE UP (ref 0–0.2)
BASOPHILS NFR BLD AUTO: 1.6 % — SIGNIFICANT CHANGE UP (ref 0–2)
BILIRUB UR-MCNC: NEGATIVE — SIGNIFICANT CHANGE UP
BUN SERPL-MCNC: 13 MG/DL — SIGNIFICANT CHANGE UP (ref 7–23)
CALCIUM SERPL-MCNC: 9.6 MG/DL — SIGNIFICANT CHANGE UP (ref 8.5–10.1)
CHLORIDE SERPL-SCNC: 104 MMOL/L — SIGNIFICANT CHANGE UP (ref 96–108)
CO2 SERPL-SCNC: 29 MMOL/L — SIGNIFICANT CHANGE UP (ref 22–31)
COLOR SPEC: YELLOW — SIGNIFICANT CHANGE UP
CREAT SERPL-MCNC: 1.12 MG/DL — SIGNIFICANT CHANGE UP (ref 0.5–1.3)
DIFF PNL FLD: NEGATIVE — SIGNIFICANT CHANGE UP
EOSINOPHIL # BLD AUTO: 0.22 K/UL — SIGNIFICANT CHANGE UP (ref 0–0.5)
EOSINOPHIL NFR BLD AUTO: 3.3 % — SIGNIFICANT CHANGE UP (ref 0–6)
GLUCOSE SERPL-MCNC: 84 MG/DL — SIGNIFICANT CHANGE UP (ref 70–99)
GLUCOSE UR QL: NEGATIVE MG/DL — SIGNIFICANT CHANGE UP
HCT VFR BLD CALC: 43.4 % — SIGNIFICANT CHANGE UP (ref 39–50)
HGB BLD-MCNC: 14.6 G/DL — SIGNIFICANT CHANGE UP (ref 13–17)
IMM GRANULOCYTES NFR BLD AUTO: 0.3 % — SIGNIFICANT CHANGE UP (ref 0–1.5)
INR BLD: 1.12 RATIO — SIGNIFICANT CHANGE UP (ref 0.88–1.16)
KETONES UR-MCNC: NEGATIVE — SIGNIFICANT CHANGE UP
LEUKOCYTE ESTERASE UR-ACNC: ABNORMAL
LYMPHOCYTES # BLD AUTO: 1.19 K/UL — SIGNIFICANT CHANGE UP (ref 1–3.3)
LYMPHOCYTES # BLD AUTO: 17.8 % — SIGNIFICANT CHANGE UP (ref 13–44)
MCHC RBC-ENTMCNC: 31 PG — SIGNIFICANT CHANGE UP (ref 27–34)
MCHC RBC-ENTMCNC: 33.6 GM/DL — SIGNIFICANT CHANGE UP (ref 32–36)
MCV RBC AUTO: 92.1 FL — SIGNIFICANT CHANGE UP (ref 80–100)
MONOCYTES # BLD AUTO: 0.56 K/UL — SIGNIFICANT CHANGE UP (ref 0–0.9)
MONOCYTES NFR BLD AUTO: 8.4 % — SIGNIFICANT CHANGE UP (ref 2–14)
NEUTROPHILS # BLD AUTO: 4.57 K/UL — SIGNIFICANT CHANGE UP (ref 1.8–7.4)
NEUTROPHILS NFR BLD AUTO: 68.6 % — SIGNIFICANT CHANGE UP (ref 43–77)
NITRITE UR-MCNC: NEGATIVE — SIGNIFICANT CHANGE UP
PH UR: 5 — SIGNIFICANT CHANGE UP (ref 5–8)
PLATELET # BLD AUTO: 190 K/UL — SIGNIFICANT CHANGE UP (ref 150–400)
POTASSIUM SERPL-MCNC: 4.1 MMOL/L — SIGNIFICANT CHANGE UP (ref 3.5–5.3)
POTASSIUM SERPL-SCNC: 4.1 MMOL/L — SIGNIFICANT CHANGE UP (ref 3.5–5.3)
PROT UR-MCNC: NEGATIVE MG/DL — SIGNIFICANT CHANGE UP
PROTHROM AB SERPL-ACNC: 12.5 SEC — SIGNIFICANT CHANGE UP (ref 10–12.9)
RBC # BLD: 4.71 M/UL — SIGNIFICANT CHANGE UP (ref 4.2–5.8)
RBC # FLD: 13.8 % — SIGNIFICANT CHANGE UP (ref 10.3–14.5)
SODIUM SERPL-SCNC: 139 MMOL/L — SIGNIFICANT CHANGE UP (ref 135–145)
SP GR SPEC: 1.02 — SIGNIFICANT CHANGE UP (ref 1.01–1.02)
UROBILINOGEN FLD QL: NEGATIVE MG/DL — SIGNIFICANT CHANGE UP
WBC # BLD: 6.67 K/UL — SIGNIFICANT CHANGE UP (ref 3.8–10.5)
WBC # FLD AUTO: 6.67 K/UL — SIGNIFICANT CHANGE UP (ref 3.8–10.5)

## 2019-08-20 PROCEDURE — 86901 BLOOD TYPING SEROLOGIC RH(D): CPT

## 2019-08-20 PROCEDURE — 93010 ELECTROCARDIOGRAM REPORT: CPT

## 2019-08-20 PROCEDURE — 85730 THROMBOPLASTIN TIME PARTIAL: CPT

## 2019-08-20 PROCEDURE — 36415 COLL VENOUS BLD VENIPUNCTURE: CPT

## 2019-08-20 PROCEDURE — 85025 COMPLETE CBC W/AUTO DIFF WBC: CPT

## 2019-08-20 PROCEDURE — 86850 RBC ANTIBODY SCREEN: CPT

## 2019-08-20 PROCEDURE — 87086 URINE CULTURE/COLONY COUNT: CPT

## 2019-08-20 PROCEDURE — 85610 PROTHROMBIN TIME: CPT

## 2019-08-20 PROCEDURE — 81001 URINALYSIS AUTO W/SCOPE: CPT

## 2019-08-20 PROCEDURE — 86900 BLOOD TYPING SEROLOGIC ABO: CPT

## 2019-08-20 PROCEDURE — 80048 BASIC METABOLIC PNL TOTAL CA: CPT

## 2019-08-20 PROCEDURE — 93005 ELECTROCARDIOGRAM TRACING: CPT

## 2019-08-20 PROCEDURE — G0463: CPT | Mod: 25

## 2019-08-20 RX ORDER — ACETAMINOPHEN 500 MG
1 TABLET ORAL
Qty: 0 | Refills: 0 | DISCHARGE

## 2019-08-20 RX ORDER — FAMOTIDINE 10 MG/ML
1 INJECTION INTRAVENOUS
Qty: 0 | Refills: 0 | DISCHARGE

## 2019-08-20 RX ORDER — CHOLECALCIFEROL (VITAMIN D3) 125 MCG
1 CAPSULE ORAL
Qty: 0 | Refills: 0 | DISCHARGE

## 2019-08-20 RX ORDER — CLONAZEPAM 1 MG
1 TABLET ORAL
Qty: 0 | Refills: 0 | DISCHARGE

## 2019-08-20 NOTE — H&P PST ADULT - NSICDXPASTSURGICALHX_GEN_ALL_CORE_FT
PAST SURGICAL HISTORY:  Elective surgery lumbar PEREZ x 2    H/O endoscopy 08/2019    H/O right inguinal hernia repair 2008    S/P colonoscopy     S/P lumbar spinal fusion 0/2018

## 2019-08-20 NOTE — H&P PST ADULT - HISTORY OF PRESENT ILLNESS
70 y/o male with BPH. Complain of urine urgency, slow urine flow. Denies hematuria. Scheduled for Transurethral resection of prostate.

## 2019-08-20 NOTE — H&P PST ADULT - NSICDXPASTMEDICALHX_GEN_ALL_CORE_FT
PAST MEDICAL HISTORY:  Anxiety disorder     Back pain     BPH (benign prostatic hyperplasia)     Bundle branch block     Cataract     Constipation     Depression     Frozen shoulder left    GERD (gastroesophageal reflux disease)     Hiatal hernia     Hypothyroid     Lumbar disc herniation     Lumbar radiculopathy     Mitral stenosis     Renal cyst left  and right

## 2019-08-20 NOTE — H&P PST ADULT - ENMT
Returned pt call gave pt orders/results per Luana Pt verbalized understanding.   negative No oral lesions; no gross abnormalities

## 2019-08-20 NOTE — H&P PST ADULT - NSICDXFAMILYHX_GEN_ALL_CORE_FT
FAMILY HISTORY:  Mother  Still living? No  Family history of diabetes mellitus, Age at diagnosis: Age Unknown

## 2019-08-20 NOTE — H&P PST ADULT - ASSESSMENT
Plan  1. Stop all NSAIDS, herbal supplements and vitamins for 7 days.  2. NPO at midnight.  3. Take the following medications ( ) with small sips of water on the morning of your procedure/surgery.  4. Labs, EKG as per surgeon. CX  5. PMD visit for optimization prior to surgery as per surgeon 70 y/o male with BPH. Scheduled for Transurethral resection of prostate.   Plan  1. Stop all NSAIDS, herbal supplements and vitamins for 7 days.  2. NPO at midnight.  3. Take the following medications ( pantoprazole, Levothyroxine, Bupropion ) with small sips of water on the morning of your procedure/surgery.  4. Labs, EKG as per surgeon. CXR on chart.  5. PMD visit for optimization prior to surgery as per surgeon

## 2019-08-21 DIAGNOSIS — N40.1 BENIGN PROSTATIC HYPERPLASIA WITH LOWER URINARY TRACT SYMPTOMS: ICD-10-CM

## 2019-08-21 DIAGNOSIS — Z01.818 ENCOUNTER FOR OTHER PREPROCEDURAL EXAMINATION: ICD-10-CM

## 2019-08-21 DIAGNOSIS — R33.9 RETENTION OF URINE, UNSPECIFIED: ICD-10-CM

## 2019-08-21 LAB
CULTURE RESULTS: NO GROWTH — SIGNIFICANT CHANGE UP
SPECIMEN SOURCE: SIGNIFICANT CHANGE UP

## 2019-08-23 LAB — BACTERIA UR CULT: NORMAL

## 2019-08-28 ENCOUNTER — OUTPATIENT (OUTPATIENT)
Dept: INPATIENT UNIT | Facility: HOSPITAL | Age: 69
LOS: 1 days | Discharge: HOME | End: 2019-08-28
Payer: MEDICARE

## 2019-08-28 ENCOUNTER — RESULT REVIEW (OUTPATIENT)
Age: 69
End: 2019-08-28

## 2019-08-28 ENCOUNTER — OTHER (OUTPATIENT)
Age: 69
End: 2019-08-28

## 2019-08-28 ENCOUNTER — APPOINTMENT (OUTPATIENT)
Age: 69
End: 2019-08-28

## 2019-08-28 VITALS
TEMPERATURE: 98 F | HEIGHT: 73 IN | HEART RATE: 64 BPM | OXYGEN SATURATION: 99 % | DIASTOLIC BLOOD PRESSURE: 75 MMHG | RESPIRATION RATE: 16 BRPM | WEIGHT: 177.91 LBS | SYSTOLIC BLOOD PRESSURE: 122 MMHG

## 2019-08-28 DIAGNOSIS — Z98.890 OTHER SPECIFIED POSTPROCEDURAL STATES: Chronic | ICD-10-CM

## 2019-08-28 DIAGNOSIS — Z98.1 ARTHRODESIS STATUS: Chronic | ICD-10-CM

## 2019-08-28 DIAGNOSIS — R33.9 RETENTION OF URINE, UNSPECIFIED: ICD-10-CM

## 2019-08-28 DIAGNOSIS — N40.1 BENIGN PROSTATIC HYPERPLASIA WITH LOWER URINARY TRACT SYMPTOMS: ICD-10-CM

## 2019-08-28 DIAGNOSIS — Z41.9 ENCOUNTER FOR PROCEDURE FOR PURPOSES OTHER THAN REMEDYING HEALTH STATE, UNSPECIFIED: Chronic | ICD-10-CM

## 2019-08-28 LAB
ANION GAP SERPL CALC-SCNC: 4 MMOL/L — LOW (ref 5–17)
BASOPHILS # BLD AUTO: 0.07 K/UL — SIGNIFICANT CHANGE UP (ref 0–0.2)
BASOPHILS NFR BLD AUTO: 1 % — SIGNIFICANT CHANGE UP (ref 0–2)
BUN SERPL-MCNC: 14 MG/DL — SIGNIFICANT CHANGE UP (ref 7–23)
CALCIUM SERPL-MCNC: 8.8 MG/DL — SIGNIFICANT CHANGE UP (ref 8.5–10.1)
CHLORIDE SERPL-SCNC: 109 MMOL/L — HIGH (ref 96–108)
CO2 SERPL-SCNC: 25 MMOL/L — SIGNIFICANT CHANGE UP (ref 22–31)
CREAT SERPL-MCNC: 1.17 MG/DL — SIGNIFICANT CHANGE UP (ref 0.5–1.3)
EOSINOPHIL # BLD AUTO: 0.11 K/UL — SIGNIFICANT CHANGE UP (ref 0–0.5)
EOSINOPHIL NFR BLD AUTO: 1.6 % — SIGNIFICANT CHANGE UP (ref 0–6)
GLUCOSE SERPL-MCNC: 95 MG/DL — SIGNIFICANT CHANGE UP (ref 70–99)
HCT VFR BLD CALC: 40.4 % — SIGNIFICANT CHANGE UP (ref 39–50)
HGB BLD-MCNC: 13.5 G/DL — SIGNIFICANT CHANGE UP (ref 13–17)
IMM GRANULOCYTES NFR BLD AUTO: 0.4 % — SIGNIFICANT CHANGE UP (ref 0–1.5)
LYMPHOCYTES # BLD AUTO: 0.77 K/UL — LOW (ref 1–3.3)
LYMPHOCYTES # BLD AUTO: 11 % — LOW (ref 13–44)
MCHC RBC-ENTMCNC: 31.2 PG — SIGNIFICANT CHANGE UP (ref 27–34)
MCHC RBC-ENTMCNC: 33.4 GM/DL — SIGNIFICANT CHANGE UP (ref 32–36)
MCV RBC AUTO: 93.3 FL — SIGNIFICANT CHANGE UP (ref 80–100)
MONOCYTES # BLD AUTO: 0.25 K/UL — SIGNIFICANT CHANGE UP (ref 0–0.9)
MONOCYTES NFR BLD AUTO: 3.6 % — SIGNIFICANT CHANGE UP (ref 2–14)
NEUTROPHILS # BLD AUTO: 5.8 K/UL — SIGNIFICANT CHANGE UP (ref 1.8–7.4)
NEUTROPHILS NFR BLD AUTO: 82.4 % — HIGH (ref 43–77)
PLATELET # BLD AUTO: 145 K/UL — LOW (ref 150–400)
POTASSIUM SERPL-MCNC: 4.4 MMOL/L — SIGNIFICANT CHANGE UP (ref 3.5–5.3)
POTASSIUM SERPL-SCNC: 4.4 MMOL/L — SIGNIFICANT CHANGE UP (ref 3.5–5.3)
RBC # BLD: 4.33 M/UL — SIGNIFICANT CHANGE UP (ref 4.2–5.8)
RBC # FLD: 13.7 % — SIGNIFICANT CHANGE UP (ref 10.3–14.5)
SODIUM SERPL-SCNC: 138 MMOL/L — SIGNIFICANT CHANGE UP (ref 135–145)
WBC # BLD: 7.03 K/UL — SIGNIFICANT CHANGE UP (ref 3.8–10.5)
WBC # FLD AUTO: 7.03 K/UL — SIGNIFICANT CHANGE UP (ref 3.8–10.5)

## 2019-08-28 PROCEDURE — 80048 BASIC METABOLIC PNL TOTAL CA: CPT

## 2019-08-28 PROCEDURE — 52601 PROSTATECTOMY (TURP): CPT

## 2019-08-28 PROCEDURE — 36415 COLL VENOUS BLD VENIPUNCTURE: CPT

## 2019-08-28 PROCEDURE — 88305 TISSUE EXAM BY PATHOLOGIST: CPT | Mod: 26

## 2019-08-28 PROCEDURE — 88305 TISSUE EXAM BY PATHOLOGIST: CPT

## 2019-08-28 PROCEDURE — 85025 COMPLETE CBC W/AUTO DIFF WBC: CPT

## 2019-08-28 RX ORDER — LEVOTHYROXINE SODIUM 125 MCG
50 TABLET ORAL
Refills: 0 | Status: DISCONTINUED | OUTPATIENT
Start: 2019-08-28 | End: 2019-08-29

## 2019-08-28 RX ORDER — MEPERIDINE HYDROCHLORIDE 50 MG/ML
12.5 INJECTION INTRAMUSCULAR; INTRAVENOUS; SUBCUTANEOUS
Refills: 0 | Status: DISCONTINUED | OUTPATIENT
Start: 2019-08-28 | End: 2019-08-28

## 2019-08-28 RX ORDER — ONDANSETRON 8 MG/1
4 TABLET, FILM COATED ORAL EVERY 6 HOURS
Refills: 0 | Status: DISCONTINUED | OUTPATIENT
Start: 2019-08-28 | End: 2019-08-29

## 2019-08-28 RX ORDER — FAMOTIDINE 10 MG/ML
20 INJECTION INTRAVENOUS ONCE
Refills: 0 | Status: COMPLETED | OUTPATIENT
Start: 2019-08-28 | End: 2019-08-28

## 2019-08-28 RX ORDER — ATROPA BELLADONNA AND OPIUM 16.2; 6 MG/1; MG/1
1 SUPPOSITORY RECTAL EVERY 12 HOURS
Refills: 0 | Status: DISCONTINUED | OUTPATIENT
Start: 2019-08-28 | End: 2019-08-29

## 2019-08-28 RX ORDER — OXYCODONE AND ACETAMINOPHEN 5; 325 MG/1; MG/1
1 TABLET ORAL EVERY 4 HOURS
Refills: 0 | Status: DISCONTINUED | OUTPATIENT
Start: 2019-08-28 | End: 2019-08-29

## 2019-08-28 RX ORDER — BUPROPION HYDROCHLORIDE 150 MG/1
300 TABLET, EXTENDED RELEASE ORAL DAILY
Refills: 0 | Status: DISCONTINUED | OUTPATIENT
Start: 2019-08-28 | End: 2019-08-29

## 2019-08-28 RX ORDER — PANTOPRAZOLE SODIUM 20 MG/1
40 TABLET, DELAYED RELEASE ORAL
Refills: 0 | Status: DISCONTINUED | OUTPATIENT
Start: 2019-08-28 | End: 2019-08-29

## 2019-08-28 RX ORDER — ONDANSETRON 8 MG/1
4 TABLET, FILM COATED ORAL ONCE
Refills: 0 | Status: DISCONTINUED | OUTPATIENT
Start: 2019-08-28 | End: 2019-08-28

## 2019-08-28 RX ORDER — OXYBUTYNIN CHLORIDE 5 MG
5 TABLET ORAL EVERY 8 HOURS
Refills: 0 | Status: DISCONTINUED | OUTPATIENT
Start: 2019-08-28 | End: 2019-08-29

## 2019-08-28 RX ORDER — SODIUM CHLORIDE 9 MG/ML
1000 INJECTION INTRAMUSCULAR; INTRAVENOUS; SUBCUTANEOUS
Refills: 0 | Status: DISCONTINUED | OUTPATIENT
Start: 2019-08-28 | End: 2019-08-29

## 2019-08-28 RX ORDER — CELECOXIB 200 MG/1
200 CAPSULE ORAL ONCE
Refills: 0 | Status: COMPLETED | OUTPATIENT
Start: 2019-08-28 | End: 2019-08-28

## 2019-08-28 RX ORDER — CLONAZEPAM 1 MG
0.5 TABLET ORAL AT BEDTIME
Refills: 0 | Status: DISCONTINUED | OUTPATIENT
Start: 2019-08-28 | End: 2019-08-29

## 2019-08-28 RX ORDER — PHENAZOPYRIDINE HCL 100 MG
200 TABLET ORAL ONCE
Refills: 0 | Status: COMPLETED | OUTPATIENT
Start: 2019-08-28 | End: 2019-08-28

## 2019-08-28 RX ORDER — POLYETHYLENE GLYCOL 3350 17 G/17G
17 POWDER, FOR SOLUTION ORAL DAILY
Refills: 0 | Status: DISCONTINUED | OUTPATIENT
Start: 2019-08-28 | End: 2019-08-29

## 2019-08-28 RX ORDER — PHENAZOPYRIDINE HCL 100 MG
200 TABLET ORAL EVERY 8 HOURS
Refills: 0 | Status: DISCONTINUED | OUTPATIENT
Start: 2019-08-28 | End: 2019-08-29

## 2019-08-28 RX ORDER — OXYBUTYNIN CHLORIDE 5 MG
5 TABLET ORAL ONCE
Refills: 0 | Status: COMPLETED | OUTPATIENT
Start: 2019-08-28 | End: 2019-08-28

## 2019-08-28 RX ORDER — OXYCODONE HYDROCHLORIDE 5 MG/1
5 TABLET ORAL ONCE
Refills: 0 | Status: DISCONTINUED | OUTPATIENT
Start: 2019-08-28 | End: 2019-08-28

## 2019-08-28 RX ORDER — CHOLECALCIFEROL (VITAMIN D3) 125 MCG
2000 CAPSULE ORAL DAILY
Refills: 0 | Status: DISCONTINUED | OUTPATIENT
Start: 2019-08-28 | End: 2019-08-29

## 2019-08-28 RX ORDER — ACETAMINOPHEN 500 MG
650 TABLET ORAL EVERY 6 HOURS
Refills: 0 | Status: DISCONTINUED | OUTPATIENT
Start: 2019-08-28 | End: 2019-08-29

## 2019-08-28 RX ORDER — ACETAMINOPHEN 500 MG
1000 TABLET ORAL ONCE
Refills: 0 | Status: DISCONTINUED | OUTPATIENT
Start: 2019-08-28 | End: 2019-08-28

## 2019-08-28 RX ORDER — HYDROMORPHONE HYDROCHLORIDE 2 MG/ML
0.5 INJECTION INTRAMUSCULAR; INTRAVENOUS; SUBCUTANEOUS
Refills: 0 | Status: DISCONTINUED | OUTPATIENT
Start: 2019-08-28 | End: 2019-08-28

## 2019-08-28 RX ORDER — LEVOTHYROXINE SODIUM 125 MCG
25 TABLET ORAL
Refills: 0 | Status: DISCONTINUED | OUTPATIENT
Start: 2019-08-28 | End: 2019-08-29

## 2019-08-28 RX ORDER — SODIUM CHLORIDE 9 MG/ML
1000 INJECTION INTRAMUSCULAR; INTRAVENOUS; SUBCUTANEOUS
Refills: 0 | Status: DISCONTINUED | OUTPATIENT
Start: 2019-08-28 | End: 2019-08-28

## 2019-08-28 RX ADMIN — FAMOTIDINE 20 MILLIGRAM(S): 10 INJECTION INTRAVENOUS at 10:34

## 2019-08-28 RX ADMIN — Medication 200 MILLIGRAM(S): at 12:48

## 2019-08-28 RX ADMIN — CELECOXIB 200 MILLIGRAM(S): 200 CAPSULE ORAL at 10:34

## 2019-08-28 RX ADMIN — Medication 0.5 MILLIGRAM(S): at 22:24

## 2019-08-28 RX ADMIN — CELECOXIB 200 MILLIGRAM(S): 200 CAPSULE ORAL at 10:35

## 2019-08-28 RX ADMIN — HYDROMORPHONE HYDROCHLORIDE 0.5 MILLIGRAM(S): 2 INJECTION INTRAMUSCULAR; INTRAVENOUS; SUBCUTANEOUS at 12:53

## 2019-08-28 RX ADMIN — OXYCODONE HYDROCHLORIDE 5 MILLIGRAM(S): 5 TABLET ORAL at 12:48

## 2019-08-28 RX ADMIN — Medication 5 MILLIGRAM(S): at 12:47

## 2019-08-28 RX ADMIN — SODIUM CHLORIDE 100 MILLILITER(S): 9 INJECTION INTRAMUSCULAR; INTRAVENOUS; SUBCUTANEOUS at 17:59

## 2019-08-28 NOTE — ASU PATIENT PROFILE, ADULT - PMH
Anxiety disorder    Back pain    BPH (benign prostatic hyperplasia)    Bundle branch block    Cataract    Constipation    Depression    Frozen shoulder  left  GERD (gastroesophageal reflux disease)    Hiatal hernia    Hypothyroid    Lumbar disc herniation    Lumbar radiculopathy    Mitral stenosis    Renal cyst  left  and right

## 2019-08-28 NOTE — ASU PREOP CHECKLIST - SITE MARKED BY ANESTHESIOLOGIST
[facility]  [address] [facility] [address]  [phone]  Dept: [phone]    PRE-OP EVALUATION:  Today's date: 2017    Radha Beckwith (: 1972) presents for pre-operative evaluation assessment as requested by Dr. Chandler.  She requires evaluation and anesthesia risk assessment prior to undergoing surgery/procedure for treatment of cervical dysplasia.  Proposed procedure: colposcopy, check uterus and ultrasound under sedation    Date of Surgery/ Procedure: 8/10/17  Time of Surgery/ Procedure: 1 pm  Hospital/Surgical Facility: [facility]  Fax number for surgical facility:   Primary Physician: Kassandra Bergmanman  Type of Anesthesia Anticipated: to be determined    Patient has a Health Care Directive or Living Will:  NO    Preop Questions 2017   1.  Do you have a history of heart attack, stroke, stent, bypass or surgery on an artery in the head, neck, heart or legs? No   2.  Do you ever have any pain or discomfort in your chest? No   3.  Do you have a history of  Heart Failure? No   4.   Are you troubled by shortness of breath when:  walking on a level surface, or up a slight hill, or at night? No   5.  Do you currently have a cold, bronchitis or other respiratory infection? No   6.  Do you have a cough, shortness of breath, or wheezing? No   7.  Do you sometimes get pains in the calves of your legs when you walk? No   8. Do you or anyone in your family have previous history of blood clots? No   9.  Do you or does anyone in your family have a serious bleeding problem such as prolonged bleeding following surgeries or cuts? No   10. Have you ever had problems with anemia or been told to take iron pills? No   11. Have you had any abnormal blood loss such as black, tarry or bloody stools, or abnormal vaginal bleeding? No   12. Have you ever had a blood transfusion? No   13. Have you or any of your relatives ever had problems with anesthesia? No   14. Do you  have sleep apnea, excessive snoring or daytime drowsiness? No   15. Do you have any prosthetic heart valves? No   16. Do you have prosthetic joints? No   17. Is there any chance that you may be pregnant? No           HPI:                                                      Brief HPI related to upcoming procedure:     Pt had abnormal pap on recent screening.  Needs colposcopy, but is deathly afraid of GYN exams.  MD agreed to do procedure under sedation in OR.  Her for perioperative evaluation      See problem list for active medical problems.  Problems all longstanding and stable, except as noted/documented.  See ROS for pertinent symptoms related to these conditions.                                                                                                  .  DEPRESSION - Patient has a long history of Depression of moderate severity requiring medication for control with recent symptoms being stable..Current symptoms of depression include none.                                                                                                                                                                                    .                                                                                                                                   .    MEDICAL HISTORY:                                                    Patient Active Problem List    Diagnosis Date Noted     Papanicolaou smear of cervix with low grade squamous intraepithelial lesion (LGSIL) 07/07/2017     Priority: Medium     7/7/17 LSIL pap/+ HR HPV (not 16 or 18). Plan: colp bef 10/7/17.        Opioid dependence in remission (H) 08/02/2015     Priority: Medium     On suboxone treatement with Garland Rodriguez MD, [place].  (Noted in 2015).  Has been off suboxone since at least June of 2017. 8/9/2017        Gastric ulcer 07/31/2015     Priority: Medium     Anxiety attack 07/31/2015     Priority: Medium     Hypokalemia 06/23/2015     Priority:  Medium     Atypical chest pain 06/23/2015     Priority: Medium     Tobacco abuse 06/23/2015     Priority: Medium     CARDIOVASCULAR SCREENING; LDL GOAL LESS THAN 160 01/29/2014     Priority: Medium     Ingrowing nail 01/09/2014     Priority: Medium     Anxiety 08/19/2013     Priority: Medium     GERD (gastroesophageal reflux disease) 07/28/2010     Priority: Medium     Takes omeprazole and metoclopramide       Restless legs 07/28/2010     Priority: Medium      Past Medical History:   Diagnosis Date     Abdominal pain, right lower quadrant 03/09/08    Admit. Discharged 03/10/08     Anxiety attack 7/31/2015     Dehydration      Gastric ulcer 7/31/2015     GERD (gastroesophageal reflux disease) 7/28/2010    Takes omeprazole and metoclopramide      Opioid dependence in remission (H) 8/2/2015     Other and unspecified ovarian cyst      Papanicolaou smear of cervix with low grade squamous intraepithelial lesion (LGSIL) 07/07/2017     Past Surgical History:   Procedure Laterality Date     ESOPHAGOSCOPY, GASTROSCOPY, DUODENOSCOPY (EGD), COMBINED N/A 4/17/2017    Procedure: COMBINED ESOPHAGOSCOPY, GASTROSCOPY, DUODENOSCOPY (EGD);  Surgeon: Ibrahima Esposito MD;  Location: [facility] UGI ENDOSCOPY, SIMPLE EXAM  01/07/08     Current Outpatient Prescriptions   Medication Sig Dispense Refill     LORazepam (ATIVAN) 1 MG tablet Take 0.5 tablets (0.5 mg) by mouth every 8 hours as needed for anxiety 20 tablet 0     cetirizine (ZYRTEC) 10 MG tablet Take 1 tablet (10 mg) by mouth every evening 30 tablet 3     medroxyPROGESTERone (DEPO-PROVERA) 150 MG/ML injection Inject 1 mL (150 mg) into the muscle every 3 months 3 mL 3     sertraline (ZOLOFT) 100 MG tablet Take 1 tablet (100 mg) by mouth daily 90 tablet 1     order for DME Blood pressure cuff 1 each 0     omeprazole 20 MG tablet Take 1 tablet (20 mg) by mouth 2 times daily Take 30-60 minutes before a meal. 60 tablet 3     ondansetron (ZOFRAN) 4 MG tablet TAKE ONE TABLET BY MOUTH  "EVERY 6 HOURS AS NEEDED FOR NAUSEA OR VOMITING 18 tablet 10     sucralfate (CARAFATE) 1 GM tablet Take 1 tablet (1 g) by mouth 4 times daily 120 tablet 1     QUEtiapine (SEROQUEL) 50 MG tablet Take 1-2 tablets ( mg) by mouth nightly as needed 60 tablet 3     OTC products: tylenol, potassium    Allergies   Allergen Reactions     Cafergot      12-            GI problems-     Compazine      Droperidol      Nubain [Nalbuphine Hcl]      Seasonal Allergies      Sumatriptan      vomits after giving herself a shot     Prochlorperazine Palpitations     Uncontrolled movement      Latex Allergy: NO    Social History   Substance Use Topics     Smoking status: Current Every Day Smoker     Packs/day: 0.25     Types: Cigarettes     Smokeless tobacco: Never Used      Comment: 5-6 cigs daily     Alcohol use Yes      Comment: occasional drinks     History   Drug Use No       REVIEW OF SYSTEMS:                                                    C: NEGATIVE for fever, chills, change in weight  I: NEGATIVE for worrisome rashes, moles or lesions  E: NEGATIVE for vision changes or irritation  E/M: NEGATIVE for ear, mouth and throat problems  R: NEGATIVE for significant cough or SOB  B: NEGATIVE for masses, tenderness or discharge  CV: NEGATIVE for chest pain, palpitations or peripheral edema  GI: NEGATIVE for nausea, abdominal pain, heartburn, or change in bowel habits  : NEGATIVE for frequency, dysuria, or hematuria  M: NEGATIVE for significant arthralgias or myalgia  N: NEGATIVE for weakness, dizziness or paresthesias  H: NEGATIVE for bleeding problems  P: NEGATIVE for changes in mood or affect    EXAM:                                                    /78 (BP Location: Left arm, Patient Position: Chair, Cuff Size: Adult Regular)  Pulse 108  Temp 98.5  F (36.9  C) (Temporal)  Resp 16  Ht 5' 5\" (1.651 m)  Wt 151 lb 12.8 oz (68.9 kg)  BMI 25.26 kg/m2    GENERAL APPEARANCE: healthy, alert and no distress     " EYES: EOMI, PERRL     HENT: ear canals and TM's normal and nose and mouth without ulcers or lesions     NECK: no adenopathy, no asymmetry, masses, or scars and thyroid normal to palpation     RESP: lungs clear to auscultation - no rales, rhonchi or wheezes     CV: regular rates and rhythm, normal S1 S2, no S3 or S4 and no murmur, click or rub     ABDOMEN:  soft, nontender, no HSM or masses and bowel sounds normal     MS: extremities normal- no gross deformities noted, no evidence of inflammation in joints, FROM in all extremities.     SKIN: no suspicious lesions or rashes     NEURO: Normal strength and tone, sensory exam grossly normal, mentation intact and speech normal     PSYCH: mentation appears normal. and affect normal/bright    DIAGNOSTICS:                                                    EKG: Not indicated due to non-vascular surgery and low risk of event (age <65 and without cardiac risk factors)    Recent Labs   Lab Test  03/24/17   0803  10/02/15   0823   HGB  14.5  14.4   PLT  226  207   INR  0.87   --    NA  144  135   POTASSIUM  3.8  3.9   CR  0.68  0.60        IMPRESSION:                                                    Reason for surgery/procedure: cervical dysplasia on pap  Diagnosis/reason for consult: perioperative risk evaluation for sedation    The proposed surgical procedure is considered LOW risk.    REVISED CARDIAC RISK INDEX  The patient has the following serious cardiovascular risks for perioperative complications such as (MI, PE, VFib and 3  AV Block):  No serious cardiac risks  INTERPRETATION: 0 risks: Class I (very low risk - 0.4% complication rate)    The patient has the following additional risks for perioperative complications:  High tolerance to opioid analgesics due to pas history      ICD-10-CM    1. Preop general physical exam Z01.818 Lipid Profile (Chol, Trig, HDL, LDL calc)   2. Papanicolaou smear of cervix with low grade squamous intraepithelial lesion (LGSIL) R87.612    3.  Opioid dependence in remission (H) F11.21    4. Gastroesophageal reflux disease, esophagitis presence not specified K21.9    5. Anxiety F41.9    6. Restless legs G25.81    7. Tobacco abuse Z72.0    8. Hypokalemia E87.6    9. CARDIOVASCULAR SCREENING; LDL GOAL LESS THAN 100 Z13.6 Lipid Profile (Chol, Trig, HDL, LDL calc)       RECOMMENDATIONS:                                                          --Patient is to take all scheduled medications on the day of surgery EXCEPT for modifications listed below.    APPROVAL GIVEN to proceed with proposed procedure, without further diagnostic evaluation       Signed Electronically by: Clemente Chino MD, MD    Copy of this evaluation report is provided to requesting physician.    [facility] Preop Guidelines   n/a

## 2019-08-28 NOTE — BRIEF OPERATIVE NOTE - OPERATION/FINDINGS
tight bladder neck, obstructing lateral lobes. Clear open channel from veru to bladder neck after resection

## 2019-08-29 VITALS
OXYGEN SATURATION: 97 % | DIASTOLIC BLOOD PRESSURE: 46 MMHG | SYSTOLIC BLOOD PRESSURE: 96 MMHG | RESPIRATION RATE: 18 BRPM | HEART RATE: 76 BPM | TEMPERATURE: 98 F

## 2019-08-29 RX ORDER — CIPROFLOXACIN LACTATE 400MG/40ML
1 VIAL (ML) INTRAVENOUS
Qty: 10 | Refills: 0
Start: 2019-08-29 | End: 2019-09-02

## 2019-08-29 RX ORDER — OXYBUTYNIN CHLORIDE 5 MG
1 TABLET ORAL
Qty: 12 | Refills: 0
Start: 2019-08-29 | End: 2019-09-01

## 2019-08-29 RX ORDER — PHENAZOPYRIDINE HCL 100 MG
1 TABLET ORAL
Qty: 9 | Refills: 0
Start: 2019-08-29 | End: 2019-08-31

## 2019-08-29 RX ADMIN — Medication 25 MICROGRAM(S): at 06:29

## 2019-08-29 RX ADMIN — SODIUM CHLORIDE 100 MILLILITER(S): 9 INJECTION INTRAMUSCULAR; INTRAVENOUS; SUBCUTANEOUS at 03:53

## 2019-08-29 RX ADMIN — PANTOPRAZOLE SODIUM 40 MILLIGRAM(S): 20 TABLET, DELAYED RELEASE ORAL at 06:29

## 2019-08-29 NOTE — PROGRESS NOTE ADULT - SUBJECTIVE AND OBJECTIVE BOX
Pt seen and examined at bedside. No acute events post op, no new complaints. CBI ran clear on minimal drip. Some hematuria with ambulation. NO requirement for manual irrigation.   no fevers, no chils, no SOB, no CP, no abd pain, no nausea, no vomiting. Tolerating diet.     Vital Signs Last 24 Hrs  T(C): 36.8 (29 Aug 2019 05:05), Max: 37.1 (28 Aug 2019 20:27)  T(F): 98.3 (29 Aug 2019 05:05), Max: 98.7 (28 Aug 2019 20:27)  HR: 76 (29 Aug 2019 05:05) (62 - 76)  BP: 96/46 (29 Aug 2019 05:05) (96/46 - 122/75)  BP(mean): --  RR: 18 (29 Aug 2019 05:05) (14 - 18)  SpO2: 97% (29 Aug 2019 05:05) (96% - 100%)    NAD, A+Ox3  RRR  no increased WOB  abd s nt nd, no SPT  CBI draining clear yellow urine with CBI minimal drip                          13.5   7.03  )-----------( 145      ( 28 Aug 2019 13:40 )             40.4     08-28    138  |  109<H>  |  14  ----------------------------<  95  4.4   |  25  |  1.17    Ca    8.8      28 Aug 2019 13:40

## 2019-08-29 NOTE — ASU DISCHARGE PLAN (ADULT/PEDIATRIC) - CARE PROVIDER_API CALL
Marco Baptiste)  Urology  [address] [facility], [address] [place] [address]  Phone: [phone]  Fax: [phone]  Follow Up Time: 1 week

## 2019-09-03 ENCOUNTER — APPOINTMENT (OUTPATIENT)
Age: 69
End: 2019-09-03
Payer: MEDICARE

## 2019-09-03 VITALS — DIASTOLIC BLOOD PRESSURE: 73 MMHG | HEART RATE: 87 BPM | SYSTOLIC BLOOD PRESSURE: 103 MMHG | OXYGEN SATURATION: 97 %

## 2019-09-03 DIAGNOSIS — I45.4 NONSPECIFIC INTRAVENTRICULAR BLOCK: ICD-10-CM

## 2019-09-03 DIAGNOSIS — Z98.1 ARTHRODESIS STATUS: ICD-10-CM

## 2019-09-03 DIAGNOSIS — N40.1 BENIGN PROSTATIC HYPERPLASIA WITH LOWER URINARY TRACT SYMPTOMS: ICD-10-CM

## 2019-09-03 DIAGNOSIS — F41.9 ANXIETY DISORDER, UNSPECIFIED: ICD-10-CM

## 2019-09-03 DIAGNOSIS — E03.9 HYPOTHYROIDISM, UNSPECIFIED: ICD-10-CM

## 2019-09-03 DIAGNOSIS — M51.16 INTERVERTEBRAL DISC DISORDERS WITH RADICULOPATHY, LUMBAR REGION: ICD-10-CM

## 2019-09-03 DIAGNOSIS — K21.9 GASTRO-ESOPHAGEAL REFLUX DISEASE WITHOUT ESOPHAGITIS: ICD-10-CM

## 2019-09-03 DIAGNOSIS — N41.1 CHRONIC PROSTATITIS: ICD-10-CM

## 2019-09-03 DIAGNOSIS — N13.8 OTHER OBSTRUCTIVE AND REFLUX UROPATHY: ICD-10-CM

## 2019-09-03 DIAGNOSIS — F32.9 MAJOR DEPRESSIVE DISORDER, SINGLE EPISODE, UNSPECIFIED: ICD-10-CM

## 2019-09-03 DIAGNOSIS — Z83.3 FAMILY HISTORY OF DIABETES MELLITUS: ICD-10-CM

## 2019-09-03 DIAGNOSIS — N28.1 CYST OF KIDNEY, ACQUIRED: ICD-10-CM

## 2019-09-03 PROBLEM — M75.00 ADHESIVE CAPSULITIS OF UNSPECIFIED SHOULDER: Chronic | Status: ACTIVE | Noted: 2019-08-20

## 2019-09-03 PROCEDURE — 99024 POSTOP FOLLOW-UP VISIT: CPT

## 2019-09-03 NOTE — HISTORY OF PRESENT ILLNESS
[FreeTextEntry1] : 67 yo M presents for follow up. He has history of BPH with LUTS. He poorly tolerated alpha blockers and  anticholinergics and is interested in TURP procedure. He also reports ED continues, with good rigidity but very poor duration. No improvement with sildenafil in the past. All symptoms have been ongoing for years. Nothing makes them better, nothing makes them worse. They are associated with nothing. \par \par 09/03/2019: Patient presents for follow up. He is s/p TURP and is here for TOV. He had penile pain with liz, improved with lubrication to urethra. Mild urgency.  No incontinence. No fevers, no chills, no nausea, no vomiting, no flank pain. \par \par Bladder was filled via Liz catheter by instilling 120 mL of sterile water. Liz catheter was then removed.  Pt then voided 100  mL. PVR 0  mL.

## 2019-09-03 NOTE — PHYSICAL EXAM

## 2019-09-18 ENCOUNTER — OUTPATIENT (OUTPATIENT)
Dept: OUTPATIENT SERVICES | Facility: HOSPITAL | Age: 69
LOS: 1 days | End: 2019-09-18
Payer: MEDICARE

## 2019-09-18 ENCOUNTER — APPOINTMENT (OUTPATIENT)
Dept: MRI IMAGING | Facility: CLINIC | Age: 69
End: 2019-09-18
Payer: MEDICARE

## 2019-09-18 DIAGNOSIS — Z98.890 OTHER SPECIFIED POSTPROCEDURAL STATES: Chronic | ICD-10-CM

## 2019-09-18 DIAGNOSIS — Z41.9 ENCOUNTER FOR PROCEDURE FOR PURPOSES OTHER THAN REMEDYING HEALTH STATE, UNSPECIFIED: Chronic | ICD-10-CM

## 2019-09-18 DIAGNOSIS — Z98.1 ARTHRODESIS STATUS: Chronic | ICD-10-CM

## 2019-09-18 DIAGNOSIS — Z00.8 ENCOUNTER FOR OTHER GENERAL EXAMINATION: ICD-10-CM

## 2019-09-18 PROCEDURE — 70551 MRI BRAIN STEM W/O DYE: CPT

## 2019-09-18 PROCEDURE — 70551 MRI BRAIN STEM W/O DYE: CPT | Mod: 26

## 2019-10-01 ENCOUNTER — APPOINTMENT (OUTPATIENT)
Dept: OTOLARYNGOLOGY | Facility: CLINIC | Age: 69
End: 2019-10-01
Payer: MEDICARE

## 2019-10-01 VITALS
HEART RATE: 65 BPM | DIASTOLIC BLOOD PRESSURE: 68 MMHG | HEIGHT: 73 IN | SYSTOLIC BLOOD PRESSURE: 110 MMHG | WEIGHT: 180 LBS | BODY MASS INDEX: 23.86 KG/M2

## 2019-10-01 DIAGNOSIS — G52.1 DISORDERS OF GLOSSOPHARYNGEAL NERVE: ICD-10-CM

## 2019-10-01 PROCEDURE — 31575 DIAGNOSTIC LARYNGOSCOPY: CPT

## 2019-10-01 PROCEDURE — 99204 OFFICE O/P NEW MOD 45 MIN: CPT | Mod: 25

## 2019-10-01 NOTE — ASSESSMENT
[FreeTextEntry1] : reviewed ct chest abdomen\par persistent cough awakening pt at night\par no sign laryngeal reflux\par ?pnd\par hycodan prn\par musinex\par hydration\par trial fluticasone spray\par to have pulmonary eval re hiccup and cough

## 2019-10-01 NOTE — HISTORY OF PRESENT ILLNESS
[de-identified] : several mo hx hiccup daily one at a time of after eating\par 3 x day started after lumbar fusion\par had upper endoscopy reported neg\par chest and abdominal ct 8/19 rt long lower lobe\par persistent cough nonproductive dry  x 2 years\par pnd and throat clearing, neg hx sinusitis, mild seasonal allergies\par neg hx asthma\par using bedroom humidifier\par hx gerd and hiatal herinia daily protonix

## 2019-10-01 NOTE — REVIEW OF SYSTEMS
[Post Nasal Drip] : post nasal drip [Problem Snoring] : problem snoring [Throat Clearing] : throat clearing [Throat Dryness] : throat dryness [Throat Itching] : throat itching [Cough] : cough [Heartburn] : heartburn [Joint Pain] : joint pain [Itching] : itching [Anxiety] : anxiety [Depression] : depression [Negative] : Heme/Lymph [Patient Intake Form Reviewed] : Patient intake form was reviewed [FreeTextEntry1] : difficulty swallowing,

## 2019-10-01 NOTE — CONSULT LETTER
[FreeTextEntry2] : rigoberto perry md [FreeTextEntry1] : Dear Dr. GABINO MONROY,\par \par Thank you for your kind referral. Please refer to my enclosed office notes for GABINO MARTINEZ . If there are any questions free to contact me.\par

## 2019-10-01 NOTE — PROCEDURE
[de-identified] : Indication for procedure:Unable to examine laryngeal structures with mirror exam\par Scope # \par Topical anesthesia with viscous xylocaine 2% is applied to the anterior nares.\par A flexible fiberoptic laryngoscope is than introduced through the nares.\par The nasopharynx is clear without mass or inflammation.\par The posterior pharyngeal wall is unremarkable.\par The tongue base and vallecula are unremarkable.  The hypopharynx is unremarkable and unobstructed.\par The supraglottic larynx is within normal limits.\par Both vocal cords are fully mobile with no nodule, polyp or other lesion present.\par There is no edema or erythema overlying the arytenoid cartilages or the inter-arytenoid space.\par The subglottic space is clear.\par The voice has a normal quality.\par

## 2019-10-02 ENCOUNTER — APPOINTMENT (OUTPATIENT)
Dept: INTERNAL MEDICINE | Facility: CLINIC | Age: 69
End: 2019-10-02
Payer: MEDICARE

## 2019-10-02 VITALS
HEART RATE: 78 BPM | RESPIRATION RATE: 18 BRPM | TEMPERATURE: 98.2 F | BODY MASS INDEX: 23.86 KG/M2 | OXYGEN SATURATION: 97 % | DIASTOLIC BLOOD PRESSURE: 60 MMHG | WEIGHT: 180 LBS | HEIGHT: 73 IN | SYSTOLIC BLOOD PRESSURE: 106 MMHG

## 2019-10-02 PROCEDURE — ZZZZZ: CPT

## 2019-10-02 PROCEDURE — 94727 GAS DIL/WSHOT DETER LNG VOL: CPT

## 2019-10-02 PROCEDURE — 99205 OFFICE O/P NEW HI 60 MIN: CPT | Mod: 25

## 2019-10-02 PROCEDURE — 94060 EVALUATION OF WHEEZING: CPT

## 2019-10-02 PROCEDURE — 94729 DIFFUSING CAPACITY: CPT

## 2019-10-02 NOTE — PLAN
[FreeTextEntry1] : 1. Continue medications outlined above.\par \par 2. Repeat chest CT in February to re-evaluate ground glass opacities. They most likely represent inflammatory/infectious etiologies. However, an early adenocarcinoma in situ cannot be entirely ruled out. This will need to be monitored carefully\par \par 3. He will have his flu shot by the end of the month and Pneumovax 23 and Shingrex afterwards with his PCP.\par \par 4. Follow up with myself in 4 months for pre-post spirometry. We will review the followup CAT scan of the chest at that time to reassess the groundglass infiltrates.\par \par 5. Follow up with PCP, Dr. Baptiste.

## 2019-10-02 NOTE — REVIEW OF SYSTEMS
[Fatigue] : fatigue [Cough] : cough [Heartburn] : heartburn [Hesitancy] : hesitancy [Nocturia] : nocturia [Joint Pain] : joint pain [Back Pain] : back pain [Negative] : Heme/Lymph [Postnasal Drip] : postnasal drip [FreeTextEntry6] : see history of present illness [FreeTextEntry7] : Hemorrhoids, trouble swallowing [FreeTextEntry8] : ED, trouble urinating

## 2019-10-02 NOTE — ADDENDUM
[FreeTextEntry1] : I, Clif Bowers, acted solely as a scribe for Dr. Gambino on this date 10/02/2019.

## 2019-10-02 NOTE — HEALTH RISK ASSESSMENT
[1 or 2 (0 pts)] : 1 or 2 (0 points) [No] : In the past 12 months have you used drugs other than those required for medical reasons? No [Never (0 pts)] : Never (0 points) [No falls in past year] : Patient reported no falls in the past year [0] : 2) Feeling down, depressed, or hopeless: Not at all (0) [No Retinopathy] : No retinopathy [Patient declined mammogram] : Patient declined mammogram [Patient declined PAP Smear] : Patient declined PAP Smear [Patient declined bone density test] : Patient declined bone density test [Patient reported colonoscopy was normal] : Patient reported colonoscopy was normal [HIV test declined] : HIV test declined [Hepatitis C test declined] : Hepatitis C test declined [None] : None [With Significant Other] : lives with significant other [Employed] : employed [] :  [Sexually Active] : sexually active [Feels Safe at Home] : Feels safe at home [Fully functional (bathing, dressing, toileting, transferring, walking, feeding)] : Fully functional (bathing, dressing, toileting, transferring, walking, feeding) [Fully functional (using the telephone, shopping, preparing meals, housekeeping, doing laundry, using] : Fully functional and needs no help or supervision to perform IADLs (using the telephone, shopping, preparing meals, housekeeping, doing laundry, using transportation, managing medications and managing finances) [Reports normal functional visual acuity (ie: able to read med bottle)] : Reports normal functional visual acuity [Smoke Detector] : smoke detector [Carbon Monoxide Detector] : carbon monoxide detector [Guns at Home] : guns at home [Seat Belt] :  uses seat belt [Sunscreen] : uses sunscreen [EyeExamDate] : 01/19 [Change in mental status noted] : No change in mental status noted [Language] : denies difficulty with language [Behavior] : denies difficulty with behavior [Learning/Retaining New Information] : denies difficulty learning/retaining new information [Handling Complex Tasks] : denies difficulty handling complex tasks [Spatial Ability and Orientation] : denies difficulty with spatial ability and orientation [Reasoning] : denies difficulty with reasoning [High Risk Behavior] : no high risk behavior [Reports changes in hearing] : Reports no changes in hearing [Reports changes in vision] : Reports no changes in vision [Reports changes in dental health] : Reports no changes in dental health [Travel to Developing Areas] : does not  travel to developing areas [TB Exposure] : is not being exposed to tuberculosis [Caregiver Concerns] : does not have caregiver concerns [ColonoscopyDate] : 01/2017 [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

## 2019-10-02 NOTE — PHYSICAL EXAM
[No Acute Distress] : no acute distress [Well Developed] : well developed [Well Nourished] : well nourished [Well-Appearing] : well-appearing [Normal Sclera/Conjunctiva] : normal sclera/conjunctiva [PERRL] : pupils equal round and reactive to light [EOMI] : extraocular movements intact [Normal Outer Ear/Nose] : the outer ears and nose were normal in appearance [Normal Oropharynx] : the oropharynx was normal [No JVD] : no jugular venous distention [Supple] : supple [Thyroid Normal, No Nodules] : the thyroid was normal and there were no nodules present [No Respiratory Distress] : no respiratory distress  [No Accessory Muscle Use] : no accessory muscle use [Clear to Auscultation] : lungs were clear to auscultation bilaterally [Normal Rate] : normal rate  [Regular Rhythm] : with a regular rhythm [Normal S1, S2] : normal S1 and S2 [No Murmur] : no murmur heard [No Edema] : there was no peripheral edema [Soft] : abdomen soft [No Extremity Clubbing/Cyanosis] : no extremity clubbing/cyanosis [Non-distended] : non-distended [Non Tender] : non-tender [No Masses] : no abdominal mass palpated [No HSM] : no HSM [Normal Bowel Sounds] : normal bowel sounds [Normal Posterior Cervical Nodes] : no posterior cervical lymphadenopathy [Normal Anterior Cervical Nodes] : no anterior cervical lymphadenopathy [No CVA Tenderness] : no CVA  tenderness [No Spinal Tenderness] : no spinal tenderness [Grossly Normal Strength/Tone] : grossly normal strength/tone [No Joint Swelling] : no joint swelling [No Rash] : no rash [Coordination Grossly Intact] : coordination grossly intact [Normal Gait] : normal gait [Deep Tendon Reflexes (DTR)] : deep tendon reflexes were 2+ and symmetric [Normal Affect] : the affect was normal [Normal Insight/Judgement] : insight and judgment were intact [de-identified] : Bilateral symmetric gynecomastia is present [de-identified] : There is a well-healed scar in the midline of the lumbosacral spine

## 2019-10-02 NOTE — HISTORY OF PRESENT ILLNESS
[de-identified] : The patient is a 69 year year-old white male who presents to my office for an initial evaluation of hiccups.  \par \par The patient reports that for the past several months he has been having episodes where he experiences a large hiccup. This hiccup is described as a “loud gasp” and it usually occurs after eating. These “one hiccup episodes” occur 4-5 times a day.  He states that the episodes started at around the time he had his lumbar fusion surgery. His PCP is Dr. Baptiste who referred to him to gastroenterologist Dr. Katz. Dr. Katz ordered a CAT scan of the chest and abdomen that showed ground glass opacities in the right lower lobe. He then saw neurologist Dr. Rodriguez who ordered an MRI of the brain. He also saw ENT Dr. Lee yesterday.\par \par The patient reports that he has been having issues with a nonproductive cough for at least 2 years. He states that during his coughing fits, he has difficulty swallowing with dry throat. The patient does not have a history of asthma but states that as a child he was diagnosed with a bronchial condition. He was hospitalized as a child for this once. He states that his symptoms of the bronchial condition are not associated with a cough. He denies recent new pets, dust exposure, or recently moving into a new house. He denies any associated wheezing or shortness of breath. Prior to his back issues, he used to exercise regularly but in the last year, he has not secondary to the back surgery. \par \par The patient has had allergy tests as a child which reported that he was allergic to cats, furry dogs, and dust. He has had allergy shots in the past, for a couple years when he was around 9 years old.  He states that when he is exposed to these allergens, his throat closes up, and he has itchy watery eyes. \par \par The patient has a history of hiatal hernia and GERD which is controlled by Protonix. He has a history of hypothyroidism that is well controlled with Levothyroxine.

## 2019-10-15 ENCOUNTER — APPOINTMENT (OUTPATIENT)
Age: 69
End: 2019-10-15
Payer: MEDICARE

## 2019-10-15 PROCEDURE — 99213 OFFICE O/P EST LOW 20 MIN: CPT | Mod: 24

## 2019-10-15 NOTE — ASSESSMENT
[FreeTextEntry1] : 70 yo M s/p TURP, now with urgency and terminal hesitancy. Discussed bladder training for urgency with "quick flicks" rhythmic pelvic floor contractions when he feels urgeny, as well as titrating up his fluid intake. 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 \par \par For complex renal cyst, recommend surveillance with Renal US. Recent CT showed calcifications and septations in 6.8 cm cyst, but no apparent enhancement though was PO/IV single phase CT.

## 2019-10-15 NOTE — PHYSICAL EXAM
[General Appearance - Well Developed] : well developed [General Appearance - Well Nourished] : well nourished [Normal Appearance] : normal appearance [Well Groomed] : well groomed [General Appearance - In No Acute Distress] : no acute distress [Abdomen Tenderness] : non-tender [Abdomen Soft] : soft [Costovertebral Angle Tenderness] : no ~M costovertebral angle tenderness [Skin Color & Pigmentation] : normal skin color and pigmentation [Urinary Bladder Findings] : the bladder was normal on palpation [] : no respiratory distress [Edema] : no peripheral edema [Exaggerated Use Of Accessory Muscles For Inspiration] : no accessory muscle use [Respiration, Rhythm And Depth] : normal respiratory rhythm and effort [Oriented To Time, Place, And Person] : oriented to person, place, and time [Affect] : the affect was normal [Mood] : the mood was normal [Not Anxious] : not anxious [Normal Station and Gait] : the gait and station were normal for the patient's age [No Focal Deficits] : no focal deficits [No Palpable Adenopathy] : no palpable adenopathy

## 2019-10-15 NOTE — REVIEW OF SYSTEMS
[Abdominal Pain] : abdominal pain [Dizziness] : dizziness [see HPI] : see HPI [Negative] : Heme/Lymph

## 2019-10-15 NOTE — HISTORY OF PRESENT ILLNESS
[FreeTextEntry1] : 69 yo M presents for follow up. He has history of BPH with LUTS. He poorly tolerated alpha blockers and  anticholinergics and is interested in TURP procedure. He also reports ED continues, with good rigidity but very poor duration. No improvement with sildenafil in the past. All symptoms have been ongoing for years. Nothing makes them better, nothing makes them worse. They are associated with nothing. \par \par 09/03/2019: Patient presents for follow up. He is s/p TURP and is here for TOV. He had penile pain with liz, improved with lubrication to urethra. Mild urgency.  No incontinence. No fevers, no chills, no nausea, no vomiting, no flank pain. Bladder was filled via Liz catheter by instilling 120 mL of sterile water. Liz catheter was then removed.  Pt then voided 100  mL. PVR 0  mL. \par \par 10/15/2019: Patient presents for follow up. He reports improvement of flow, frequency, and nocturia. Does report that at end of voiding, has hesitancy and has to force out urine. He also reports urgency. Has urge to void when he drinks 4 oz water. Scant few clots since last visit. No current hematuria, no dysuria, no frequency, no straining. No incontinence. No fevers, no chills, no nausea, no vomiting, no flank pain.

## 2019-10-16 ENCOUNTER — RX RENEWAL (OUTPATIENT)
Age: 69
End: 2019-10-16

## 2019-10-16 DIAGNOSIS — R09.81 NASAL CONGESTION: ICD-10-CM

## 2019-12-10 ENCOUNTER — APPOINTMENT (OUTPATIENT)
Dept: DERMATOLOGY | Facility: CLINIC | Age: 69
End: 2019-12-10
Payer: MEDICARE

## 2019-12-10 DIAGNOSIS — Z00.00 ENCOUNTER FOR GENERAL ADULT MEDICAL EXAMINATION W/OUT ABNORMAL FINDINGS: ICD-10-CM

## 2019-12-10 DIAGNOSIS — L29.9 PRURITUS, UNSPECIFIED: ICD-10-CM

## 2019-12-10 PROCEDURE — 99213 OFFICE O/P EST LOW 20 MIN: CPT

## 2019-12-10 RX ORDER — HYDROCODONE BITARTRATE AND HOMATROPINE METHYLBROMIDE 5; 1.5 MG/5ML; MG/5ML
5-1.5 SYRUP ORAL
Qty: 240 | Refills: 0 | Status: DISCONTINUED | COMMUNITY
Start: 2019-10-01 | End: 2019-12-10

## 2019-12-10 RX ORDER — CIPROFLOXACIN HYDROCHLORIDE 500 MG/1
500 TABLET, FILM COATED ORAL TWICE DAILY
Qty: 14 | Refills: 3 | Status: DISCONTINUED | COMMUNITY
Start: 2019-09-03 | End: 2019-12-10

## 2019-12-10 NOTE — PHYSICAL EXAM
[Alert] : alert [Well Nourished] : well nourished [Oriented x 3] : ~L oriented x 3 [Full Body Skin Exam Performed] : performed [FreeTextEntry3] : A full skin exam was performed including the scalp, face (including lips, ears, nose and eyes), neck, chest, abdomen, back, buttocks, upper extremities and lower extremities.  The patient declined examination of the genitalia.  \par The exam revealed the following benign growths:\par Cloud pigmented nevi.\par Seborrheic keratoses.\par Lentigines.\par Cherry angioma.\par

## 2019-12-10 NOTE — ASSESSMENT
[FreeTextEntry1] : A complete skin examination was performed.  There is no evidence of skin cancer.  We discussed the importance of photoprotection, including the use of hats, protective clothing and sunscreens with an SPF of at least 30.  Sun avoidance was also discussed.\par \par Pruritus of the low back, only helped by cordran tape.\par Will send for patient to use as needed.

## 2019-12-10 NOTE — HISTORY OF PRESENT ILLNESS
[FreeTextEntry1] : Patient presents for skin examination. [de-identified] : Denies new, changing, bleeding or tender lesions on the skin over the past year.\par

## 2019-12-11 ENCOUNTER — RX CHANGE (OUTPATIENT)
Age: 69
End: 2019-12-11

## 2020-01-01 NOTE — ASU PATIENT PROFILE, ADULT - PSH
Betsy Ramírez)  2020 22:42:28 Elective surgery  lumbar PEREZ x 2  H/O endoscopy  08/2019  H/O right inguinal hernia repair  2008  S/P colonoscopy    S/P lumbar spinal fusion  0/2018

## 2020-01-13 NOTE — ASU PATIENT PROFILE, ADULT - BLOOD TRANSFUSION, PREVIOUS, PROFILE
"Requested Prescriptions   Pending Prescriptions Disp Refills     lisinopril (PRINIVIL/ZESTRIL) 20 MG tablet [Pharmacy Med Name: LISINOPRIL  Last Written Prescription Date:  12/27/18  Last Fill Quantity: 90,  # refills: 3   Last office visit: 8/13/2019 with prescribing provider:  Kirill Hauser   Future Office Visit:     20 MG TABLET] 90 tablet 3     Sig: Take 1 Tablet BY MOUTH EVERY DAY       ACE Inhibitors (Including Combos) Protocol Failed - 1/11/2020  8:00 AM        Failed - Blood pressure under 140/90 in past 12 months     BP Readings from Last 3 Encounters:   12/27/18 120/84   11/13/17 132/86   03/16/17 120/86           Failed - Recent (12 mo) or future (30 days) visit within the authorizing provider's specialty     Patient has had an office visit with the authorizing provider or a provider within the authorizing providers department within the previous 12 mos or has a future within next 30 days. See \"Patient Info\" tab in inbasket, or \"Choose Columns\" in Meds & Orders section of the refill encounter.          Passed - Medication is active on med list        Passed - Patient is age 18 or older        Passed - Normal serum creatinine on file in past 12 months     Recent Labs   Lab Test 05/17/19  0640   CR 0.80           Passed - Normal serum potassium on file in past 12 months     Recent Labs   Lab Test 05/17/19  0640   POTASSIUM 3.8               "
Left message for patient to call back, CSS, please read Dr. Hauser's message to patient, thank you.    MONIQUE Main    
Message left for patient to check the pharmacy.  Needs office visit for further refills. Sonja COHEN RN    
Refilled #30.  Patient is due for clinic appointment for wellness exam and medication management. Please call patient to have him schedule this before February 2020.  
Routing refill request to provider for review/approval because:  Current office BP 12/2018  LOV with PCP 12/27/18    Routing to provider.    Dang AYON Rn          
no

## 2020-02-04 ENCOUNTER — FORM ENCOUNTER (OUTPATIENT)
Age: 70
End: 2020-02-04

## 2020-02-05 ENCOUNTER — APPOINTMENT (OUTPATIENT)
Dept: CT IMAGING | Facility: CLINIC | Age: 70
End: 2020-02-05
Payer: MEDICARE

## 2020-02-05 ENCOUNTER — OUTPATIENT (OUTPATIENT)
Dept: OUTPATIENT SERVICES | Facility: HOSPITAL | Age: 70
LOS: 1 days | End: 2020-02-05
Payer: COMMERCIAL

## 2020-02-05 DIAGNOSIS — Z98.1 ARTHRODESIS STATUS: Chronic | ICD-10-CM

## 2020-02-05 DIAGNOSIS — Z98.890 OTHER SPECIFIED POSTPROCEDURAL STATES: Chronic | ICD-10-CM

## 2020-02-05 DIAGNOSIS — R91.1 SOLITARY PULMONARY NODULE: ICD-10-CM

## 2020-02-05 DIAGNOSIS — Z41.9 ENCOUNTER FOR PROCEDURE FOR PURPOSES OTHER THAN REMEDYING HEALTH STATE, UNSPECIFIED: Chronic | ICD-10-CM

## 2020-02-05 PROCEDURE — 71250 CT THORAX DX C-: CPT | Mod: 26

## 2020-02-05 PROCEDURE — 71250 CT THORAX DX C-: CPT

## 2020-02-07 NOTE — H&P PST ADULT - BLOOD TRANSFUSION, PREVIOUS, PROFILE
[facility] Transitions Follow Up Call    2020    Patient: Juanita Awad  Patient : 1948   MRN: [id number]  Reason for Admission:   Discharge Date: 20 RARS: Readmission Risk Score: 23         Spoke with: Scottie Jama Transitions Subsequent and Final Call    Subsequent and Final Calls  Do you have any ongoing symptoms?:  Yes  Onset of Patient-reported symptoms: In the past 7 days  Patient-reported symptoms:  Other  Interventions for patient-reported symptoms:  Notified PCP/Physician  Have your medications changed?:  No  Do you have any questions related to your medications?:  No  Do you currently have any active services?:  No  Do you have any needs or concerns that I can assist you with?:  Yes  Patient-reported Needs or Concerns:  see CC note  Identified Barriers:  None  Care Transitions Interventions                          Other Interventions: Follow Up : Spoke with patient today for follow up phone call. She says her upper leg is still hurting. She was advised to be seen at Urgent Care, but states she did not go. She says she has a heating pad on her leg and is trying to \"wait it out\". She was advised that if she waits to be seen, the outcome could potentially be worse. She states she is aware and the last time she waited, she had a blood clot from her ankle to her groin. Again, advised her that she needed to be seen ASAP. Patient states she did not have a way to go, her sister is her transportation, and she had an appointment. Did inquire if she had anyone else, and she says she has some nephews but they are unreliable. Did ask if she would call EMS to come and take her to the ED if she felt she needed to go, and she said she would not, she was not going to have to pay that bill again. Will make Dr. Richard Alan office aware of current situation, and once again encouraged patient to be seen by doctor soon, and not to wait. Will follow up with patient at a later time.    Future no

## 2020-02-11 ENCOUNTER — APPOINTMENT (OUTPATIENT)
Dept: INTERNAL MEDICINE | Facility: CLINIC | Age: 70
End: 2020-02-11
Payer: MEDICARE

## 2020-02-11 ENCOUNTER — NON-APPOINTMENT (OUTPATIENT)
Age: 70
End: 2020-02-11

## 2020-02-11 VITALS
WEIGHT: 178 LBS | SYSTOLIC BLOOD PRESSURE: 100 MMHG | RESPIRATION RATE: 18 BRPM | HEIGHT: 73 IN | DIASTOLIC BLOOD PRESSURE: 68 MMHG | OXYGEN SATURATION: 97 % | BODY MASS INDEX: 23.59 KG/M2 | HEART RATE: 71 BPM | TEMPERATURE: 98 F

## 2020-02-11 PROCEDURE — 99214 OFFICE O/P EST MOD 30 MIN: CPT | Mod: 25

## 2020-02-11 PROCEDURE — 94060 EVALUATION OF WHEEZING: CPT

## 2020-02-11 RX ORDER — FAMOTIDINE 40 MG/1
TABLET, FILM COATED ORAL
Refills: 0 | Status: DISCONTINUED | COMMUNITY
End: 2020-02-11

## 2020-02-11 NOTE — PLAN
[FreeTextEntry1] : 1. Will continue to observe from a pulmonary standpoint.\par \par 2. I have recommended that the patient consider undergoing a modified barium esophagram, to assess for possible microaspiration, which may be contributing to his symptomatology. He will discuss this with Dr. Baptiste, as well as Dr. Glanzmann.\par \par 3. Routine medical followup with Dr. Baptiste.\par \par 4. Follow up with myself in October with full pulmonary function testing.

## 2020-02-11 NOTE — PHYSICAL EXAM
[Well Nourished] : well nourished [Well-Appearing] : well-appearing [Normal Oropharynx] : the oropharynx was normal [No JVD] : no jugular venous distention [Supple] : supple [Regular Rhythm] : with a regular rhythm [Clear to Auscultation] : lungs were clear to auscultation bilaterally [No Edema] : there was no peripheral edema [Normal S1, S2] : normal S1 and S2 [No Extremity Clubbing/Cyanosis] : no extremity clubbing/cyanosis [Normal Supraclavicular Nodes] : no supraclavicular lymphadenopathy [Normal Bowel Sounds] : normal bowel sounds [Soft] : abdomen soft [No CVA Tenderness] : no CVA  tenderness [Normal Anterior Cervical Nodes] : no anterior cervical lymphadenopathy [Normal Posterior Cervical Nodes] : no posterior cervical lymphadenopathy [No Rash] : no rash

## 2020-02-11 NOTE — DATA REVIEWED
[FreeTextEntry1] : Spirometric analysis reveals a minimal degree of restriction. Flow rates are normal with minimal bronchodilator reactivity.\par \par CAT scan of the chest is reviewed. The previously noted groundglass opacity in the right lower lobe has resolved completely

## 2020-02-11 NOTE — HISTORY OF PRESENT ILLNESS
[de-identified] : Patient comes in today for a followup evaluation, and reassessment of his pulmonary status.\par \par The patient was initially seen by myself in October for evaluation of intermittent hiccups. He describes these episodes as a loud gasp. An etiology for these episodes, has not been found. He continues to have them maybe once or twice a day.\par \par The patient was noted to have a nonproductive cough. He notes it occasionally, he will call for have these above-noted episodes when swallowing. He did undergo a CAT scan of the abdomen, which was ordered by Dr. Glanzmann. A CAT scan of the chest, was also performed, and it showed a small ground glass opacity. When he was initially evaluated by myself, he was noted to have a mild restrictive process. No intervention was given with regards to the ground glass opacity, and the plan was to just followup in 6 months.\par \par At this time, the patient states he is doing relatively well. He denies any wheeze or dyspnea. He will once again begin his exercise regimen, which has been on hold due to his recent back surgery. At times, he feels as if liquids "go down the wrong pipe." He now comes in for assessment.

## 2020-03-02 NOTE — PATIENT PROFILE ADULT. - TEACHING/LEARNING FACTORS IMPACT ABILITY TO LEARN
none Graft Cartilage Fenestration Text: The cartilage was fenestrated with a 2mm punch biopsy to help facilitate graft survival and healing.

## 2020-03-05 ENCOUNTER — APPOINTMENT (OUTPATIENT)
Dept: OTOLARYNGOLOGY | Facility: CLINIC | Age: 70
End: 2020-03-05

## 2020-03-10 NOTE — H&P PST ADULT - AS O2 DELIVERY
Detail Level: Detailed Patient Specific Counseling (Will Not Stick From Patient To Patient): Ferdinand Carlson called into [facility] room air

## 2020-03-11 ENCOUNTER — APPOINTMENT (OUTPATIENT)
Dept: OTOLARYNGOLOGY | Facility: CLINIC | Age: 70
End: 2020-03-11
Payer: MEDICARE

## 2020-03-11 VITALS
HEIGHT: 73 IN | BODY MASS INDEX: 23.86 KG/M2 | HEART RATE: 62 BPM | SYSTOLIC BLOOD PRESSURE: 97 MMHG | WEIGHT: 180 LBS | DIASTOLIC BLOOD PRESSURE: 70 MMHG

## 2020-03-11 DIAGNOSIS — J32.2 CHRONIC ETHMOIDAL SINUSITIS: ICD-10-CM

## 2020-03-11 PROCEDURE — 99214 OFFICE O/P EST MOD 30 MIN: CPT | Mod: 25

## 2020-03-11 PROCEDURE — 31575 DIAGNOSTIC LARYNGOSCOPY: CPT

## 2020-03-11 NOTE — HISTORY OF PRESENT ILLNESS
[de-identified] : co loss voice uri now colored nasal dc and persistent cough\par had pulmonary eval 3 mo ago

## 2020-03-11 NOTE — PROCEDURE
[FreeTextEntry6] : Indication:  Unable to examine nasal passages and paranasal sinus drainage adequately with anterior rhinoscopy.\par Sinus endoscope # 4\par Nasal septum exam shows   mild  septal deviation\par The inferior nasal turbinates are moderate in size with normal mucosa.\par The sinus endoscope was introduced into the right nares.   \par exam right middle meatus reveals 2+ mucopus.  There is moderate inflammation present in the middle meatus and involving the middle turbinate.\par The scope was advanced and the sphenoethmoid region was inspected.\par The superior meatus and nasal vault are unremarkable.  The nasopharynx is unremarkable without inflammation or mass\par The sinus endoscope was introduced into the left nares.\par Examination of the left middle meatus reveals 2+ mucopus with moderate inflammation of the middle turbinate.\par The scope was advanced and the sphenoethmoid region was inspected.\par The left superior meatus and nasal vault are unremarkable.  \par The fiberoptic scope was introduced to the posterior pharynx and exam on endolarynx is wnl w good vocal cord mobility.\par No lesion noted in hypopharynx.\par \par \par \par

## 2020-03-11 NOTE — REVIEW OF SYSTEMS
[Nasal Congestion] : nasal congestion [Cough] : cough [Negative] : Respiratory [de-identified] : small amount of phlegm

## 2020-08-01 ENCOUNTER — TRANSCRIPTION ENCOUNTER (OUTPATIENT)
Age: 70
End: 2020-08-01

## 2020-08-21 ENCOUNTER — TRANSCRIPTION ENCOUNTER (OUTPATIENT)
Age: 70
End: 2020-08-21

## 2020-09-09 ENCOUNTER — APPOINTMENT (OUTPATIENT)
Dept: ULTRASOUND IMAGING | Facility: CLINIC | Age: 70
End: 2020-09-09
Payer: MEDICARE

## 2020-09-09 ENCOUNTER — RESULT REVIEW (OUTPATIENT)
Age: 70
End: 2020-09-09

## 2020-09-09 ENCOUNTER — OUTPATIENT (OUTPATIENT)
Dept: OUTPATIENT SERVICES | Facility: HOSPITAL | Age: 70
LOS: 1 days | End: 2020-09-09
Payer: MEDICARE

## 2020-09-09 DIAGNOSIS — Z98.890 OTHER SPECIFIED POSTPROCEDURAL STATES: Chronic | ICD-10-CM

## 2020-09-09 DIAGNOSIS — Z41.9 ENCOUNTER FOR PROCEDURE FOR PURPOSES OTHER THAN REMEDYING HEALTH STATE, UNSPECIFIED: Chronic | ICD-10-CM

## 2020-09-09 DIAGNOSIS — Z98.1 ARTHRODESIS STATUS: Chronic | ICD-10-CM

## 2020-09-09 DIAGNOSIS — Z00.8 ENCOUNTER FOR OTHER GENERAL EXAMINATION: ICD-10-CM

## 2020-09-09 DIAGNOSIS — N28.1 CYST OF KIDNEY, ACQUIRED: ICD-10-CM

## 2020-09-09 PROCEDURE — 76775 US EXAM ABDO BACK WALL LIM: CPT

## 2020-09-09 PROCEDURE — 76775 US EXAM ABDO BACK WALL LIM: CPT | Mod: 26

## 2020-09-29 ENCOUNTER — APPOINTMENT (OUTPATIENT)
Dept: DERMATOLOGY | Facility: CLINIC | Age: 70
End: 2020-09-29
Payer: MEDICARE

## 2020-09-29 DIAGNOSIS — R21 RASH AND OTHER NONSPECIFIC SKIN ERUPTION: ICD-10-CM

## 2020-09-29 PROCEDURE — 99213 OFFICE O/P EST LOW 20 MIN: CPT

## 2020-09-29 RX ORDER — HYDROCODONE BITARTRATE AND HOMATROPINE METHYLBROMIDE 5; 1.5 MG/5ML; MG/5ML
5-1.5 SYRUP ORAL
Qty: 240 | Refills: 0 | Status: DISCONTINUED | COMMUNITY
Start: 2020-03-11 | End: 2020-09-29

## 2020-09-29 RX ORDER — CIPROFLOXACIN HYDROCHLORIDE 500 MG/1
500 TABLET, FILM COATED ORAL TWICE DAILY
Qty: 20 | Refills: 1 | Status: DISCONTINUED | COMMUNITY
Start: 2020-03-11 | End: 2020-09-29

## 2020-09-29 RX ORDER — HYDROCODONE BITARTRATE AND HOMATROPINE METHYLBROMIDE 5; 1.5 MG/5ML; MG/5ML
5-1.5 SYRUP ORAL
Qty: 240 | Refills: 0 | Status: DISCONTINUED | COMMUNITY
Start: 2019-10-02 | End: 2020-09-29

## 2020-09-29 NOTE — HISTORY OF PRESENT ILLNESS
[de-identified] : The patient has been fit in for urgent appointment.\par pt. c/o lesion on L forearm;  had bite;  concerned about Lyme

## 2020-09-29 NOTE — PHYSICAL EXAM
[FreeTextEntry3] : erythematous macule with punctum and hemorrhagic border; \par no signs of ECM or other rashes

## 2020-09-29 NOTE — ASSESSMENT
[FreeTextEntry1] : Rash:  bite; \par already resolving;  no suspicion of tick bite;  \par no treatments needed unless Sxs recur

## 2020-10-05 ENCOUNTER — TRANSCRIPTION ENCOUNTER (OUTPATIENT)
Age: 70
End: 2020-10-05

## 2020-11-03 ENCOUNTER — APPOINTMENT (OUTPATIENT)
Dept: INTERNAL MEDICINE | Facility: CLINIC | Age: 70
End: 2020-11-03
Payer: MEDICARE

## 2020-11-03 VITALS
OXYGEN SATURATION: 97 % | RESPIRATION RATE: 18 BRPM | HEIGHT: 73 IN | WEIGHT: 180 LBS | TEMPERATURE: 97.8 F | HEART RATE: 73 BPM | DIASTOLIC BLOOD PRESSURE: 76 MMHG | SYSTOLIC BLOOD PRESSURE: 118 MMHG | BODY MASS INDEX: 23.86 KG/M2

## 2020-11-03 DIAGNOSIS — Z20.828 ENCOUNTER FOR PREPROCEDURAL LABORATORY EXAMINATION: ICD-10-CM

## 2020-11-03 DIAGNOSIS — Z01.812 ENCOUNTER FOR PREPROCEDURAL LABORATORY EXAMINATION: ICD-10-CM

## 2020-11-03 DIAGNOSIS — R06.6 HICCOUGH: ICD-10-CM

## 2020-11-03 DIAGNOSIS — R07.89 OTHER CHEST PAIN: ICD-10-CM

## 2020-11-03 PROCEDURE — 99072 ADDL SUPL MATRL&STAF TM PHE: CPT

## 2020-11-03 PROCEDURE — 99215 OFFICE O/P EST HI 40 MIN: CPT | Mod: 25

## 2020-11-03 NOTE — PHYSICAL EXAM
[No Acute Distress] : no acute distress [Well Nourished] : well nourished [Well Developed] : well developed [Well-Appearing] : well-appearing [Normal Sclera/Conjunctiva] : normal sclera/conjunctiva [PERRL] : pupils equal round and reactive to light [EOMI] : extraocular movements intact [Normal Outer Ear/Nose] : the outer ears and nose were normal in appearance [Normal Oropharynx] : the oropharynx was normal [No JVD] : no jugular venous distention [Supple] : supple [Thyroid Normal, No Nodules] : the thyroid was normal and there were no nodules present [No Respiratory Distress] : no respiratory distress  [No Accessory Muscle Use] : no accessory muscle use [Clear to Auscultation] : lungs were clear to auscultation bilaterally [Normal Rate] : normal rate  [Regular Rhythm] : with a regular rhythm [Normal S1, S2] : normal S1 and S2 [No Murmur] : no murmur heard [No Edema] : there was no peripheral edema [No Extremity Clubbing/Cyanosis] : no extremity clubbing/cyanosis [Soft] : abdomen soft [Non Tender] : non-tender [Non-distended] : non-distended [No Masses] : no abdominal mass palpated [No HSM] : no HSM [Normal Bowel Sounds] : normal bowel sounds [Normal Posterior Cervical Nodes] : no posterior cervical lymphadenopathy [Normal Anterior Cervical Nodes] : no anterior cervical lymphadenopathy [No CVA Tenderness] : no CVA  tenderness [No Spinal Tenderness] : no spinal tenderness [No Joint Swelling] : no joint swelling [Grossly Normal Strength/Tone] : grossly normal strength/tone [No Rash] : no rash [Coordination Grossly Intact] : coordination grossly intact [Normal Gait] : normal gait [Deep Tendon Reflexes (DTR)] : deep tendon reflexes were 2+ and symmetric [Normal Affect] : the affect was normal [Normal Insight/Judgement] : insight and judgment were intact [de-identified] : Bilateral symmetric gynecomastia is present [de-identified] : There is a well-healed scar in the midline of the lumbosacral spine

## 2020-11-03 NOTE — HISTORY OF PRESENT ILLNESS
[de-identified] : The patient comes in today for a follow up evaluation, and yearly reassessment of his pulmonary status.\par \par The patient states, that he has been maintained at home, primarily on Flonase alone. He has been doing relatively well from a pulmonary standpoint. The patient, however, did develop a focal area of anterior chest pain in the left upper chest region approximately 1-2 weeks ago. The pain was described as a dull ache which occurred primarily with deep breathing. At times it felt as if there was a pressure in this location. He contacted both his cardiologist, Dr. Baptiste, as well as his gastroenterologist, Dr. Glanzmann. Dr. Glanzmann increased his Protonix for 2 weeks to 40 mg b.i.d. The cardiology team, did not feel that this was cardiac in nature. Since increasing the medication, as well as modifying his diet such as cutting out dark chocolate in the evening, his symptoms have almost completely resolved.\par \par The patient still does have a persistent cough which can occur in the evenings. He still has issues with hiccups. He does occasionally aspirate with thin liquids. He never underwent a modified barium swallow I had recommended. He is not using any metered-dose inhalers. He denies any significant sputum production or wheeze. He now comes in for this assessment.

## 2020-11-03 NOTE — PLAN
[FreeTextEntry1] : 1. Continue with medication as outlined above.\par \par 2. Will continue to observe pulmonary status without bronchodilators.\par \par 3. The patient will consider the modified barium esophagram in the future to evaluate for  microaspiration.\par \par 4. The patient will undergo full pulmonary function testing next week.\par \par 5. Routine GI and cardiology followup with Drs. Glanzmann, and marlon respectively.\par \par 6. Follow up with myself in 6 months with pre-post-spirometry and O2 saturation.\par \par 7. Await stress echocardiogram which will be performed by Dr. Baptiste

## 2020-11-10 LAB — SARS-COV-2 N GENE NPH QL NAA+PROBE: NOT DETECTED

## 2020-11-11 ENCOUNTER — APPOINTMENT (OUTPATIENT)
Dept: INTERNAL MEDICINE | Facility: CLINIC | Age: 70
End: 2020-11-11
Payer: MEDICARE

## 2020-11-11 VITALS
WEIGHT: 185 LBS | HEART RATE: 74 BPM | DIASTOLIC BLOOD PRESSURE: 75 MMHG | OXYGEN SATURATION: 99 % | HEIGHT: 73 IN | RESPIRATION RATE: 16 BRPM | SYSTOLIC BLOOD PRESSURE: 118 MMHG | TEMPERATURE: 98.1 F | BODY MASS INDEX: 24.52 KG/M2

## 2020-11-11 PROCEDURE — 99072 ADDL SUPL MATRL&STAF TM PHE: CPT

## 2020-11-11 PROCEDURE — 94727 GAS DIL/WSHOT DETER LNG VOL: CPT

## 2020-11-11 PROCEDURE — 94010 BREATHING CAPACITY TEST: CPT

## 2020-11-11 PROCEDURE — 94729 DIFFUSING CAPACITY: CPT

## 2020-11-12 ENCOUNTER — NON-APPOINTMENT (OUTPATIENT)
Age: 70
End: 2020-11-12

## 2020-12-15 ENCOUNTER — APPOINTMENT (OUTPATIENT)
Dept: DERMATOLOGY | Facility: CLINIC | Age: 70
End: 2020-12-15
Payer: MEDICARE

## 2020-12-15 VITALS — HEIGHT: 73 IN | BODY MASS INDEX: 24.25 KG/M2 | WEIGHT: 183 LBS

## 2020-12-15 PROCEDURE — 99072 ADDL SUPL MATRL&STAF TM PHE: CPT

## 2020-12-15 PROCEDURE — 99213 OFFICE O/P EST LOW 20 MIN: CPT

## 2020-12-15 NOTE — PHYSICAL EXAM
[Alert] : alert [Oriented x 3] : ~L oriented x 3 [Well Nourished] : well nourished [Full Body Skin Exam Performed] : performed [FreeTextEntry3] : A full skin exam was performed including the scalp, face (including lips, ears, nose and eyes), neck, chest, abdomen, back, buttocks, upper extremities and lower extremities.  The patient declined examination of the genitalia.  \par The exam revealed the following benign growths:\par [place] pigmented nevi.\par Seborrheic keratoses.\par Lentigines.\par Cherry angioma.\par \par Cystic nodule of the left shoulder, mild tenderness.

## 2020-12-15 NOTE — HISTORY OF PRESENT ILLNESS
[FreeTextEntry1] : Patient presents for skin examination. [de-identified] : Bump of the left shoulder.  Increasing in size over months.  No bleeding or drainage.

## 2020-12-15 NOTE — ASSESSMENT
[FreeTextEntry1] : A complete skin examination was performed.  There is no evidence of skin cancer.  We discussed the importance of photoprotection, including the use of hats, protective clothing and sunscreens with an SPF of at least 30.  Sun avoidance was also discussed.  The ABCDE's of melanoma was discussed.  Regular skin exams recommended.\par \par irritated EIC of the left back.\par Plan excision.\par \par Discussed IPL for the face.\par Cosmetic.\par CYP/tx for 1-3+ txs as desired.\par Risks/benefits discussed.

## 2021-01-05 ENCOUNTER — APPOINTMENT (OUTPATIENT)
Dept: DERMATOLOGY | Facility: CLINIC | Age: 71
End: 2021-01-05
Payer: MEDICARE

## 2021-01-05 PROCEDURE — 12032 INTMD RPR S/A/T/EXT 2.6-7.5: CPT

## 2021-01-05 PROCEDURE — 11402 EXC TR-EXT B9+MARG 1.1-2 CM: CPT

## 2021-01-05 PROCEDURE — 99072 ADDL SUPL MATRL&STAF TM PHE: CPT

## 2021-01-08 LAB — CORE LAB BIOPSY: NORMAL

## 2021-01-19 ENCOUNTER — APPOINTMENT (OUTPATIENT)
Dept: DERMATOLOGY | Facility: CLINIC | Age: 71
End: 2021-01-19
Payer: SELF-PAY

## 2021-01-19 DIAGNOSIS — L72.3 SEBACEOUS CYST: ICD-10-CM

## 2021-01-19 PROCEDURE — D0083: CPT

## 2021-02-09 ENCOUNTER — NON-APPOINTMENT (OUTPATIENT)
Age: 71
End: 2021-02-09

## 2021-04-25 LAB — SARS-COV-2 N GENE NPH QL NAA+PROBE: NOT DETECTED

## 2021-04-27 ENCOUNTER — APPOINTMENT (OUTPATIENT)
Dept: INTERNAL MEDICINE | Facility: CLINIC | Age: 71
End: 2021-04-27
Payer: MEDICARE

## 2021-04-27 VITALS
WEIGHT: 190 LBS | RESPIRATION RATE: 14 BRPM | OXYGEN SATURATION: 98 % | DIASTOLIC BLOOD PRESSURE: 75 MMHG | SYSTOLIC BLOOD PRESSURE: 114 MMHG | TEMPERATURE: 97.9 F | HEIGHT: 72 IN | BODY MASS INDEX: 25.73 KG/M2 | HEART RATE: 78 BPM

## 2021-04-27 PROCEDURE — 94729 DIFFUSING CAPACITY: CPT

## 2021-04-27 PROCEDURE — 99072 ADDL SUPL MATRL&STAF TM PHE: CPT

## 2021-04-27 PROCEDURE — ZZZZZ: CPT

## 2021-04-27 PROCEDURE — 94727 GAS DIL/WSHOT DETER LNG VOL: CPT

## 2021-04-27 PROCEDURE — 99214 OFFICE O/P EST MOD 30 MIN: CPT | Mod: 25

## 2021-04-27 PROCEDURE — 94060 EVALUATION OF WHEEZING: CPT

## 2021-04-27 NOTE — HISTORY OF PRESENT ILLNESS
[FreeTextEntry1] : The patient comes in today for a routine followup pulmonary evaluation.\par  [de-identified] : The patient comes in today for a followup pulmonary assessment. The patient is being evaluated for coughing paroxysms. He states that these episodes are still occurring. He has not undergone a barium swallow but I had recommended. He has these episodes in which she "clears his throat." He did undergo an ENT evaluation by Dr. Coleman, and the evaluation was unremarkable. The episodes, appeared to occur nocturnally, and at times during the day as well. The cough is usually nonproductive.\par \par The patient is currently taking Protonix, plus Pepcid for treatment of his reflux symptoms. This is being followed by Dr. Glanzmann.\par \par The patient did have some nasal congestion and slight postnasal drip. He subsequently discontinued his Flonase due to the fact that he had a bloody nose. He now comes in for this assessment.

## 2021-04-27 NOTE — PLAN
[FreeTextEntry1] : 1. Will now institute empiric trial of prednisone, in which she will take 40 mg of 3 days, followed by 20 mg for 3 days, and 10 mg of 4 days before discontinuing.\par \par 2. Flovent 220, 2 puffs b.i.d. The patient has been given extensive instruction the proper technique for use of a metered dose inhaler.\par \par 3. Will observe his postnasal drip without Flonase at this time\par \par 4. We will assess his response clinically to the above-noted interventions. If the symptoms persist, we'll then consider a barium swallow\par \par 5. Followup in 6 months with office visit and pre-post-spirometry. We will assess his response to the above-noted interventions.

## 2021-04-27 NOTE — DATA REVIEWED
[FreeTextEntry1] : A pulmonary function test is performed. Lung volumes reveal a mild decrease in the total lung capacity and residual volume. The vital capacity and FRC are normal. Lung mechanics revealed normal flow rates. Mild bronchodilator reactivity is demonstrated. The DLCO is mildly reduced, however, when corrected for alveolar volume is normal. Saturations maintained. This represents a minimal degree of restriction. Airway reactivity is noted.

## 2021-04-27 NOTE — PHYSICAL EXAM
[Well Nourished] : well nourished [Well-Appearing] : well-appearing [Normal Oropharynx] : the oropharynx was normal [No JVD] : no jugular venous distention [Supple] : supple [Regular Rhythm] : with a regular rhythm [Normal S1, S2] : normal S1 and S2 [No Edema] : there was no peripheral edema [No Extremity Clubbing/Cyanosis] : no extremity clubbing/cyanosis [Soft] : abdomen soft [Normal Bowel Sounds] : normal bowel sounds [Normal Supraclavicular Nodes] : no supraclavicular lymphadenopathy [Normal Posterior Cervical Nodes] : no posterior cervical lymphadenopathy [Normal Anterior Cervical Nodes] : no anterior cervical lymphadenopathy [No CVA Tenderness] : no CVA  tenderness [No Rash] : no rash [de-identified] : A slight wheezes noted at the end of expiration. There is a slight prolongation of expiratory phase.

## 2021-04-28 ENCOUNTER — TRANSCRIPTION ENCOUNTER (OUTPATIENT)
Age: 71
End: 2021-04-28

## 2021-05-18 ENCOUNTER — APPOINTMENT (OUTPATIENT)
Dept: INTERNAL MEDICINE | Facility: CLINIC | Age: 71
End: 2021-05-18

## 2021-06-09 ENCOUNTER — TRANSCRIPTION ENCOUNTER (OUTPATIENT)
Age: 71
End: 2021-06-09

## 2021-09-11 ENCOUNTER — EMERGENCY (EMERGENCY)
Facility: HOSPITAL | Age: 71
LOS: 0 days | Discharge: HOME | End: 2021-09-11
Attending: EMERGENCY MEDICINE
Payer: MEDICARE

## 2021-09-11 VITALS — HEIGHT: 73 IN | WEIGHT: 175.05 LBS

## 2021-09-11 VITALS
RESPIRATION RATE: 18 BRPM | HEART RATE: 94 BPM | SYSTOLIC BLOOD PRESSURE: 138 MMHG | DIASTOLIC BLOOD PRESSURE: 99 MMHG | TEMPERATURE: 98 F | OXYGEN SATURATION: 100 %

## 2021-09-11 DIAGNOSIS — F41.0 PANIC DISORDER [EPISODIC PAROXYSMAL ANXIETY]: ICD-10-CM

## 2021-09-11 DIAGNOSIS — T23.102A BURN OF FIRST DEGREE OF LEFT HAND, UNSPECIFIED SITE, INITIAL ENCOUNTER: ICD-10-CM

## 2021-09-11 DIAGNOSIS — Z98.890 OTHER SPECIFIED POSTPROCEDURAL STATES: Chronic | ICD-10-CM

## 2021-09-11 DIAGNOSIS — Z41.9 ENCOUNTER FOR PROCEDURE FOR PURPOSES OTHER THAN REMEDYING HEALTH STATE, UNSPECIFIED: Chronic | ICD-10-CM

## 2021-09-11 DIAGNOSIS — Z86.19 PERSONAL HISTORY OF OTHER INFECTIOUS AND PARASITIC DISEASES: ICD-10-CM

## 2021-09-11 DIAGNOSIS — Z79.899 OTHER LONG TERM (CURRENT) DRUG THERAPY: ICD-10-CM
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
## 2021-09-11 DIAGNOSIS — F32.9 MAJOR DEPRESSIVE DISORDER, SINGLE EPISODE, UNSPECIFIED: ICD-10-CM

## 2021-09-11 DIAGNOSIS — Z88.1 ALLERGY STATUS TO OTHER ANTIBIOTIC AGENTS STATUS: ICD-10-CM

## 2021-09-11 DIAGNOSIS — E03.9 HYPOTHYROIDISM, UNSPECIFIED: ICD-10-CM

## 2021-09-11 DIAGNOSIS — Z98.1 ARTHRODESIS STATUS: Chronic | ICD-10-CM

## 2021-09-11 DIAGNOSIS — Z87.39 PERSONAL HISTORY OF OTHER DISEASES OF THE MUSCULOSKELETAL SYSTEM AND CONNECTIVE TISSUE: ICD-10-CM

## 2021-09-11 DIAGNOSIS — I34.2 NONRHEUMATIC MITRAL (VALVE) STENOSIS: ICD-10-CM

## 2021-09-11 DIAGNOSIS — H26.9 UNSPECIFIED CATARACT: ICD-10-CM

## 2021-09-11 DIAGNOSIS — T31.0 BURNS INVOLVING LESS THAN 10% OF BODY SURFACE: ICD-10-CM

## 2021-09-11 PROCEDURE — 99283 EMERGENCY DEPT VISIT LOW MDM: CPT

## 2021-09-11 PROCEDURE — 99284 EMERGENCY DEPT VISIT MOD MDM: CPT

## 2021-09-11 RX ORDER — OXYCODONE AND ACETAMINOPHEN 5; 325 MG/1; MG/1
1 TABLET ORAL ONCE
Refills: 0 | Status: DISCONTINUED | OUTPATIENT
Start: 2021-09-11 | End: 2021-09-11

## 2021-09-11 RX ORDER — BACITRACIN ZINC 500 UNIT/G
1 OINTMENT IN PACKET (EA) TOPICAL ONCE
Refills: 0 | Status: COMPLETED | OUTPATIENT
Start: 2021-09-11 | End: 2021-09-11

## 2021-09-11 RX ADMIN — OXYCODONE AND ACETAMINOPHEN 1 TABLET(S): 5; 325 TABLET ORAL at 18:31

## 2021-09-11 RX ADMIN — Medication 1 APPLICATION(S): at 18:29
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
## 2021-09-11 NOTE — ED STATDOCS - CARE PROVIDER_API CALL
Luis Felipe Marc)  Orthopaedic Surgery; Surgery of the Hand  [address] [place] [address]  Phone: [phone]  Fax: [phone]  Follow Up Time:

## 2021-09-11 NOTE — ED STATDOCS - NSFOLLOWUPINSTRUCTIONS_ED_ALL_ED_FT
Burn Care, Adult    A burn is an injury to the skin or the tissues under the skin that is caused by a fire, hot liquid, chemical, or electricity. There are three types of burns:  •First degree. These burns may cause the skin to be red and slightly swollen. These burns do not blister or scar.      •Second degree. These burns are very painful and cause the skin to be very red. The skin may also swell, leak fluid, look shiny, and develop blisters.      •Third degree. These burns cause permanent damage. They either turn the skin white or black and make it look charred, dry, and leathery. These burns may not be painful due to damage to the nerve endings.      Treatment for your burn will depend on the type of burn you have. Taking care of your burn properly can help to prevent pain and infection. It can also help the burn heal more quickly.      How to care for a first-degree burn    Right after a burn:     •Rinse or soak the burn under cool water for 5 minutes or more. Do not put ice on your burn. This can cause more damage.      •Apply a cool, clean, wet cloth (cool compress) to your skin. This may help with pain.      •Put lotion or gel with aloe vera on your skin. This may help soothe the burn.      Caring for the burn   Follow instructions from your health care provider about cleaning and caring for the burn. This may include:  •Using mild soap and water to clean the area.      •Using a clean cloth to pat the burned area dry after cleaning it. Do not rub or scrub the burn.      •Applying lotion or gel with aloe vera to your skin.        How to care for a second-degree burn    Right after a burn:     •Rinse or soak the burn under cool water. Do this for 5 to 10 minutes. Do not put ice on your burn. This can cause more damage.      •Remove any jewelry near the burned area.      •Lightly cover the burn with a clean cloth.      Caring for the burn     •Raise (elevate) the injured area above the level of your heart while sitting or lying down.    •Follow instructions from your health care provider about cleaning and caring for the burn. This may include:  •Cleaning or rinsing out (irrigating) the burned area.      •Putting a cream or ointment on the burn.      •Placing a germ-free (sterile) dressing over the burn. A dressing is a material that is placed over a burn to help it heal.          How to care for a third-degree burn    Right after a burn:     •Lightly cover the burn with a clean, dry cloth.    •Seek treatment right away if you have this kind of burn. You may:  •Require admission to the hospital.      •Be treated with surgery to remove damaged tissue or to place a skin graft to cover the damaged area.      •Be given IV fluids to keep you hydrated.        Caring for the burn   Follow instructions from your health care provider about cleaning and caring for the burn. This may include:  •Cleaning or rinsing out (irrigating) the burned area.      •Putting a cream or ointment on the burn.      •Placing a sterile dressing in the burned area (packing).      •Placing a sterile dressing over the burn.      Other instructions    •Elevate the injured area above the level of your heart while sitting or lying down.      •Wear splints or immobilizers as instructed by your health care provider.      •Rest as told by your health care provider. Do not participate in sports or other physical activities until your health care provider approves.        How to prevent infection when caring for a burn   •Take these steps to prevent infection:  •Wash your hands with soap and water for at least 20 seconds before and after burn care. If soap and water are not available, use hand .      •Wear clean or sterile gloves as directed by your health care provider.      •Do not put butter, oil, toothpaste, or other home remedies on the burn.      •Do not scratch or pick at the burn.      •Do not break any blisters.      •Do not peel the skin.      •Do not rub your burn, even when you are cleaning it.      •Check your burn every day for these signs of infection:  •More redness, swelling, or pain.      •Warmth.      •Pus or a bad smell.      •Red streaks around the burn.          Follow these instructions at home     Medicines     •Take over-the-counter and prescription medicines only as told by your health care provider.      •If you were prescribed an antibiotic medicine, take or apply it as told by your health care provider. Do not stop using the antibiotic even if your condition improves.      •Your health care provider may recommend taking over-the-counter or prescription pain medicine before changing your dressing.      General instructions     •Protect your burn from the sun.      •Drink enough fluid to keep your urine pale yellow.      • Do not use any products that contain nicotine or tobacco, such as cigarettes, e-cigarettes, and chewing tobacco. These can delay healing. If you need help quitting, ask your health care provider.      •Keep all follow-up visits as told by your health care provider. This is important.        Contact a health care provider if:    •Your condition does not improve.      •Your condition gets worse.      •You have a fever or chills.      •Your burn feels warm to the touch.      •You have more redness, swelling, or pain at the site of the burn.      •Your burn changes in appearance or develops black or red spots.      •You have pain that is not controlled with medicine.        Get help right away if you have:    •More fluid, blood, or pus coming from your burn.      •Red streaks near the burn.      •Severe pain.        Summary    •There are three types of burns. They are first degree, second degree, and third degree. The most severe type of burn is a third-degree burn which must be treated right away.      •Treatment for your burn will depend on the type of burn you have.      • Do not put butter, oil, toothpaste, or other home remedies on the burn. This can cause more damage to the tissue.      •Follow instructions from your health care provider about how to clean and take care of your burn.      This information is not intended to replace advice given to you by your health care provider. Make sure you discuss any questions you have with your health care provider.
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
## 2021-09-11 NOTE — ED STATDOCS - PATIENT PORTAL LINK FT
You can access the FollowMyHealth Patient Portal offered by [facility] by registering at the following website: [email or web address]. By joining [facility]’s FollowMyHealth portal, you will also be able to view your health information using other applications (apps) compatible with our system.
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
## 2021-10-19 ENCOUNTER — APPOINTMENT (OUTPATIENT)
Dept: DERMATOLOGY | Facility: CLINIC | Age: 71
End: 2021-10-19
Payer: SELF-PAY

## 2021-10-19 DIAGNOSIS — Z41.1 ENCOUNTER FOR COSMETIC SURGERY: ICD-10-CM

## 2021-10-19 PROCEDURE — D0083: CPT

## 2021-10-19 PROCEDURE — D0123: CPT

## 2021-10-26 ENCOUNTER — APPOINTMENT (OUTPATIENT)
Dept: INTERNAL MEDICINE | Facility: CLINIC | Age: 71
End: 2021-10-26
Payer: MEDICARE

## 2021-10-26 VITALS
SYSTOLIC BLOOD PRESSURE: 110 MMHG | OXYGEN SATURATION: 95 % | TEMPERATURE: 97.8 F | BODY MASS INDEX: 24.38 KG/M2 | HEART RATE: 64 BPM | RESPIRATION RATE: 16 BRPM | HEIGHT: 72 IN | WEIGHT: 180 LBS | DIASTOLIC BLOOD PRESSURE: 74 MMHG

## 2021-10-26 PROCEDURE — 99214 OFFICE O/P EST MOD 30 MIN: CPT

## 2021-10-26 RX ORDER — HYDROCODONE BITARTRATE AND HOMATROPINE METHYLBROMIDE 5; 1.5 MG/5ML; MG/5ML
5-1.5 SYRUP ORAL
Qty: 240 | Refills: 0 | Status: DISCONTINUED | COMMUNITY
Start: 2020-03-11 | End: 2021-10-26

## 2021-10-26 RX ORDER — PREDNISONE 20 MG/1
20 TABLET ORAL
Qty: 11 | Refills: 0 | Status: DISCONTINUED | COMMUNITY
Start: 2021-04-27 | End: 2021-10-26

## 2021-10-26 RX ORDER — INHALER, ASSIST DEVICES
SPACER (EA) MISCELLANEOUS
Qty: 1 | Refills: 2 | Status: ACTIVE | COMMUNITY
Start: 2021-10-26 | End: 1900-01-01

## 2021-10-26 RX ORDER — HYDROCODONE BITARTRATE AND ACETAMINOPHEN 7.5; 3 MG/1; MG/1
TABLET ORAL
Refills: 0 | Status: DISCONTINUED | COMMUNITY
End: 2021-10-26

## 2021-10-26 NOTE — HISTORY OF PRESENT ILLNESS
[FreeTextEntry1] : The patient comes in today for a routine pulmonary followup evaluation.\par  [de-identified] : The patient states that he is doing relatively well at this time. At the time of the last visit, we had treated him with a course of prednisone. He was also started on Flovent 220, 2 puffs b.i.d. The cough, resolved completely. He had contacted me several months ago, stating that he had complained of soreness and dryness of his throat. He noted that his voice was hoarse and at times raspy. 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 He now comes in for followup. He states, that the cough has continued to oswaldo, but he still does have soreness and dryness in the throat as well as hoarseness of the voice. He denies any cough or sputum production. He now comes in for this assessment.

## 2021-10-26 NOTE — PHYSICAL EXAM
[Well Nourished] : well nourished [Well-Appearing] : well-appearing [Normal Oropharynx] : the oropharynx was normal [No JVD] : no jugular venous distention [Supple] : supple [Clear to Auscultation] : lungs were clear to auscultation bilaterally [Regular Rhythm] : with a regular rhythm [Normal S1, S2] : normal S1 and S2 [No Edema] : there was no peripheral edema [No Extremity Clubbing/Cyanosis] : no extremity clubbing/cyanosis [Soft] : abdomen soft [Normal Bowel Sounds] : normal bowel sounds [Normal Supraclavicular Nodes] : no supraclavicular lymphadenopathy [Normal Posterior Cervical Nodes] : no posterior cervical lymphadenopathy [Normal Anterior Cervical Nodes] : no anterior cervical lymphadenopathy [No CVA Tenderness] : no CVA  tenderness [No Rash] : no rash

## 2021-10-26 NOTE — PLAN
[FreeTextEntry1] : 1. At this time, we'll not continue with Flovent 220, one puff b.i.d., however we will now instituted via the use of an AeroChamber. He will take this for the next several months, to see if this helps to minimize his symptoms soreness and dryness in the throat as most cut back on the hoarseness of his voice.\par \par 2. If it does improve, we will then consider switching to 2 puffs once a day, either in the morning or in the evening.\par \par 3. Followup in 6 months with full pulmonary function testing\par \par 4. Routine medical followup with Dr. Paiz.

## 2021-11-30 ENCOUNTER — APPOINTMENT (OUTPATIENT)
Dept: OTOLARYNGOLOGY | Facility: CLINIC | Age: 71
End: 2021-11-30

## 2022-01-12 ENCOUNTER — APPOINTMENT (OUTPATIENT)
Dept: DERMATOLOGY | Facility: CLINIC | Age: 72
End: 2022-01-12
Payer: MEDICARE

## 2022-01-12 DIAGNOSIS — D22.9 MELANOCYTIC NEVI, UNSPECIFIED: ICD-10-CM

## 2022-01-12 DIAGNOSIS — L85.3 XEROSIS CUTIS: ICD-10-CM

## 2022-01-12 DIAGNOSIS — L30.4 ERYTHEMA INTERTRIGO: ICD-10-CM

## 2022-01-12 DIAGNOSIS — B35.6 TINEA CRURIS: ICD-10-CM

## 2022-01-12 PROCEDURE — 99213 OFFICE O/P EST LOW 20 MIN: CPT

## 2022-01-12 NOTE — HISTORY OF PRESENT ILLNESS
[FreeTextEntry1] : Patient presents for skin examination. [de-identified] : Notes few areas of itching intermittently, chest folds, groin and buttocks.

## 2022-01-12 NOTE — ASSESSMENT
[FreeTextEntry1] : A complete skin examination was performed.  There is no evidence of skin cancer.  We discussed the importance of photoprotection, including the use of hats, protective clothing and sunscreens with an SPF of at least 30.  Sun avoidance was also discussed.  The ABCDE's of melanoma was discussed.  Regular skin exams recommended.\par \par Intertrigo\par Hytone cream bid prn.\par Keep region dry.\par \par T. cruris - currently doing well.\par Econazole cream prn.\par \par Xerosis\par AmLactin lotion daily.

## 2022-01-12 NOTE — PHYSICAL EXAM
[Alert] : alert [Oriented x 3] : ~L oriented x 3 [Well Nourished] : well nourished [Full Body Skin Exam Performed] : performed [FreeTextEntry3] : A full skin exam was performed including the scalp, face (including lips, ears, nose and eyes), neck, chest, abdomen, back, buttocks, upper extremities and lower extremities.  The patient declined examination of the genitalia.  \par The exam revealed the following benign growths:\par [place] pigmented nevi.\par Seborrheic keratoses.\par Lentigines.\par \par diffuse xerosis, especially on the legs.\par \par Erythema of the inferior chest folds and intergluteal crease.  Mild.

## 2022-04-04 ENCOUNTER — TRANSCRIPTION ENCOUNTER (OUTPATIENT)
Age: 72
End: 2022-04-04

## 2022-04-04 ENCOUNTER — RX RENEWAL (OUTPATIENT)
Age: 72
End: 2022-04-04

## 2022-05-10 ENCOUNTER — APPOINTMENT (OUTPATIENT)
Dept: INTERNAL MEDICINE | Facility: CLINIC | Age: 72
End: 2022-05-10
Payer: MEDICARE

## 2022-05-10 VITALS
RESPIRATION RATE: 16 BRPM | WEIGHT: 185 LBS | HEART RATE: 75 BPM | HEIGHT: 71 IN | DIASTOLIC BLOOD PRESSURE: 75 MMHG | BODY MASS INDEX: 25.9 KG/M2 | TEMPERATURE: 98.4 F | SYSTOLIC BLOOD PRESSURE: 111 MMHG | OXYGEN SATURATION: 97 %

## 2022-05-10 PROCEDURE — 94729 DIFFUSING CAPACITY: CPT

## 2022-05-10 PROCEDURE — 99215 OFFICE O/P EST HI 40 MIN: CPT | Mod: 25

## 2022-05-10 PROCEDURE — 94060 EVALUATION OF WHEEZING: CPT

## 2022-05-10 PROCEDURE — ZZZZZ: CPT

## 2022-05-10 PROCEDURE — 94727 GAS DIL/WSHOT DETER LNG VOL: CPT

## 2022-05-10 NOTE — PLAN
[FreeTextEntry1] : 1.  Continue with medical regimen as outlined above , Including Flovent 220, 2 puffs daily via an AeroChamber.\par \par 2.  The cardiovascular exercise regimen has been recommended.\par \par 3.  Routine medical follow-up with his primary care physician, Dr. Baptiste\par \par 4.  Follow-up with myself in 6 months with pre-Post spirometry and O2 saturation.

## 2022-05-10 NOTE — DATA REVIEWED
[FreeTextEntry1] : A pulmonary function test is performed.  Lung volumes and flow rates are within normal limits.  Mild bronchodilator reactivity is demonstrated.  The DLCO and saturation are maintained.  This represents normal physiologic function.  When compared to the prior study, the flow rates and total lung capacity have both improved.

## 2022-05-10 NOTE — HISTORY OF PRESENT ILLNESS
[FreeTextEntry1] : The patient comes in for a yearly pulmonary evaluation\par  [de-identified] : The patient states, that from a pulmonary standpoint, he is doing fairly well at this time.  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  He states that since instituting an AeroChamber, that his symptoms have improved significantly.  The strength of his voice is much better at the end of the day.  He denies any cracking of the voice.  He notes that his asthma has been under very good control.  He has not had any exacerbations.  As per the recommendations, he was told to cut back to 2 puffs once a day, from 2 puffs twice daily.  He has done this, and he has not deteriorated.\par \par The patient states, that he has minimal allergy symptoms.  He denies postnasal drip.  There has been no significant sputum production.  Occasionally he will clear his throat.  He has not been exercising.  There are no other pulmonary issues at present.  He now comes in for this assessment.

## 2022-05-21 ENCOUNTER — OUTPATIENT (OUTPATIENT)
Dept: OUTPATIENT SERVICES | Facility: HOSPITAL | Age: 72
LOS: 1 days | End: 2022-05-21
Payer: COMMERCIAL

## 2022-05-21 ENCOUNTER — APPOINTMENT (OUTPATIENT)
Dept: CT IMAGING | Facility: CLINIC | Age: 72
End: 2022-05-21
Payer: MEDICARE

## 2022-05-21 DIAGNOSIS — Z98.890 OTHER SPECIFIED POSTPROCEDURAL STATES: Chronic | ICD-10-CM

## 2022-05-21 DIAGNOSIS — R10.2 PELVIC AND PERINEAL PAIN: ICD-10-CM

## 2022-05-21 DIAGNOSIS — Z98.1 ARTHRODESIS STATUS: Chronic | ICD-10-CM

## 2022-05-21 DIAGNOSIS — Z41.9 ENCOUNTER FOR PROCEDURE FOR PURPOSES OTHER THAN REMEDYING HEALTH STATE, UNSPECIFIED: Chronic | ICD-10-CM

## 2022-05-21 PROCEDURE — 72192 CT PELVIS W/O DYE: CPT | Mod: 26

## 2022-05-21 PROCEDURE — 72192 CT PELVIS W/O DYE: CPT

## 2022-06-21 ENCOUNTER — APPOINTMENT (OUTPATIENT)
Dept: UROLOGY | Facility: CLINIC | Age: 72
End: 2022-06-21
Payer: MEDICARE

## 2022-06-21 VITALS
DIASTOLIC BLOOD PRESSURE: 83 MMHG | OXYGEN SATURATION: 98 % | HEART RATE: 69 BPM | HEIGHT: 71 IN | WEIGHT: 185 LBS | SYSTOLIC BLOOD PRESSURE: 131 MMHG | BODY MASS INDEX: 25.9 KG/M2

## 2022-06-21 DIAGNOSIS — N28.1 CYST OF KIDNEY, ACQUIRED: ICD-10-CM

## 2022-06-21 PROCEDURE — 99213 OFFICE O/P EST LOW 20 MIN: CPT

## 2022-06-21 RX ORDER — COVID-19 MOLECULAR TEST ASSAY
KIT MISCELLANEOUS
Qty: 8 | Refills: 0 | Status: COMPLETED | COMMUNITY
Start: 2022-05-18

## 2022-06-21 NOTE — PHYSICAL EXAM

## 2022-06-21 NOTE — HISTORY OF PRESENT ILLNESS
[FreeTextEntry1] : 67 yo M presents for follow up. He has history of BPH with LUTS. He poorly tolerated alpha blockers and  anticholinergics and is interested in TURP procedure. He also reports ED continues, with good rigidity but very poor duration. No improvement with sildenafil in the past. All symptoms have been ongoing for years. Nothing makes them better, nothing makes them worse. They are associated with nothing. \par \par 09/03/2019: Patient presents for follow up. He is s/p TURP and is here for TOV. He had penile pain with liz, improved with lubrication to urethra. Mild urgency.  No incontinence. No fevers, no chills, no nausea, no vomiting, no flank pain. Bladder was filled via Liz catheter by instilling 120 mL of sterile water. Liz catheter was then removed.  Pt then voided 100  mL. PVR 0  mL. \par \par 10/15/2019: Patient presents for follow up. He reports improvement of flow, frequency, and nocturia. Does report that at end of voiding, has hesitancy and has to force out urine. He also reports urgency. Has urge to void when he drinks 4 oz water. Scant few clots since last visit. No current hematuria, no dysuria, no frequency, no straining. No incontinence. No fevers, no chills, no nausea, no vomiting, no flank pain. \par \par 06/21/2022: Patient presents for follow up. He reports  symptoms have recurred to level prior to TURP, with weak stream, frequency, urgency, nocturia.

## 2022-06-21 NOTE — ASSESSMENT
[FreeTextEntry1] : 70 yo M s/p TURP, now with frequency, urgency. Recommended cystoscopy to evaluate bladder neck to r/o regrowth of prostate vs BNC. Discussed trial of alpha blocker again. Pt would like to try.  \par \par For complex renal cyst, recommend surveillance with Renal US. Recent CT showed calcifications and septations in 6.8 cm cyst, but no apparent enhancement though was PO/IV single phase CT.

## 2022-06-22 ENCOUNTER — OUTPATIENT (OUTPATIENT)
Dept: OUTPATIENT SERVICES | Facility: HOSPITAL | Age: 72
LOS: 1 days | End: 2022-06-22
Payer: COMMERCIAL

## 2022-06-22 ENCOUNTER — APPOINTMENT (OUTPATIENT)
Dept: ULTRASOUND IMAGING | Facility: CLINIC | Age: 72
End: 2022-06-22
Payer: MEDICARE

## 2022-06-22 DIAGNOSIS — Z98.1 ARTHRODESIS STATUS: Chronic | ICD-10-CM

## 2022-06-22 DIAGNOSIS — Z98.890 OTHER SPECIFIED POSTPROCEDURAL STATES: Chronic | ICD-10-CM

## 2022-06-22 DIAGNOSIS — Z41.9 ENCOUNTER FOR PROCEDURE FOR PURPOSES OTHER THAN REMEDYING HEALTH STATE, UNSPECIFIED: Chronic | ICD-10-CM

## 2022-06-22 DIAGNOSIS — N28.1 CYST OF KIDNEY, ACQUIRED: ICD-10-CM

## 2022-06-22 PROCEDURE — 76775 US EXAM ABDO BACK WALL LIM: CPT | Mod: 26

## 2022-06-22 PROCEDURE — 76775 US EXAM ABDO BACK WALL LIM: CPT

## 2022-06-28 ENCOUNTER — TRANSCRIPTION ENCOUNTER (OUTPATIENT)
Age: 72
End: 2022-06-28

## 2022-07-12 ENCOUNTER — TRANSCRIPTION ENCOUNTER (OUTPATIENT)
Age: 72
End: 2022-07-12

## 2022-07-20 ENCOUNTER — OUTPATIENT (OUTPATIENT)
Dept: OUTPATIENT SERVICES | Facility: HOSPITAL | Age: 72
LOS: 1 days | End: 2022-07-20
Payer: MEDICARE

## 2022-07-20 DIAGNOSIS — Z98.1 ARTHRODESIS STATUS: Chronic | ICD-10-CM

## 2022-07-20 DIAGNOSIS — Z01.818 ENCOUNTER FOR OTHER PREPROCEDURAL EXAMINATION: ICD-10-CM

## 2022-07-20 DIAGNOSIS — Z41.9 ENCOUNTER FOR PROCEDURE FOR PURPOSES OTHER THAN REMEDYING HEALTH STATE, UNSPECIFIED: Chronic | ICD-10-CM

## 2022-07-20 DIAGNOSIS — K40.90 UNILATERAL INGUINAL HERNIA, WITHOUT OBSTRUCTION OR GANGRENE, NOT SPECIFIED AS RECURRENT: ICD-10-CM

## 2022-07-20 DIAGNOSIS — Z98.890 OTHER SPECIFIED POSTPROCEDURAL STATES: Chronic | ICD-10-CM

## 2022-07-20 LAB
ANION GAP SERPL CALC-SCNC: 3 MMOL/L — LOW (ref 5–17)
BASOPHILS # BLD AUTO: 0.1 K/UL — SIGNIFICANT CHANGE UP (ref 0–0.2)
BASOPHILS NFR BLD AUTO: 1.2 % — SIGNIFICANT CHANGE UP (ref 0–2)
BUN SERPL-MCNC: 16 MG/DL — SIGNIFICANT CHANGE UP (ref 7–23)
CALCIUM SERPL-MCNC: 9.7 MG/DL — SIGNIFICANT CHANGE UP (ref 8.5–10.1)
CHLORIDE SERPL-SCNC: 106 MMOL/L — SIGNIFICANT CHANGE UP (ref 96–108)
CO2 SERPL-SCNC: 28 MMOL/L — SIGNIFICANT CHANGE UP (ref 22–31)
CREAT SERPL-MCNC: 1.02 MG/DL — SIGNIFICANT CHANGE UP (ref 0.5–1.3)
EGFR: 79 ML/MIN/1.73M2 — SIGNIFICANT CHANGE UP
EOSINOPHIL # BLD AUTO: 0.18 K/UL — SIGNIFICANT CHANGE UP (ref 0–0.5)
EOSINOPHIL NFR BLD AUTO: 2.2 % — SIGNIFICANT CHANGE UP (ref 0–6)
GLUCOSE SERPL-MCNC: 95 MG/DL — SIGNIFICANT CHANGE UP (ref 70–99)
HCT VFR BLD CALC: 45.5 % — SIGNIFICANT CHANGE UP (ref 39–50)
HGB BLD-MCNC: 14.9 G/DL — SIGNIFICANT CHANGE UP (ref 13–17)
IMM GRANULOCYTES NFR BLD AUTO: 0.9 % — SIGNIFICANT CHANGE UP (ref 0–1.5)
LYMPHOCYTES # BLD AUTO: 1.67 K/UL — SIGNIFICANT CHANGE UP (ref 1–3.3)
LYMPHOCYTES # BLD AUTO: 20.8 % — SIGNIFICANT CHANGE UP (ref 13–44)
MCHC RBC-ENTMCNC: 30.8 PG — SIGNIFICANT CHANGE UP (ref 27–34)
MCHC RBC-ENTMCNC: 32.7 GM/DL — SIGNIFICANT CHANGE UP (ref 32–36)
MCV RBC AUTO: 94 FL — SIGNIFICANT CHANGE UP (ref 80–100)
MONOCYTES # BLD AUTO: 0.64 K/UL — SIGNIFICANT CHANGE UP (ref 0–0.9)
MONOCYTES NFR BLD AUTO: 8 % — SIGNIFICANT CHANGE UP (ref 2–14)
MRSA PCR RESULT.: SIGNIFICANT CHANGE UP
NEUTROPHILS # BLD AUTO: 5.36 K/UL — SIGNIFICANT CHANGE UP (ref 1.8–7.4)
NEUTROPHILS NFR BLD AUTO: 66.9 % — SIGNIFICANT CHANGE UP (ref 43–77)
PLATELET # BLD AUTO: 211 K/UL — SIGNIFICANT CHANGE UP (ref 150–400)
POTASSIUM SERPL-MCNC: 3.9 MMOL/L — SIGNIFICANT CHANGE UP (ref 3.5–5.3)
POTASSIUM SERPL-SCNC: 3.9 MMOL/L — SIGNIFICANT CHANGE UP (ref 3.5–5.3)
RBC # BLD: 4.84 M/UL — SIGNIFICANT CHANGE UP (ref 4.2–5.8)
RBC # FLD: 13.6 % — SIGNIFICANT CHANGE UP (ref 10.3–14.5)
S AUREUS DNA NOSE QL NAA+PROBE: SIGNIFICANT CHANGE UP
SODIUM SERPL-SCNC: 137 MMOL/L — SIGNIFICANT CHANGE UP (ref 135–145)
WBC # BLD: 8.02 K/UL — SIGNIFICANT CHANGE UP (ref 3.8–10.5)
WBC # FLD AUTO: 8.02 K/UL — SIGNIFICANT CHANGE UP (ref 3.8–10.5)

## 2022-07-20 PROCEDURE — 85025 COMPLETE CBC W/AUTO DIFF WBC: CPT

## 2022-07-20 PROCEDURE — 36415 COLL VENOUS BLD VENIPUNCTURE: CPT

## 2022-07-20 PROCEDURE — 87640 STAPH A DNA AMP PROBE: CPT

## 2022-07-20 PROCEDURE — 87641 MR-STAPH DNA AMP PROBE: CPT

## 2022-07-20 PROCEDURE — 80048 BASIC METABOLIC PNL TOTAL CA: CPT

## 2022-07-20 NOTE — ASU PATIENT PROFILE, ADULT - NSICDXPASTMEDICALHX_GEN_ALL_CORE_FT
PAST MEDICAL HISTORY:  Anxiety disorder     Back pain     Bladder neck stenosis, congenital     BPH (benign prostatic hyperplasia)     Bundle branch block RBBB    Cataract Early    Constipation     Cough variant asthma     Depression     Diverticulosis     Frozen shoulder left    GERD (gastroesophageal reflux disease)     H/O bursitis Both shoulders    Hiatal hernia     History of narrow angle glaucoma     Hypothyroid     IBS (irritable bowel syndrome) IBS-C    Lumbar disc herniation     Lumbar radiculopathy     Mitral stenosis     PVCs (premature ventricular contractions)     Renal cyst left  and right

## 2022-07-20 NOTE — ASU PATIENT PROFILE, ADULT - FALL HARM RISK - UNIVERSAL INTERVENTIONS
Bed in lowest position, wheels locked, appropriate side rails in place/Call bell, personal items and telephone in reach/Instruct patient to call for assistance before getting out of bed or chair/Non-slip footwear when patient is out of bed/[place] to call system/Physically safe environment - no spills, clutter or unnecessary equipment/Purposeful Proactive Rounding/Room/bathroom lighting operational, light cord in reach
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
## 2022-07-20 NOTE — ASU PATIENT PROFILE, ADULT - NSICDXPASTSURGICALHX_GEN_ALL_CORE_FT
PAST SURGICAL HISTORY:  Elective surgery lumbar PEREZ x 2    H/O endoscopy 08/2019    H/O right inguinal hernia repair 2008    History of cystoscopy TURP---8/28/2019    S/P colonoscopy 2021    S/P lumbar spinal fusion 2018

## 2022-07-21 DIAGNOSIS — K40.90 UNILATERAL INGUINAL HERNIA, WITHOUT OBSTRUCTION OR GANGRENE, NOT SPECIFIED AS RECURRENT: ICD-10-CM

## 2022-07-21 DIAGNOSIS — Z01.818 ENCOUNTER FOR OTHER PREPROCEDURAL EXAMINATION: ICD-10-CM

## 2022-07-25 ENCOUNTER — TRANSCRIPTION ENCOUNTER (OUTPATIENT)
Age: 72
End: 2022-07-25

## 2022-07-25 ENCOUNTER — RESULT REVIEW (OUTPATIENT)
Age: 72
End: 2022-07-25

## 2022-07-25 ENCOUNTER — OUTPATIENT (OUTPATIENT)
Dept: INPATIENT UNIT | Facility: HOSPITAL | Age: 72
LOS: 1 days | Discharge: HOME | End: 2022-07-25
Payer: MEDICARE

## 2022-07-25 VITALS
RESPIRATION RATE: 17 BRPM | HEART RATE: 78 BPM | TEMPERATURE: 98 F | SYSTOLIC BLOOD PRESSURE: 123 MMHG | DIASTOLIC BLOOD PRESSURE: 69 MMHG | OXYGEN SATURATION: 100 %

## 2022-07-25 VITALS
WEIGHT: 184.97 LBS | RESPIRATION RATE: 15 BRPM | HEART RATE: 74 BPM | HEIGHT: 71 IN | DIASTOLIC BLOOD PRESSURE: 74 MMHG | TEMPERATURE: 97 F | SYSTOLIC BLOOD PRESSURE: 136 MMHG | OXYGEN SATURATION: 98 %

## 2022-07-25 DIAGNOSIS — Z98.890 OTHER SPECIFIED POSTPROCEDURAL STATES: Chronic | ICD-10-CM

## 2022-07-25 DIAGNOSIS — K40.90 UNILATERAL INGUINAL HERNIA, WITHOUT OBSTRUCTION OR GANGRENE, NOT SPECIFIED AS RECURRENT: ICD-10-CM

## 2022-07-25 DIAGNOSIS — Z98.1 ARTHRODESIS STATUS: Chronic | ICD-10-CM

## 2022-07-25 DIAGNOSIS — D17.6 BENIGN LIPOMATOUS NEOPLASM OF SPERMATIC CORD: ICD-10-CM

## 2022-07-25 DIAGNOSIS — Z41.9 ENCOUNTER FOR PROCEDURE FOR PURPOSES OTHER THAN REMEDYING HEALTH STATE, UNSPECIFIED: Chronic | ICD-10-CM

## 2022-07-25 PROCEDURE — 88304 TISSUE EXAM BY PATHOLOGIST: CPT

## 2022-07-25 PROCEDURE — 88304 TISSUE EXAM BY PATHOLOGIST: CPT | Mod: 26

## 2022-07-25 PROCEDURE — C1781: CPT

## 2022-07-25 RX ORDER — SODIUM CHLORIDE 9 MG/ML
1000 INJECTION, SOLUTION INTRAVENOUS
Refills: 0 | Status: DISCONTINUED | OUTPATIENT
Start: 2022-07-25 | End: 2022-07-25

## 2022-07-25 RX ORDER — ONDANSETRON 8 MG/1
4 TABLET, FILM COATED ORAL ONCE
Refills: 0 | Status: DISCONTINUED | OUTPATIENT
Start: 2022-07-25 | End: 2022-07-25

## 2022-07-25 RX ORDER — OXYCODONE HYDROCHLORIDE 5 MG/1
5 TABLET ORAL ONCE
Refills: 0 | Status: DISCONTINUED | OUTPATIENT
Start: 2022-07-25 | End: 2022-07-25

## 2022-07-25 RX ORDER — IBUPROFEN 200 MG
1 TABLET ORAL
Qty: 0 | Refills: 0 | DISCHARGE

## 2022-07-25 RX ORDER — FENTANYL CITRATE 50 UG/ML
50 INJECTION INTRAVENOUS
Refills: 0 | Status: DISCONTINUED | OUTPATIENT
Start: 2022-07-25 | End: 2022-07-25

## 2022-07-25 RX ORDER — IBUPROFEN 200 MG
800 TABLET ORAL ONCE
Refills: 0 | Status: COMPLETED | OUTPATIENT
Start: 2022-07-25 | End: 2022-07-25

## 2022-07-25 RX ADMIN — Medication 800 MILLIGRAM(S): at 11:01

## 2022-07-25 NOTE — ASU DISCHARGE PLAN (ADULT/PEDIATRIC) - CARE PROVIDER_API CALL
Huang Dalal)  Surgery; Surgical Critical Care  [address], [address]  [place] [address]  Phone: [phone]  Fax: [phone]  Follow Up Time: 2 weeks

## 2022-07-25 NOTE — ASU DISCHARGE PLAN (ADULT/PEDIATRIC) - ASU DC SPECIAL INSTRUCTIONSFT
- Please remove the dressing in POD 2 .  - Keep stristrips in place.  - Wash the wound with soap and water daily and stristrips will fall by themselves.  - Please come for follow up with Dr. Dalal in two weeks.  - No heavy lifting more than 10 Ib for 8 weeks.  - Please use overcontour pain medications as needed.

## 2022-07-25 NOTE — ASU DISCHARGE PLAN (ADULT/PEDIATRIC) - NS MD DC FALL RISK RISK
For information on Fall & Injury Prevention, visit: [email or web address] OR  [email or web address] OR  https://www.cdc.gov/steadi/patient.html

## 2022-07-25 NOTE — ASU DISCHARGE PLAN (ADULT/PEDIATRIC) - DATE OF LAST VACCINATION
27-Mar-2021 Attending MDM: Well appearing almost 4y/o with asthma presenting with fever and cough, initially brought in as code sepsis due to tachycardia and fever being disproportionate. using albuterol at home. On exam patient febrile, tachycardic, with belly breathing. Will obtain Chest X-Ray to eval for PNA as well as r/o cardiomegaly given fever with reports of abdominal pain. Antipyretic. Will also obtain EKG to screen for ST changes suggestive of myocarditis. PO challenge, reassess.

## 2022-07-25 NOTE — BRIEF OPERATIVE NOTE - NSICDXBRIEFPROCEDURE_GEN_ALL_CORE_FT
PROCEDURES:  Repair, hernia, inguinal, open, using mesh, adult 25-Jul-2022 09:33:23  Levi Maldonado

## 2022-07-25 NOTE — ASU PATIENT PROFILE, ADULT - FALL HARM RISK - UNIVERSAL INTERVENTIONS
Bed in lowest position, wheels locked, appropriate side rails in place/Call bell, personal items and telephone in reach/Instruct patient to call for assistance before getting out of bed or chair/Non-slip footwear when patient is out of bed/[place] to call system/Physically safe environment - no spills, clutter or unnecessary equipment/Purposeful Proactive Rounding/Room/bathroom lighting operational, light cord in reach

## 2022-07-28 DIAGNOSIS — I45.10 UNSPECIFIED RIGHT BUNDLE-BRANCH BLOCK: ICD-10-CM

## 2022-07-28 DIAGNOSIS — Z88.0 ALLERGY STATUS TO PENICILLIN: ICD-10-CM

## 2022-07-28 DIAGNOSIS — Z88.5 ALLERGY STATUS TO NARCOTIC AGENT: ICD-10-CM

## 2022-07-28 DIAGNOSIS — E03.9 HYPOTHYROIDISM, UNSPECIFIED: ICD-10-CM

## 2022-07-28 DIAGNOSIS — K40.30 UNILATERAL INGUINAL HERNIA, WITH OBSTRUCTION, WITHOUT GANGRENE, NOT SPECIFIED AS RECURRENT: ICD-10-CM

## 2022-07-28 DIAGNOSIS — M19.90 UNSPECIFIED OSTEOARTHRITIS, UNSPECIFIED SITE: ICD-10-CM

## 2022-07-28 DIAGNOSIS — N40.0 BENIGN PROSTATIC HYPERPLASIA WITHOUT LOWER URINARY TRACT SYMPTOMS: ICD-10-CM

## 2022-07-28 DIAGNOSIS — F41.9 ANXIETY DISORDER, UNSPECIFIED: ICD-10-CM

## 2022-07-28 DIAGNOSIS — K21.9 GASTRO-ESOPHAGEAL REFLUX DISEASE WITHOUT ESOPHAGITIS: ICD-10-CM

## 2022-07-28 DIAGNOSIS — J45.909 UNSPECIFIED ASTHMA, UNCOMPLICATED: ICD-10-CM

## 2022-08-11 ENCOUNTER — NON-APPOINTMENT (OUTPATIENT)
Age: 72
End: 2022-08-11

## 2022-08-11 ENCOUNTER — APPOINTMENT (OUTPATIENT)
Dept: DERMATOLOGY | Facility: CLINIC | Age: 72
End: 2022-08-11

## 2022-08-11 DIAGNOSIS — T14.8XXA OTHER INJURY OF UNSPECIFIED BODY REGION, INITIAL ENCOUNTER: ICD-10-CM

## 2022-08-11 DIAGNOSIS — R60.9 EDEMA, UNSPECIFIED: ICD-10-CM

## 2022-08-11 PROBLEM — Z86.69 PERSONAL HISTORY OF OTHER DISEASES OF THE NERVOUS SYSTEM AND SENSE ORGANS: Chronic | Status: ACTIVE | Noted: 2022-07-20

## 2022-08-11 PROBLEM — Z87.39 PERSONAL HISTORY OF OTHER DISEASES OF THE MUSCULOSKELETAL SYSTEM AND CONNECTIVE TISSUE: Chronic | Status: ACTIVE | Noted: 2022-07-20

## 2022-08-11 PROBLEM — K58.9 IRRITABLE BOWEL SYNDROME, UNSPECIFIED: Chronic | Status: ACTIVE | Noted: 2022-07-20

## 2022-08-11 PROBLEM — H26.9 UNSPECIFIED CATARACT: Chronic | Status: ACTIVE | Noted: 2019-08-20

## 2022-08-11 PROBLEM — Q64.31: Chronic | Status: ACTIVE | Noted: 2022-07-20

## 2022-08-11 PROBLEM — I45.4 NONSPECIFIC INTRAVENTRICULAR BLOCK: Chronic | Status: ACTIVE | Noted: 2018-08-28

## 2022-08-11 PROBLEM — K57.90 DIVERTICULOSIS OF INTESTINE, PART UNSPECIFIED, WITHOUT PERFORATION OR ABSCESS WITHOUT BLEEDING: Chronic | Status: ACTIVE | Noted: 2022-07-20

## 2022-08-11 PROBLEM — J45.991 COUGH VARIANT ASTHMA: Chronic | Status: ACTIVE | Noted: 2022-07-20

## 2022-08-11 PROBLEM — K58.9 IRRITABLE BOWEL SYNDROME WITHOUT DIARRHEA: Chronic | Status: ACTIVE | Noted: 2022-07-20

## 2022-08-11 PROCEDURE — 99213 OFFICE O/P EST LOW 20 MIN: CPT

## 2022-08-26 ENCOUNTER — NON-APPOINTMENT (OUTPATIENT)
Age: 72
End: 2022-08-26

## 2022-09-29 ENCOUNTER — APPOINTMENT (OUTPATIENT)
Dept: DERMATOLOGY | Facility: CLINIC | Age: 72
End: 2022-09-29

## 2022-10-03 ENCOUNTER — TRANSCRIPTION ENCOUNTER (OUTPATIENT)
Age: 72
End: 2022-10-03

## 2022-10-25 ENCOUNTER — APPOINTMENT (OUTPATIENT)
Dept: DERMATOLOGY | Facility: CLINIC | Age: 72
End: 2022-10-25

## 2022-10-25 PROCEDURE — 99213 OFFICE O/P EST LOW 20 MIN: CPT

## 2022-10-25 RX ORDER — CLINDAMYCIN HYDROCHLORIDE 150 MG/1
150 CAPSULE ORAL
Qty: 12 | Refills: 0 | Status: DISCONTINUED | COMMUNITY
Start: 2020-09-08 | End: 2022-10-25

## 2022-10-25 RX ORDER — DOXYCYCLINE 100 MG/1
100 TABLET, FILM COATED ORAL
Qty: 14 | Refills: 0 | Status: DISCONTINUED | COMMUNITY
Start: 2022-08-11 | End: 2022-10-25

## 2022-10-25 NOTE — ASSESSMENT
[FreeTextEntry1] : Folliculitis\par education - failed tx with topical clindamycin.\par Doxy 100mg bid.\par Witchhazel astringent bid.\par f/u in 2 weeks.

## 2022-10-25 NOTE — PHYSICAL EXAM
[Alert] : alert [Oriented x 3] : ~L oriented x 3 [Well Nourished] : well nourished [FreeTextEntry3] : Follicular erythematous papules of the right > left buttocks, with two small pustules.

## 2022-11-08 ENCOUNTER — APPOINTMENT (OUTPATIENT)
Dept: DERMATOLOGY | Facility: CLINIC | Age: 72
End: 2022-11-08

## 2022-11-08 PROCEDURE — 99213 OFFICE O/P EST LOW 20 MIN: CPT

## 2022-11-08 NOTE — ASSESSMENT
[FreeTextEntry1] : Folliculitis\par Education.\par Complete course of doxy.\par Then clindamycin solution bid.\par Discomfort likely from residual inflammation - slowly resolving.\par Witchhazel astringent daily as needed.\par Lever-2000 cleanser daily as needed.

## 2022-11-08 NOTE — HISTORY OF PRESENT ILLNESS
[FreeTextEntry1] : Folliculitis of the buttocks. [de-identified] : Improved, but not resolved.\par

## 2022-11-08 NOTE — PHYSICAL EXAM
[Alert] : alert [Oriented x 3] : ~L oriented x 3 [Well Nourished] : well nourished [FreeTextEntry3] : Trace non-blanching erythematous barely elevated papules, x 3 - right buttocks.

## 2022-11-09 ENCOUNTER — TRANSCRIPTION ENCOUNTER (OUTPATIENT)
Age: 72
End: 2022-11-09

## 2022-11-21 ENCOUNTER — RESULT CHARGE (OUTPATIENT)
Age: 72
End: 2022-11-21

## 2022-11-22 ENCOUNTER — APPOINTMENT (OUTPATIENT)
Dept: INTERNAL MEDICINE | Facility: CLINIC | Age: 72
End: 2022-11-22

## 2022-11-22 ENCOUNTER — NON-APPOINTMENT (OUTPATIENT)
Age: 72
End: 2022-11-22

## 2022-11-22 VITALS
DIASTOLIC BLOOD PRESSURE: 68 MMHG | WEIGHT: 182.98 LBS | BODY MASS INDEX: 25.62 KG/M2 | SYSTOLIC BLOOD PRESSURE: 104 MMHG | OXYGEN SATURATION: 96 % | TEMPERATURE: 98 F | RESPIRATION RATE: 16 BRPM | HEART RATE: 75 BPM | HEIGHT: 71 IN

## 2022-11-22 PROCEDURE — 99214 OFFICE O/P EST MOD 30 MIN: CPT | Mod: 25

## 2022-11-22 PROCEDURE — 94060 EVALUATION OF WHEEZING: CPT

## 2022-11-22 RX ORDER — MUPIROCIN 20 MG/G
2 OINTMENT TOPICAL
Qty: 1 | Refills: 1 | Status: DISCONTINUED | COMMUNITY
Start: 2022-08-11 | End: 2022-11-22

## 2022-11-22 RX ORDER — DOXYCYCLINE HYCLATE 100 MG/1
100 CAPSULE ORAL TWICE DAILY
Qty: 30 | Refills: 1 | Status: DISCONTINUED | COMMUNITY
Start: 2022-10-25 | End: 2022-11-22

## 2022-11-22 RX ORDER — PHENAZOPYRIDINE HYDROCHLORIDE 200 MG/1
200 TABLET ORAL 3 TIMES DAILY
Qty: 21 | Refills: 1 | Status: DISCONTINUED | COMMUNITY
Start: 2019-09-03 | End: 2022-11-22

## 2022-11-22 RX ORDER — TAMSULOSIN HYDROCHLORIDE 0.4 MG/1
CAPSULE ORAL
Refills: 0 | Status: DISCONTINUED | COMMUNITY
End: 2022-11-22

## 2022-11-22 RX ORDER — CLONAZEPAM 1 MG/1
1 TABLET ORAL
Qty: 60 | Refills: 0 | Status: DISCONTINUED | COMMUNITY
Start: 2017-11-14 | End: 2022-11-22

## 2022-11-22 RX ORDER — HYDROCODONE BITARTRATE AND HOMATROPINE METHYLBROMIDE 5; 1.5 MG/5ML; MG/5ML
5-1.5 SYRUP ORAL
Qty: 300 | Refills: 0 | Status: DISCONTINUED | COMMUNITY
Start: 2021-02-26 | End: 2022-11-22

## 2022-11-22 RX ORDER — CLINDAMYCIN PHOSPHATE 10 MG/ML
1 SOLUTION TOPICAL TWICE DAILY
Qty: 1 | Refills: 2 | Status: DISCONTINUED | COMMUNITY
Start: 2022-11-09 | End: 2022-11-22

## 2022-11-22 RX ORDER — FLUTICASONE PROPIONATE 50 UG/1
50 SPRAY, METERED NASAL DAILY
Qty: 3 | Refills: 3 | Status: DISCONTINUED | COMMUNITY
Start: 2019-10-01 | End: 2022-11-22

## 2022-11-22 RX ORDER — HYDROCODONE BITARTRATE AND HOMATROPINE METHYLBROMIDE 5; 1.5 MG/5ML; MG/5ML
5-1.5 SYRUP ORAL
Qty: 300 | Refills: 0 | Status: DISCONTINUED | COMMUNITY
Start: 2022-04-04 | End: 2022-11-22

## 2022-11-22 RX ORDER — FLURANDRENOLIDE 4 UG/CM2
4 TAPE TOPICAL
Qty: 1 | Refills: 2 | Status: DISCONTINUED | COMMUNITY
Start: 2019-12-10 | End: 2022-11-22

## 2022-11-22 RX ORDER — NITROFURANTOIN (MONOHYDRATE/MACROCRYSTALS) 25; 75 MG/1; MG/1
100 CAPSULE ORAL
Qty: 14 | Refills: 0 | Status: DISCONTINUED | COMMUNITY
Start: 2019-07-25 | End: 2022-11-22

## 2022-11-22 NOTE — DATA REVIEWED
[FreeTextEntry1] : Spirometric analysis is within normal limits.  Bronchodilator reactivity is demonstrated.

## 2022-11-22 NOTE — PLAN
[FreeTextEntry1] : 1.  Continue with medical regimen as outlined above.\par \par 2.  Maintain use of the AeroChamber.\par \par 3.  Cardiovascular exercise regimen has been recommended\par \par 4.  Follow-up in 6 months with full pulmonary function testing.\par \par 5.  Routine medical follow-up with his primary care physician, Dr. Baptiste.

## 2022-11-22 NOTE — HISTORY OF PRESENT ILLNESS
[FreeTextEntry1] : The patient comes in today for a routine pulmonary followup evaluation.\par  [de-identified] : The patient states, that from a pulmonary standpoint, he continues to do well.  He remains compliant with his Flovent.  He we have now decrease this down to 2 puffs once a day instead of twice daily.  He has not had any exacerbations.  He does occasionally have a nocturnal cough.  He notes that the AeroChamber has helped with reducing some of his side effects.  \par \par The patient did not have any allergic type of issues during the fall change in season.  He denies rhinitis or watery eyes.  There are no acute pulmonary issues at present.  He has not been exercising.  He now comes in for this assessment

## 2022-12-06 ENCOUNTER — RX RENEWAL (OUTPATIENT)
Age: 72
End: 2022-12-06

## 2022-12-21 RX ORDER — HYDROCODONE BITARTRATE AND HOMATROPINE METHYLBROMIDE 1.5; 5 MG/5ML; MG/5ML
5-1.5 SOLUTION ORAL
Qty: 300 | Refills: 0 | Status: ACTIVE | COMMUNITY
Start: 2022-12-21 | End: 1900-01-01

## 2022-12-22 ENCOUNTER — NON-APPOINTMENT (OUTPATIENT)
Age: 72
End: 2022-12-22

## 2023-01-17 ENCOUNTER — APPOINTMENT (OUTPATIENT)
Dept: DERMATOLOGY | Facility: CLINIC | Age: 73
End: 2023-01-17
Payer: MEDICARE

## 2023-01-17 PROCEDURE — 99213 OFFICE O/P EST LOW 20 MIN: CPT

## 2023-01-17 NOTE — ASSESSMENT
[FreeTextEntry1] : Complete skin examination is negative for malignancy; Multiple new concerns were addressed and discussed.\par Therapeutic options and their risks and benefits; along with multiple diagnostic possibilities were discussed at length;\par risks and benefits of skin biopsy and/or other further study were discussed;\par \par Continue regular exams; Follow up for TBSE in 1 year

## 2023-01-17 NOTE — PHYSICAL EXAM
[Full Body Skin Exam Performed] : performed [FreeTextEntry3] : Skin examination performed of the face, neck, trunk, arms, legs; \par The patient is well, alert and oriented, pleasant and cooperative.\par Eyelids, conjunctivae, oral mucosa, digits and nails all normal.  \par No cervical adenopathy.\par \par Normal findings include:\par \par Scaling waxy stuck on papules; multiple on UEs, larger macular lesions on LEs; \par Angiomas\par + lentigines and solar damage are present in sun exposed areas; \par \par No lesions were suspicious for malignancy. \par \par

## 2023-01-17 NOTE — HISTORY OF PRESENT ILLNESS
[de-identified] : Pt. presents for skin check;\par c/o few spots of concern;  \par Severity:  mild  \par Modifying factors:  none\par Associated symptoms:  none\par Context:  no association with activity\par

## 2023-02-09 ENCOUNTER — APPOINTMENT (OUTPATIENT)
Dept: DERMATOLOGY | Facility: CLINIC | Age: 73
End: 2023-02-09

## 2023-03-28 ENCOUNTER — OFFICE (OUTPATIENT)
Dept: URBAN - METROPOLITAN AREA CLINIC 102 | Facility: CLINIC | Age: 73
Setting detail: OPHTHALMOLOGY
End: 2023-03-28
Payer: MEDICARE

## 2023-03-28 DIAGNOSIS — H01.005: ICD-10-CM

## 2023-03-28 DIAGNOSIS — H16.221: ICD-10-CM

## 2023-03-28 DIAGNOSIS — H25.13: ICD-10-CM

## 2023-03-28 DIAGNOSIS — H01.004: ICD-10-CM

## 2023-03-28 DIAGNOSIS — H16.223: ICD-10-CM

## 2023-03-28 DIAGNOSIS — H01.002: ICD-10-CM

## 2023-03-28 DIAGNOSIS — H43.393: ICD-10-CM

## 2023-03-28 DIAGNOSIS — H16.222: ICD-10-CM

## 2023-03-28 DIAGNOSIS — H01.001: ICD-10-CM

## 2023-03-28 DIAGNOSIS — H40.033: ICD-10-CM

## 2023-03-28 PROCEDURE — 92014 COMPRE OPH EXAM EST PT 1/>: CPT | Performed by: OPHTHALMOLOGY

## 2023-03-28 PROCEDURE — 92020 GONIOSCOPY: CPT | Performed by: OPHTHALMOLOGY

## 2023-03-28 ASSESSMENT — REFRACTION_MANIFEST
OD_CYLINDER: -1.75
OD_SPHERE: -0.25
OS_SPHERE: -0.50
OS_CYLINDER: -2.75
OS_CYLINDER: -2.75
OD_CYLINDER: -2.00
OD_VA1: 20/20
OD_SPHERE: -0.25
OS_AXIS: 085
OD_AXIS: 100
OS_ADD: +2.50
OS_VA1: 20/20
OD_ADD: +2.50
OD_AXIS: 100
OD_ADD: +2.50
OS_AXIS: 085
OS_ADD: +2.50
OS_SPHERE: -0.25

## 2023-03-28 ASSESSMENT — REFRACTION_CURRENTRX
OS_SPHERE: -0.25
OD_CYLINDER: -1.75
OD_CYLINDER: -2.00
OS_CYLINDER: -2.75
OD_VPRISM_DIRECTION: PROGS
OD_AXIS: 97
OD_VPRISM_DIRECTION: PROGS
OD_AXIS: 100
OD_OVR_VA: 20/
OS_AXIS: 79
OS_VPRISM_DIRECTION: PROGS
OS_AXIS: 085
OD_ADD: +2.25
OD_OVR_VA: 20/
OS_OVR_VA: 20/
OD_SPHERE: -0.25
OS_SPHERE: -1.50
OS_CYLINDER: -2.75
OD_ADD: +2.50
OD_SPHERE: -1.00
OS_ADD: +2.50
OS_ADD: +2.25
OS_OVR_VA: 20/
OS_VPRISM_DIRECTION: PROGS

## 2023-03-28 ASSESSMENT — KERATOMETRY
OD_K1POWER_DIOPTERS: 42.50
OS_AXISANGLE_DEGREES: 153
METHOD_AUTO_MANUAL: AUTO
OD_K2POWER_DIOPTERS: 43.75
OS_K2POWER_DIOPTERS: 44.25
OS_K1POWER_DIOPTERS: 42.25
OD_AXISANGLE_DEGREES: 003

## 2023-03-28 ASSESSMENT — SPHEQUIV_DERIVED
OD_SPHEQUIV: -1.25
OS_SPHEQUIV: -1.875
OS_SPHEQUIV: -1.375
OD_SPHEQUIV: -1.125
OS_SPHEQUIV: -1.625
OD_SPHEQUIV: -1

## 2023-03-28 ASSESSMENT — REFRACTION_AUTOREFRACTION
OS_CYLINDER: -3.25
OS_SPHERE: +0.25
OD_CYLINDER: -2.50
OD_SPHERE: +0.25
OS_AXIS: 079
OD_AXIS: 097

## 2023-03-28 ASSESSMENT — DECREASING TEAR LAKE - SEVERITY SCORE
OD_DEC_TEARLAKE: 2+
OS_DEC_TEARLAKE: 2+

## 2023-03-28 ASSESSMENT — CONFRONTATIONAL VISUAL FIELD TEST (CVF)
OD_FINDINGS: FULL
OS_FINDINGS: FULL

## 2023-03-28 ASSESSMENT — AXIALLENGTH_DERIVED
OS_AL: 24.3398
OD_AL: 24.2336
OS_AL: 24.2366
OS_AL: 24.4439
OD_AL: 24.1824
OD_AL: 24.1313

## 2023-03-28 ASSESSMENT — TONOMETRY
OD_IOP_MMHG: 13
OS_IOP_MMHG: 11

## 2023-03-28 ASSESSMENT — VISUAL ACUITY
OD_BCVA: 20/20-1
OS_BCVA: 20/20-1

## 2023-03-28 ASSESSMENT — LID EXAM ASSESSMENTS
OS_BLEPHARITIS: LLL LUL 2+
OD_BLEPHARITIS: RLL RUL 2+

## 2023-03-28 ASSESSMENT — LID POSITION - DERMATOCHALASIS
OD_DERMATOCHALASIS: 1+ 2+
OS_DERMATOCHALASIS: 1+ 2+

## 2023-03-30 RX ORDER — ECONAZOLE NITRATE 10 MG/G
1 CREAM TOPICAL
Qty: 85 | Refills: 2 | Status: ACTIVE | COMMUNITY
Start: 2018-08-20 | End: 1900-01-01

## 2023-04-05 ENCOUNTER — APPOINTMENT (OUTPATIENT)
Dept: OTOLARYNGOLOGY | Facility: CLINIC | Age: 73
End: 2023-04-05
Payer: MEDICARE

## 2023-04-05 VITALS — WEIGHT: 180 LBS | TEMPERATURE: 96.4 F | HEIGHT: 71 IN | BODY MASS INDEX: 25.2 KG/M2

## 2023-04-05 DIAGNOSIS — J34.2 DEVIATED NASAL SEPTUM: ICD-10-CM

## 2023-04-05 PROCEDURE — 99213 OFFICE O/P EST LOW 20 MIN: CPT | Mod: 25

## 2023-04-05 PROCEDURE — 31575 DIAGNOSTIC LARYNGOSCOPY: CPT

## 2023-04-05 NOTE — PROCEDURE
[FreeTextEntry6] : Indication for procedure:  Unable to adequately examine mid and posterior nasal cavity with anterior rhinoscopy\par The patient has rt nasal obstruction\par \par Sinus endoscope # 86\par Nasal septum exam shows septal deviation to the rt at valve 3 plus\par The inferior nasal turbinates are moderately hypertrophic in size bilaterally.\par The sinus endoscope was introduced into the right nares\par exam right middle meatus reveals no mucopus or inflammation.  The right middle turbinate is WNL.\par The scope was advanced and the sphenoethmoid region was inspected.\par The superior meatus, superior turbinate and nasal vault are unremarkable.  The nasopharynx is unremarkable without inflammation or mass.\par The sinus endoscope was introduced into the left nares and\par exam left middle meatus reveals no mucopus or inflammation.  The left middle turbinate is WNL.\par The scope was advanced and the sphenoethmoid region was inspected.\par The left superior meatus and nasal vault are unremarkable.\par The fiberoptic scope was introduced to the posterior pharynx and exam of the endolarynx is wnl w good vocal cord mobility.\par No lesion noted in hypopharynx.  There is no erythema or edema of the posterior larynx to suggest reflux.\par

## 2023-04-05 NOTE — ASSESSMENT
[FreeTextEntry1] : deviated septum w some obstruction\par rec saline rinses\par gerd without laryngeal signs\par cough using codeine cough prn

## 2023-04-05 NOTE — HISTORY OF PRESENT ILLNESS
[de-identified] : fu re cough\par using hydrocodon prn w some relief\par asthma w cough-using flovent\par gerd and hiatal hernia

## 2023-04-25 ENCOUNTER — RX RENEWAL (OUTPATIENT)
Age: 73
End: 2023-04-25

## 2023-05-24 NOTE — PHYSICAL EXAM
PATIENT PRESENTS TO ER WITH COMPLAINT OF NAUSEA AND VOMITING    [Well Nourished] : well nourished [Well-Appearing] : well-appearing [Normal Oropharynx] : the oropharynx was normal [No JVD] : no jugular venous distention [Supple] : supple [Clear to Auscultation] : lungs were clear to auscultation bilaterally [Regular Rhythm] : with a regular rhythm [Normal S1, S2] : normal S1 and S2 [No Edema] : there was no peripheral edema [No Extremity Clubbing/Cyanosis] : no extremity clubbing/cyanosis [Soft] : abdomen soft [Normal Bowel Sounds] : normal bowel sounds [Normal Supraclavicular Nodes] : no supraclavicular lymphadenopathy [Normal Posterior Cervical Nodes] : no posterior cervical lymphadenopathy [Normal Anterior Cervical Nodes] : no anterior cervical lymphadenopathy [No CVA Tenderness] : no CVA  tenderness [No Rash] : no rash [de-identified] : There is a slight prolongation of expiratory phase.

## 2023-06-13 ENCOUNTER — APPOINTMENT (OUTPATIENT)
Dept: INTERNAL MEDICINE | Facility: CLINIC | Age: 73
End: 2023-06-13
Payer: MEDICARE

## 2023-06-13 VITALS
HEIGHT: 71 IN | RESPIRATION RATE: 14 BRPM | SYSTOLIC BLOOD PRESSURE: 115 MMHG | HEART RATE: 77 BPM | OXYGEN SATURATION: 96 % | DIASTOLIC BLOOD PRESSURE: 76 MMHG | WEIGHT: 180 LBS | BODY MASS INDEX: 25.2 KG/M2 | TEMPERATURE: 98 F

## 2023-06-13 VITALS
OXYGEN SATURATION: 96 % | SYSTOLIC BLOOD PRESSURE: 115 MMHG | TEMPERATURE: 98 F | RESPIRATION RATE: 14 BRPM | DIASTOLIC BLOOD PRESSURE: 76 MMHG | HEART RATE: 77 BPM | HEIGHT: 71 IN | WEIGHT: 180 LBS | BODY MASS INDEX: 25.2 KG/M2

## 2023-06-13 DIAGNOSIS — R05.3 CHRONIC COUGH: ICD-10-CM

## 2023-06-13 DIAGNOSIS — F41.9 ANXIETY DISORDER, UNSPECIFIED: ICD-10-CM

## 2023-06-13 PROCEDURE — 94727 GAS DIL/WSHOT DETER LNG VOL: CPT

## 2023-06-13 PROCEDURE — 94729 DIFFUSING CAPACITY: CPT

## 2023-06-13 PROCEDURE — ZZZZZ: CPT

## 2023-06-13 PROCEDURE — 99213 OFFICE O/P EST LOW 20 MIN: CPT | Mod: 25

## 2023-06-13 PROCEDURE — 99215 OFFICE O/P EST HI 40 MIN: CPT | Mod: 25

## 2023-06-13 PROCEDURE — 94060 EVALUATION OF WHEEZING: CPT

## 2023-06-13 RX ORDER — FLUTICASONE PROPIONATE 220 UG/1
220 AEROSOL, METERED RESPIRATORY (INHALATION)
Refills: 0 | Status: DISCONTINUED | COMMUNITY
End: 2023-06-13

## 2023-06-13 RX ORDER — DOXYCYCLINE HYCLATE 100 MG/1
100 TABLET ORAL
Qty: 14 | Refills: 0 | Status: DISCONTINUED | COMMUNITY
Start: 2023-05-12 | End: 2023-06-13

## 2023-06-13 RX ORDER — BUPROPION HYDROCHLORIDE 300 MG/1
300 TABLET, EXTENDED RELEASE ORAL DAILY
Refills: 0 | Status: ACTIVE | COMMUNITY
Start: 2018-04-30

## 2023-06-13 NOTE — ADDENDUM
[FreeTextEntry1] : I, Jesus Murphy, documented this note as a scribe on behalf of Dr. Javon Gambino MD on 06/13/2023.

## 2023-06-13 NOTE — HISTORY OF PRESENT ILLNESS
[FreeTextEntry1] : The patient comes in for a yearly pulmonary evaluation.  [de-identified] : The patient feels relatively well at this time. He has airway hyperreactivity and is maintained on Flovent 220 mcg/act taking 2 puffs once per day with an Aerochamber. His dosage was cut down from taking 2 puffs BID to 2 puffs once per day at a prior visit. He complains of hoarseness from the Flovent, although it has gotten better since reducing the dosage and instituting the AeroChamber. He denies any cough, wheeze or SOB. He was not exercising due to injuries, although he exercises somewhat now.  His exercise capacity is fair.\par \par He has seasonal allergies and is not taking any OTC medications for this issue. He denies any rhinitis, post nasal drip or itchy/watery eyes. \par \par The patient denies any other constitutional symptoms at this time. He now comes in for this assessment.

## 2023-06-13 NOTE — PHYSICAL EXAM
[Well Nourished] : well nourished [Well-Appearing] : well-appearing [Normal Oropharynx] : the oropharynx was normal [No JVD] : no jugular venous distention [Supple] : supple [Clear to Auscultation] : lungs were clear to auscultation bilaterally [Regular Rhythm] : with a regular rhythm [Normal S1, S2] : normal S1 and S2 [No Edema] : there was no peripheral edema [No Extremity Clubbing/Cyanosis] : no extremity clubbing/cyanosis [Soft] : abdomen soft [Normal Bowel Sounds] : normal bowel sounds [No CVA Tenderness] : no CVA  tenderness [No Rash] : no rash [de-identified] : There is a slight prolongation of expiratory phase.  A minimal end expiratory wheezes noted at the end of a forced maneuver.

## 2023-06-13 NOTE — PLAN
[FreeTextEntry1] : 1. Continue current medications as previously outlined. \par \par 2. Routine follow-up with PCP, Dr. Baptiste for all other medical needs. \par \par 3. Stressed importance of cardiovascular exercise. \par \par 4. Maintain usage of Aerochamber device with Flovent to reduce hoarseness. \par \par 5. Follow-up in 6 months for office visit with pre post spirometry and FeNO

## 2023-07-18 ENCOUNTER — RX RENEWAL (OUTPATIENT)
Age: 73
End: 2023-07-18

## 2023-07-26 ENCOUNTER — APPOINTMENT (OUTPATIENT)
Dept: GASTROENTEROLOGY | Facility: CLINIC | Age: 73
End: 2023-07-26
Payer: MEDICARE

## 2023-07-26 VITALS
DIASTOLIC BLOOD PRESSURE: 78 MMHG | WEIGHT: 174 LBS | HEART RATE: 73 BPM | BODY MASS INDEX: 24.36 KG/M2 | SYSTOLIC BLOOD PRESSURE: 118 MMHG | HEIGHT: 71 IN

## 2023-07-26 DIAGNOSIS — K21.9 GASTRO-ESOPHAGEAL REFLUX DISEASE W/OUT ESOPHAGITIS: ICD-10-CM

## 2023-07-26 DIAGNOSIS — Z12.11 ENCOUNTER FOR SCREENING FOR MALIGNANT NEOPLASM OF COLON: ICD-10-CM

## 2023-07-26 PROCEDURE — 99204 OFFICE O/P NEW MOD 45 MIN: CPT

## 2023-08-03 NOTE — ASSESSMENT
[FreeTextEntry1] : 73 year old man with hypothyroidism, here for reflux.   Will start pantoprazole 40 mg orally daily.  Anti reflux measures given.  As ongoing reflux for many years, will plan for endoscopy.

## 2023-08-03 NOTE — HISTORY OF PRESENT ILLNESS
[FreeTextEntry1] : 73 year old man with hypothyroidism, here for reflux.   Patient states he has post prandial reflux manifested as chest burning. Lasts 30 minutes. Take meds but has breakthrough symptoms. Denies any dysphagia, odynophagia. No weight loss. No overt signs of Gi bleeding like hematemesis, melena, hematochezia. No post prandial pain. No weight loss. No abdominal pain. Had egd 4 years ago, showed a hiatal hernia.

## 2023-08-03 NOTE — PHYSICAL EXAM
[Normal Voice/Communication] : normal voice/communication [Alert] : alert [Healthy Appearing] : healthy appearing [No Acute Distress] : no acute distress [Sclera] : the sclera and conjunctiva were normal [Hearing Threshold Finger Rub Not Dane] : hearing was normal [Normal Lips/Gums] : the lips and gums were normal [Oropharynx] : the oropharynx was normal [Normal Appearance] : the appearance of the neck was normal [No Neck Mass] : no neck mass was observed [Abdomen Soft] : soft [No Clubbing, Cyanosis] : no clubbing or cyanosis of the fingernails [No Joint Swelling] : no joint swelling seen [Normal Color / Pigmentation] : normal skin color and pigmentation [Skin Lesions] : no skin lesions [] : no rash [No Focal Deficits] : no focal deficits [Motor Exam] : the motor exam was normal [Oriented To Time, Place, And Person] : oriented to person, place, and time

## 2023-09-05 ENCOUNTER — EMERGENCY (EMERGENCY)
Facility: HOSPITAL | Age: 73
LOS: 0 days | Discharge: HOME | End: 2023-09-06
Attending: EMERGENCY MEDICINE
Payer: MEDICARE

## 2023-09-05 VITALS
DIASTOLIC BLOOD PRESSURE: 85 MMHG | OXYGEN SATURATION: 99 % | RESPIRATION RATE: 18 BRPM | TEMPERATURE: 98 F | HEIGHT: 71 IN | WEIGHT: 169.98 LBS | HEART RATE: 97 BPM | SYSTOLIC BLOOD PRESSURE: 135 MMHG

## 2023-09-05 DIAGNOSIS — Z41.9 ENCOUNTER FOR PROCEDURE FOR PURPOSES OTHER THAN REMEDYING HEALTH STATE, UNSPECIFIED: Chronic | ICD-10-CM

## 2023-09-05 DIAGNOSIS — R00.2 PALPITATIONS: ICD-10-CM

## 2023-09-05 DIAGNOSIS — Z98.890 OTHER SPECIFIED POSTPROCEDURAL STATES: Chronic | ICD-10-CM

## 2023-09-05 DIAGNOSIS — K58.9 IRRITABLE BOWEL SYNDROME WITHOUT DIARRHEA: ICD-10-CM

## 2023-09-05 DIAGNOSIS — Z88.0 ALLERGY STATUS TO PENICILLIN: ICD-10-CM

## 2023-09-05 DIAGNOSIS — F32.A DEPRESSION, UNSPECIFIED: ICD-10-CM

## 2023-09-05 DIAGNOSIS — F41.9 ANXIETY DISORDER, UNSPECIFIED: ICD-10-CM

## 2023-09-05 DIAGNOSIS — Z98.1 ARTHRODESIS STATUS: Chronic | ICD-10-CM

## 2023-09-05 DIAGNOSIS — I45.10 UNSPECIFIED RIGHT BUNDLE-BRANCH BLOCK: ICD-10-CM

## 2023-09-05 DIAGNOSIS — K21.9 GASTRO-ESOPHAGEAL REFLUX DISEASE WITHOUT ESOPHAGITIS: ICD-10-CM

## 2023-09-05 DIAGNOSIS — N40.0 BENIGN PROSTATIC HYPERPLASIA WITHOUT LOWER URINARY TRACT SYMPTOMS: ICD-10-CM

## 2023-09-05 DIAGNOSIS — E03.9 HYPOTHYROIDISM, UNSPECIFIED: ICD-10-CM

## 2023-09-05 DIAGNOSIS — Z88.1 ALLERGY STATUS TO OTHER ANTIBIOTIC AGENTS STATUS: ICD-10-CM

## 2023-09-05 LAB
BASOPHILS # BLD AUTO: 0.09 K/UL — SIGNIFICANT CHANGE UP (ref 0–0.2)
BASOPHILS NFR BLD AUTO: 1 % — SIGNIFICANT CHANGE UP (ref 0–2)
EOSINOPHIL # BLD AUTO: 0.16 K/UL — SIGNIFICANT CHANGE UP (ref 0–0.5)
EOSINOPHIL NFR BLD AUTO: 1.8 % — SIGNIFICANT CHANGE UP (ref 0–6)
HCT VFR BLD CALC: 45.1 % — SIGNIFICANT CHANGE UP (ref 39–50)
HGB BLD-MCNC: 15.1 G/DL — SIGNIFICANT CHANGE UP (ref 13–17)
IMM GRANULOCYTES NFR BLD AUTO: 0.7 % — SIGNIFICANT CHANGE UP (ref 0–0.9)
LYMPHOCYTES # BLD AUTO: 1.27 K/UL — SIGNIFICANT CHANGE UP (ref 1–3.3)
LYMPHOCYTES # BLD AUTO: 14 % — SIGNIFICANT CHANGE UP (ref 13–44)
MCHC RBC-ENTMCNC: 31.9 PG — SIGNIFICANT CHANGE UP (ref 27–34)
MCHC RBC-ENTMCNC: 33.5 GM/DL — SIGNIFICANT CHANGE UP (ref 32–36)
MCV RBC AUTO: 95.3 FL — SIGNIFICANT CHANGE UP (ref 80–100)
MONOCYTES # BLD AUTO: 0.76 K/UL — SIGNIFICANT CHANGE UP (ref 0–0.9)
MONOCYTES NFR BLD AUTO: 8.4 % — SIGNIFICANT CHANGE UP (ref 2–14)
NEUTROPHILS # BLD AUTO: 6.71 K/UL — SIGNIFICANT CHANGE UP (ref 1.8–7.4)
NEUTROPHILS NFR BLD AUTO: 74.1 % — SIGNIFICANT CHANGE UP (ref 43–77)
PLATELET # BLD AUTO: 210 K/UL — SIGNIFICANT CHANGE UP (ref 150–400)
RBC # BLD: 4.73 M/UL — SIGNIFICANT CHANGE UP (ref 4.2–5.8)
RBC # FLD: 13.6 % — SIGNIFICANT CHANGE UP (ref 10.3–14.5)
WBC # BLD: 9.05 K/UL — SIGNIFICANT CHANGE UP (ref 3.8–10.5)
WBC # FLD AUTO: 9.05 K/UL — SIGNIFICANT CHANGE UP (ref 3.8–10.5)

## 2023-09-05 PROCEDURE — 84484 ASSAY OF TROPONIN QUANT: CPT

## 2023-09-05 PROCEDURE — 36415 COLL VENOUS BLD VENIPUNCTURE: CPT

## 2023-09-05 PROCEDURE — 84443 ASSAY THYROID STIM HORMONE: CPT

## 2023-09-05 PROCEDURE — 99283 EMERGENCY DEPT VISIT LOW MDM: CPT | Mod: 25

## 2023-09-05 PROCEDURE — 80048 BASIC METABOLIC PNL TOTAL CA: CPT

## 2023-09-05 PROCEDURE — 85025 COMPLETE CBC W/AUTO DIFF WBC: CPT

## 2023-09-05 PROCEDURE — 93010 ELECTROCARDIOGRAM REPORT: CPT

## 2023-09-05 PROCEDURE — 99285 EMERGENCY DEPT VISIT HI MDM: CPT

## 2023-09-05 PROCEDURE — 93005 ELECTROCARDIOGRAM TRACING: CPT

## 2023-09-05 NOTE — ED ADULT TRIAGE NOTE - CHIEF COMPLAINT QUOTE
Pt BIBEMS from home c/o palpitations. Pt was taking out his garbage and got an alert on his apple watch that he had a high heart rate in the 170s. EMS arrived and patient was in SVT. SVT broke using vagal maneuvers. Pt endorsed "pressure in chest" during episode. Pt denies current cp, sob, lightheadedness, n/v/d

## 2023-09-05 NOTE — ED ADULT NURSE NOTE - OBJECTIVE STATEMENT
pt present to ED via EMS he felt SOB after taking out garbage at home, apple watch displayed heart rate 160.

## 2023-09-05 NOTE — ED ADULT NURSE NOTE - NS ED NOTE ABUSE RESPONSE YN
----- Message from Zhao Leggett. Tigre Contreras sent at 8/25/2020 11:10 AM EDT -----  Regarding: Prescription Question  Contact: [phone]  Dr Theodore I need to get a refill on my Tramadol. I don't see it on my list of medications. Also, could you please give me a brief statement on your letterhead stating that due to my anxiety and depression that it would be helpful for me to  have a  animal. I live alone and my condition has gotten worse due to my financial situation and isolation. I'd like to have the information available to [facility] if they question it.  Thank you
Yes

## 2023-09-05 NOTE — ED ADULT NURSE NOTE - NSFALLUNIVINTERV_ED_ALL_ED
Bed/Stretcher in lowest position, wheels locked, appropriate side rails in place/Call bell, personal items and telephone in reach/Instruct patient to call for assistance before getting out of bed/chair/stretcher/Non-slip footwear applied when patient is off stretcher/[place] to call system/Physically safe environment - no spills, clutter or unnecessary equipment/Purposeful proactive rounding/Room/bathroom lighting operational, light cord in reach

## 2023-09-06 VITALS
TEMPERATURE: 98 F | RESPIRATION RATE: 20 BRPM | OXYGEN SATURATION: 97 % | HEART RATE: 95 BPM | SYSTOLIC BLOOD PRESSURE: 113 MMHG | DIASTOLIC BLOOD PRESSURE: 74 MMHG

## 2023-09-06 LAB
ANION GAP SERPL CALC-SCNC: 3 MMOL/L — LOW (ref 5–17)
BUN SERPL-MCNC: 17 MG/DL — SIGNIFICANT CHANGE UP (ref 7–23)
CALCIUM SERPL-MCNC: 9.6 MG/DL — SIGNIFICANT CHANGE UP (ref 8.5–10.1)
CHLORIDE SERPL-SCNC: 109 MMOL/L — HIGH (ref 96–108)
CO2 SERPL-SCNC: 29 MMOL/L — SIGNIFICANT CHANGE UP (ref 22–31)
CREAT SERPL-MCNC: 1.26 MG/DL — SIGNIFICANT CHANGE UP (ref 0.5–1.3)
EGFR: 61 ML/MIN/1.73M2 — SIGNIFICANT CHANGE UP
GLUCOSE SERPL-MCNC: 137 MG/DL — HIGH (ref 70–99)
POTASSIUM SERPL-MCNC: 4.3 MMOL/L — SIGNIFICANT CHANGE UP (ref 3.5–5.3)
POTASSIUM SERPL-SCNC: 4.3 MMOL/L — SIGNIFICANT CHANGE UP (ref 3.5–5.3)
SODIUM SERPL-SCNC: 141 MMOL/L — SIGNIFICANT CHANGE UP (ref 135–145)
TROPONIN I, HIGH SENSITIVITY RESULT: 6.22 NG/L — SIGNIFICANT CHANGE UP
TSH SERPL-MCNC: 1.59 UU/ML — SIGNIFICANT CHANGE UP (ref 0.34–4.82)

## 2023-09-06 NOTE — ED PROVIDER NOTE - PATIENT PORTAL LINK FT
You can access the FollowMyHealth Patient Portal offered by [facility] by registering at the following website: [email or web address]. By joining [facility]’s FollowMyHealth portal, you will also be able to view your health information using other applications (apps) compatible with our system.

## 2023-09-06 NOTE — ED PROVIDER NOTE - OBJECTIVE STATEMENT
Patient presents to ED after he noticed heart rate of 160 while he was taking out the garbage.  According to EMS he was in SVT he blew in his jaw and his symptoms resolved at my evaluation patient denies any chest pain or shortness of breath.  No abdominal pain.  No headache.  No blurry vision.  No motor or sensory deficits.  No recent changes in medication regimen.  Denies any drug abuse.  No other acute issues symptoms or concerns.

## 2023-09-06 NOTE — ED PROVIDER NOTE - CLINICAL SUMMARY MEDICAL DECISION MAKING FREE TEXT BOX
I reviewed the rhythm strip that EMS obtained likely SVT appeared regular resolved with blowing and straw he has no chest pain or shortness of breath he has follow-up with Dr. Baptiste he had a normal nuclear stress test a year ago his labs are normal he is to follow-up with his PCP and cardiologist Dr. Baptiste return to ED for intractable chest pain or shortness of breath patient and his wife at bedside agree to plan of care

## 2023-09-06 NOTE — ED PROVIDER NOTE - CROS ED ENMT ALL NEG
CARDIOVASCULAR OFFICE NOTE    Patient: Jose M Joe Date: 3/25/2022   YOB: 1951    70 year old male      CHIEF COMPLAINT:   No chief complaint on file.      HISTORY OF PRESENT ILLNESS:    Jose M Joe is a 70 year old male with a medical history of CAD with cardiac cath on 8/8/2019 which revealed severe ISR of prior proximal LAD and LCx stent s/p PTCA to proximal LAD and Circumflex and then multiple simultaneous kissing inflation of the ostial LAD and LCx into the distal LM with 3.5 mm NC balloons. He presents for a routine follow up visit.     Today patient reports ***    Past Medical History:   Diagnosis Date   • Anxiety    • Arthritis    • Bronchitis    • CAD (coronary artery disease)    • Chronic pain     Bilateral legs   • Claudication (CMS/HCC)    • Colon polyp 10/30/2020    Dr. Boyd Last colonoscopy 10/29/2020 Tubular Sara repeat in 3 years.   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    Resection   • Pneumonia    • S/P ascending aortic aneurysm repair 9/23/2014   • Sinusitis, chronic    • SOB (shortness of breath)    • Stomach ulcer     x2   • Type II diabetes mellitus (CMS/HCC)     Does not check blood sugars at home - Daily Metformin (was always told he was only pre-diabetic)       Past Surgical History:   Procedure Laterality Date   • Cardiac catherization  02/03/2016    Left main coronary artery disease with failed LIMA/FELA bypass   • Cardiac catherization  9/18/2014   • Cardiac catherization  07/25/2019    Abnormal stress test likely due to  in-stent restenosis if the proximal LAD/Continue aggressive medical management/Future PTCA needed   • Cardiac catherization  03/02/2017    CAD with ISR of the mLAD successful angioplasty   • Cardiac surgery  09/23/2014    cabg, AVR repair and ascending aorta graft, tumor removal off of aorta   • Colonoscopy  07/25/2016    Area of ulceration & inflammation proximal transverse colon. Suspected site identified on recent CT scan. Multiple biopsies obtained, area also injected with tattoo ink. (Dr. ANJANA Boyd)   • Colonoscopy  06/28/2018   • Colonoscopy w biopsy  10/29/2020    Four colon polyps removed (Dr. Alvaro Boyd)   • Colonoscopy w/ polypectomy  1/5/16    dr guzman   • Coronary angioplasty with stent placement  07/14/2006    3.5x12mm paclitaxel stent to mid LAD and 3.5x12mm paclitaxel stent to proximal LAD   • Coronary angioplasty with stent placement  12/27/2006    3.0x24mm Taxus stent just distal to previously placed stents in LAD, and 3.5x24mm Taxus stent placed proximally to the first stent   • Coronary angioplasty with stent placement  03/03/2016    Stents X 4 (Failed CABG)   • Esophagogastroduodenoscopy  1/5/16    dr guzman   • Esophagogastroduodenoscopy  02/16/2016    dr guzman   • Eye surgery Right 04/17/2017    Cataract Extraction with IOL Implant   • Eye surgery Left 04/24/2017    Cataract Extraction with IOL Implant   • Joint replacement Left 03/02/2020    Left total hip arthroplasty via anterior approach (Dr. Eliot Trujillo)   • Ptca angioplasty single major coronary artery or branch N/A 8/8/2019    PTCA only (no stents) performed by Marci Banegas MD at [facility] CARDIAC CATH LABS   • Stress test  8/2014       ALLERGIES:   Allergen Reactions   • Ticagrelor SHORTNESS OF BREATH     Dyspnea at times, feeling like patient is suffocating       Current Medications    ALBUTEROL 108 (90 BASE) MCG/ACT INHALER    Inhale 2 puffs into the lungs every 4 hours as needed for Shortness of Breath or Wheezing.    ASCORBIC ACID (VITAMIN  C PO)    Take 500 mg by mouth 2 times daily.     ASPIRIN 81 MG EC TABLET    Take 1 tablet by mouth daily.    ATORVASTATIN (LIPITOR) 40 MG TABLET    Take 1 tablet by mouth every other day.    B COMPLEX VITAMINS (VITAMIN B COMPLEX PO)    Take 1 tablet by mouth daily.     BIOTIN 30366 MCG TAB    Take 1,000 mcg by mouth daily.    CELECOXIB (CELEBREX) 200 MG CAPSULE    Take 200 mg by mouth 2 times daily.    CHOLECALCIFEROL (VITAMIN D3 PO)    Take 1 tablet by mouth daily. Indications: pt unsure of dose    CLOPIDOGREL (PLAVIX) 75 MG TABLET    Take 1 tablet by mouth daily.    COLCHICINE (COLCRYS) 0.6 MG TABLET    Take 1 tablet by mouth daily.    HYDROCHLOROTHIAZIDE (HYDRODIURIL) 25 MG TABLET    Take 1 tablet by mouth daily.    IPRATROPIUM (ATROVENT) 0.02 % NEBULIZER SOLUTION    Take 2.5 mLs by nebulization 4 times daily.    MAGNESIUM 250 MG TABLET    Take 1 tablet by mouth daily.    METFORMIN (GLUCOPHAGE-XR) 500 MG 24 HR TABLET    TAKE 1 TABLET ONCE DAILY   WITH BREAKFAST    METHOCARBAMOL (ROBAXIN) 500 MG TABLET    Take 1 tablet by mouth 4 times daily.    METOPROLOL SUCCINATE (TOPROL-XL) 25 MG 24 HR TABLET    Take 1 tablet by mouth daily.    MULTIPLE VITAMINS-MINERALS (MULTIVITAMIN & MINERAL PO)    Take 1 capsule by mouth daily.    NALOXEGOL OXALATE (MOVANTIK) 25 MG TAB    Take 1 tablet by mouth daily.    NALOXONE (NARCAN) 4 MG/0.1ML NASAL SPRAY    Spray the content of 1 device into 1 nostril. Call 911. May repeat with 2nd device in alternate nostril if no response in 2-3 minutes.    NITROGLYCERIN (NITROSTAT) 0.4 MG SUBLINGUAL TABLET    Place 1 tablet under the tongue every 5 minutes as needed for Chest pain.    OMEGA-3 FATTY ACIDS (FISH OIL) 1000 MG CAPSULE    Take 1 g by mouth daily.    OXYCODONE-ACETAMINOPHEN (PERCOCET) 7.5-325 MG PER TABLET    Take 1 tablet by mouth every 6 hours as needed for Pain.    POLYETHYLENE GLYCOL (MIRALAX) 17 G PACKET    Take 17 g by mouth daily as needed. Stir and dissolve powder in any 4 to 8  ounces of beverage, then drink.    SENNOSIDES-DOCUSATE SODIUM (SENOKOT-S) 8.6-50 MG TABLET    Take 1 tablet by mouth daily.    TRAZODONE (DESYREL) 50 MG TABLET    TAKE 1 TO 2 TABLETS NIGHTLYAS NEEDED FOR SLEEP    ZINC SULFATE PO    Take 1 tablet by mouth daily.       Social History     Tobacco Use   Smoking Status Former Smoker   • Packs/day: 2.00   • Years: 40.00   • Pack years: 80.00   • Types: Cigarettes   • Quit date: 2012   • Years since quittin.2   Smokeless Tobacco Never Used   Tobacco Comment    VAPES, uses E-cig occasionally       Social History     Substance and Sexual Activity   Alcohol Use Yes   • Alcohol/week: 20.0 standard drinks   • Types: 20 Cans of beer per week    Comment: none lately d/t fall/ fx       Family History   Problem Relation Age of Onset   • Dementia/Alzheimers Mother    • Diabetes Mother    • Heart disease Father    • Myocardial Infarction Father    • Diabetes Brother    • Clotting Disorder Son         PE   • COPD Brother    • COPD Brother    • Diabetes Brother    • COPD Brother        REVIEW OF SYSTEMS:  A 12 point ROS was done and is noncontributory unless otherwise stated in the HPI.    PHYSICAL EXAM:   There were no vitals filed for this visit.    Constitutional: Well developed, well nourished 70 year old male in no apparent distress.  Skin: Warm, dry and intact.  HEENT: Normocephalic, atraumatic. Mucous membranes moist, EOMs intact. No masses or lesions.  Neck: Supple, trachea midline, negative JVD or HJR at 45 degrees, negative carotid bruits bilaterally.  Cardiovascular: S1, S2 regular rate and rhythm.No murmur. No thrill.  Respiratory:  Anterior/posterior lung sounds clear to auscultation bilaterally. Normal respiratory effort.  Musculoskeletal/Extremities: CRT <3, noclubbing, no cyanosis, no edema.  Psych: Mood and affect appropriate to situation  Neuro: No focal deficits. Sensation equal bilaterally. Normal tone and extraocular movements  intact.    INVESTIGATIONS:  Lab Results   Component Value Date    SODIUM 135 01/07/2022    POTASSIUM 4.3 01/08/2022    BUN 16 01/07/2022    CREATININE 0.50 (L) 01/07/2022    WBC 11.3 (H) 01/10/2022    HCT 37.1 (L) 01/10/2022    HGB 12.2 (L) 01/10/2022    INR 1.0 01/03/2022    CPK 81 01/01/2022    PTT 27 02/23/2017    GLUCOSE 99 01/07/2022    TSH 0.971 08/20/2014    BNP 56 03/02/2017    CHOLESTEROL 145 05/06/2021    HDL 53 05/06/2021    CALCLDL 64 05/06/2021    TRIGLYCERIDE 139 05/06/2021    MG 2.3 01/02/2022     Surgery 9/23/2014  PROCEDURE PERFORMED:  1.)  Ascending aortic aneurysm resection with graft and aortic root remodeling (28 mm hemishield dacron aortic graft)  2.)  Aortic valve repair  3.)  Coronary artery bypass graft x 2, left internal mammary artery to OM1 and right internal mammary to to LAD   4.)  Resection of intracardiac tumor (likely fibroelastoma from aortic valve)   5.)  endoscopic vein harvest  6.)  bilateral internal mammary artery harvest  7.) sternal plating.     NM Stress Test 5/11/2016  Small perfusion abnormality in the anteroseptal area that improves with stress and with significant improvement from the previous abnormal stress test in 01/2016. No regional wall motion abnormality. Normal left ventricular systolic function. TID normal.     US LIZZETTE Rest and Stress Bilateral 7/15/2016  Normal resting ankle-brachial index of the lower extremities.      Echo 5/11/2016  Low Normal LV systolic function  Trace to Mild AR  Trace to MR     LIZZETTE 7/15/2016  Normal resting ankle-brachial index of the lower extremities.     Coronary Angiogram 3/2/2017   1. ISR of the mid LAD stent status post angioplasty with Angiosculpt balloon with excellent angiographic results.     CTA ABD Aorta Bilat Iliofem 5/3/3017  1. 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  2. Scattered atherosclerotic disease within the aorta and bilateral lower extremity arteries with no  evidence of a hemodynamically significant stenosis or occlusion. Patent three-vessel runoff bilaterally.  3. Low attenuation existing lytic lesion within the superior aspect of the spleen, measuring 1.5 cm, increased in size since February 2017 where it measured 1.0 cm. Recommend further evaluation with an MRI of the abdomen with contrast.  4. Sigmoid diverticulosis with no evidence of diverticulitis.  5. Emphysematous changes in the bilateral lung bases.     NM stress test 7/17/2019  Myocardial perfusion scan is positive for moderate sized perfusion abnormality that is partially reversible in the lateral and anteroapical wall.  There is also fixed defect in the inferior wall that improves with stress. Mild reduction in left ventricular systolic function. Hypokinesis in the lateral and inferior wall. Risk is moderate. Clinical correlation is advised. TID ratio is 1.14.     Echo 7/17/2019  Normal LV systolic function  Abnormal septal motion consistent with conduction abnormality. thinned apex  Mildly dilated ascending aorta.     Coronary angiogram 7/25/2019  · Left main is free of significant disease.  · Proximal LAD with 2 layers of stents, 50-60% ISR. Unable to pass IVUS catheter. MLA of ostial LAD was 5.8.  · Ost LCx to Mid LCx lesion with 60% in-stent restenosis.  · RCA with minimal luminal irregularities.  · The ejection fraction is estimated to be 60%.  · There is no aortic valve stenosis.  .  ASSESSMENT:  1.  Abnormal stress test likely due to in-stent restenosis if the proximal LAD.  2.  ISR of the ostial to proximal LCx is also present.  3.  Unable to pass IVUS catheter beyond region of ISR in the LAD.  4.  MLA of LAD at the ostium was 5.8.     PLAN:  1.  Continue aggressive medical management.  2.  Return in 1-4 weeks for percutaneous balloon angioplasty of the proximal LAD and LCx        Cardiac cath 8/8/2019   · Dist LM to Ost LAD lesion with 70% in-stent restenosis.  · Ost Cx to Prox Cx lesion with 70%  in-stent restenosis.     1. Severe ISR of prior proximal LAD and LCx stent s/p PTCA to proximal LAD and Circumflex and then multiple simultaneous kissing inflation of the ostial LAD and LCx into the distal LM with 3.5 mm NC balloons. Good angiographic results with VICTORIA 3 flow.    NM Stress Test 12/22/2020  Myocardial perfusion scan shows moderate size partially reversible perfusion abnormality in the lateral wall and mild partially reversible moderate size perfusion abnormality in the anterior wall with hypokinesis in that region.  This is suspicious of ischemia similar to his previous stress tests.  Clinical correlation is advised.  Cardiac risk is low to moderate.    Echo 12/22/2020 (EF 53%)   Low Normal LV systolic function  Wall motion as in diagram (unchanged from previous echo )  Aortic valve sclerosis without stenosis. Trace aortic valve regurgitation.  No tricuspid valve stenosis. Trace tricuspid valve regurgitation.  Mildly dilated ascending aorta.    Coronary Angiogram 2/2/2021  · LM is a long normal caliber vessel and is angiographically normal.  · LAD is a normal caliber type III vessel with previously placed stents in the distal LM to mid LAD. Moderate proximal LAD ISR. Severe focal mid LAD ISR.  · LCx is a normal caliber vessel with previously placed stents in the distal LM into mid LCx. Moderate ostial to mid LCx ISR.  · RCA is a normal caliber dominant vessel with mild diffuse disease.  · The ejection fraction is estimated to be 55%. Mid anterior wall is dyskinetic.  · S/p PTCA of LAD and LCx with kissing balloon inflation.  · Mid LAD lesion with 95% stenosis.     INTERVENTION:  6F R CFA access, 45cm sheath. XB 3.5 guide.  BMW wires placed in LAD and LCx.  PTCA of LAD using a 3.5 x 15mm NC balloon up to 20 kenisha.  IVUS of LAD performed. MLA after PTCA was 5.2 in mid LAD. Area of focal severe ISR remained in mid LAD.  IVUS of LCx performed. PTCA using a 3.0mm NC balloon up to 16 kenisha.  PTCA of ostial LAD  and ostial circumflex arteries with Kissing balloon inflation of LAD and LCx using two (2) 3.0mm NC balloons up to 14 kenisha.  PTCA of focal severe mid LAD ISR using a [place] cutting balloon. Successful lesion expansion at 18 kenisha.     ASSESSMENT:  1. Abnormal stress test, SOB and fatigue due to areas of severe ISR in the LAD and LCx.  2. S/p PTCA of LAD and LCx with successful treatment of in-stent restenosis.  3. LVEF 55%, with mid anterior wall dyskinesis vs. Aneurysm.     PLAN:  1. Continue DAPT. Additional 300mg Plavix given.  2. Start Colchicine 0.6mg BID.  3. Sheath pull per protocol.  4. Likely D/C in AM.     ASSESSMENT:   No diagnosis found.    PLAN:  Coronary Artery Disease: s/p CABGx 2 (LIMA -> OM and FELA-LAD) 9/2014 by Dr. Mccurdy,  ballooning to the Prox LAD to Mid LAD lesion, and s/p severe ISR of prior proximal LAD and LCx stent s/p PTCA to proximal LAD and Circumflex and then multiple simultaneous kissing inflation of the ostial LAD and LCx into the distal LM with 3.5 mm NC balloons (8/2019), and most recently s/p S/p PTCA of LAD and LCx with successful treatment of in-stent restenosis (2/2021). Remains asymptomatic. Continue current medication regimen.   S/P aortic valve repair and Ascending Aortic Aneurysm repair (9/2014) by Dr. Mccurdy.   Hypertension: Well controlled, BP ***. Continue current medication regimen including HCTZ 25 mg daily and Toprol-XL 25 mg daily.  Hyperlipidemia: LDL 64, , Total Chol 145 (5/6/2021). On atorvastatin 40 mg every other day. Advised to maintain heart healthy diet.  Prediabetes: A1C 5.5% as of 11/2021. On metformin. Follows with PCP. Advised low carb diet.   Joint Pain: Followed with Dr. Decker (rheumatology) in the past.   Moderate SANDHYA: Advised to remain compliant with CPAP.     No follow-ups on file. Call or return to clinic as needed if these symptoms worsen, fail to improve as anticipated, or if new symptoms develop.     On 3/25/2022, Shauna HENRIQUEZ  scribed the services personally performed by Marci Banegas MD       negative...

## 2023-09-14 ENCOUNTER — NON-APPOINTMENT (OUTPATIENT)
Age: 73
End: 2023-09-14

## 2023-09-18 ENCOUNTER — APPOINTMENT (OUTPATIENT)
Dept: OTOLARYNGOLOGY | Facility: CLINIC | Age: 73
End: 2023-09-18
Payer: MEDICARE

## 2023-09-18 VITALS — BODY MASS INDEX: 24.08 KG/M2 | WEIGHT: 172 LBS | HEIGHT: 71 IN

## 2023-09-18 DIAGNOSIS — J02.9 ACUTE PHARYNGITIS, UNSPECIFIED: ICD-10-CM

## 2023-09-18 PROCEDURE — 31575 DIAGNOSTIC LARYNGOSCOPY: CPT

## 2023-09-18 PROCEDURE — 99213 OFFICE O/P EST LOW 20 MIN: CPT | Mod: 25

## 2023-09-19 ENCOUNTER — APPOINTMENT (OUTPATIENT)
Dept: INTERNAL MEDICINE | Facility: CLINIC | Age: 73
End: 2023-09-19

## 2023-09-28 ENCOUNTER — APPOINTMENT (OUTPATIENT)
Dept: ELECTROPHYSIOLOGY | Facility: CLINIC | Age: 73
End: 2023-09-28
Payer: MEDICARE

## 2023-09-28 ENCOUNTER — NON-APPOINTMENT (OUTPATIENT)
Age: 73
End: 2023-09-28

## 2023-09-28 VITALS
DIASTOLIC BLOOD PRESSURE: 66 MMHG | WEIGHT: 172 LBS | RESPIRATION RATE: 16 BRPM | OXYGEN SATURATION: 99 % | BODY MASS INDEX: 24.08 KG/M2 | HEIGHT: 71 IN | SYSTOLIC BLOOD PRESSURE: 97 MMHG | HEART RATE: 70 BPM

## 2023-09-28 PROCEDURE — 99205 OFFICE O/P NEW HI 60 MIN: CPT | Mod: 25

## 2023-09-28 PROCEDURE — 99204 OFFICE O/P NEW MOD 45 MIN: CPT | Mod: 25

## 2023-09-28 PROCEDURE — 93000 ELECTROCARDIOGRAM COMPLETE: CPT

## 2023-09-28 RX ORDER — HYDROCODONE BITARTRATE AND HOMATROPINE METHYLBROMIDE 1.5; 5 MG/5ML; MG/5ML
5-1.5 SOLUTION ORAL
Qty: 300 | Refills: 0 | Status: DISCONTINUED | COMMUNITY
Start: 2023-09-18 | End: 2023-09-28

## 2023-09-28 RX ORDER — ESCITALOPRAM OXALATE 5 MG/1
5 TABLET, FILM COATED ORAL
Refills: 0 | Status: DISCONTINUED | COMMUNITY
End: 2023-09-28

## 2023-09-28 RX ORDER — HYDROCODONE BITARTRATE AND HOMATROPINE METHYLBROMIDE 1.5; 5 MG/1; MG/1
5-1.5 TABLET ORAL
Refills: 0 | Status: DISCONTINUED | COMMUNITY
End: 2023-09-28

## 2023-09-28 RX ORDER — LEVOTHYROXINE SODIUM 0.05 MG/1
50 TABLET ORAL DAILY
Refills: 0 | Status: ACTIVE | COMMUNITY

## 2023-09-28 RX ORDER — PANTOPRAZOLE SODIUM 40 MG/1
40 TABLET, DELAYED RELEASE ORAL
Refills: 0 | Status: DISCONTINUED | COMMUNITY
End: 2023-09-28

## 2023-09-29 RX ORDER — CLONAZEPAM 0.5 MG/1
0.5 TABLET ORAL
Refills: 0 | Status: ACTIVE | COMMUNITY

## 2023-09-29 RX ORDER — FAMOTIDINE 40 MG/1
40 TABLET, FILM COATED ORAL
Refills: 0 | Status: DISCONTINUED | COMMUNITY

## 2023-09-29 RX ORDER — TRAMADOL HYDROCHLORIDE 50 MG/1
50 TABLET, COATED ORAL
Qty: 12 | Refills: 0 | Status: DISCONTINUED | COMMUNITY
Start: 2023-05-12 | End: 2023-09-29

## 2023-09-29 RX ORDER — COLD-HOT PACK
125 MCG EACH MISCELLANEOUS
Refills: 0 | Status: ACTIVE | COMMUNITY
Start: 2023-09-29

## 2023-09-29 RX ORDER — LORATADINE 10 MG
TABLET,DISINTEGRATING ORAL
Refills: 0 | Status: ACTIVE | COMMUNITY

## 2023-09-29 RX ORDER — OMEGA-3-ACID ETHYL ESTERS CAPSULES 1 G/1
1 CAPSULE, LIQUID FILLED ORAL
Refills: 0 | Status: ACTIVE | COMMUNITY
Start: 2022-09-26

## 2023-09-29 RX ORDER — UBIDECARENONE/VIT E ACET 100MG-5
50 MCG CAPSULE ORAL
Refills: 0 | Status: ACTIVE | COMMUNITY

## 2023-10-03 NOTE — ED PROVIDER NOTE - SKIN, MLM
Plan: Discussed Taking 2 pills BID x 5 days for outbreaks or taking 1 tablets QD for preventative maintenance.  Will have patient monitor on how many she is getting and then follow up with me in 3 months to discuss further treatment plan Render In Strict Bullet Format?: No Detail Level: Zone Skin normal color for race, warm, dry and intact. No evidence of rash.

## 2023-10-05 ENCOUNTER — APPOINTMENT (OUTPATIENT)
Dept: DERMATOLOGY | Facility: CLINIC | Age: 73
End: 2023-10-05
Payer: MEDICARE

## 2023-10-05 DIAGNOSIS — L82.0 INFLAMED SEBORRHEIC KERATOSIS: ICD-10-CM

## 2023-10-05 DIAGNOSIS — L73.9 FOLLICULAR DISORDER, UNSPECIFIED: ICD-10-CM

## 2023-10-05 PROCEDURE — 11900 INJECT SKIN LESIONS </W 7: CPT | Mod: 59

## 2023-10-05 PROCEDURE — 17110 DESTRUCTION B9 LES UP TO 14: CPT

## 2023-10-05 PROCEDURE — 99213 OFFICE O/P EST LOW 20 MIN: CPT | Mod: 25

## 2023-10-10 ENCOUNTER — OFFICE (OUTPATIENT)
Dept: URBAN - METROPOLITAN AREA CLINIC 102 | Facility: CLINIC | Age: 73
Setting detail: OPHTHALMOLOGY
End: 2023-10-10
Payer: MEDICARE

## 2023-10-10 VITALS — HEIGHT: 60 IN

## 2023-10-10 DIAGNOSIS — H16.221: ICD-10-CM

## 2023-10-10 DIAGNOSIS — H02.834: ICD-10-CM

## 2023-10-10 DIAGNOSIS — H40.033: ICD-10-CM

## 2023-10-10 DIAGNOSIS — H43.393: ICD-10-CM

## 2023-10-10 DIAGNOSIS — H25.13: ICD-10-CM

## 2023-10-10 DIAGNOSIS — H01.005: ICD-10-CM

## 2023-10-10 DIAGNOSIS — H16.223: ICD-10-CM

## 2023-10-10 DIAGNOSIS — H01.004: ICD-10-CM

## 2023-10-10 DIAGNOSIS — H16.222: ICD-10-CM

## 2023-10-10 DIAGNOSIS — H01.002: ICD-10-CM

## 2023-10-10 DIAGNOSIS — H01.001: ICD-10-CM

## 2023-10-10 DIAGNOSIS — H02.831: ICD-10-CM

## 2023-10-10 PROCEDURE — 92014 COMPRE OPH EXAM EST PT 1/>: CPT | Performed by: OPHTHALMOLOGY

## 2023-10-10 ASSESSMENT — REFRACTION_MANIFEST
OS_CYLINDER: -2.75
OS_SPHERE: -0.50
OS_CYLINDER: -2.75
OS_VA1: 20/30
OS_AXIS: 085
OD_CYLINDER: -2.00
OD_ADD: +2.50
OS_AXIS: 085
OS_ADD: +2.50
OS_VA1: 20/20
OD_SPHERE: PLANO
OD_ADD: +2.50
OS_ADD: +2.50
OS_SPHERE: -0.75
OD_VA1: 20/20
OD_SPHERE: -0.25
OD_VA1: 20/20
OD_AXIS: 100
OD_AXIS: 100
OD_CYLINDER: -2.00

## 2023-10-10 ASSESSMENT — REFRACTION_AUTOREFRACTION
OS_SPHERE: +0.50
OD_SPHERE: 0.00
OS_AXIS: 087
OD_CYLINDER: -2.00
OD_AXIS: 094
OS_CYLINDER: -4.25

## 2023-10-10 ASSESSMENT — REFRACTION_CURRENTRX
OD_OVR_VA: 20/
OS_CYLINDER: -2.75
OS_SPHERE: -1.50
OS_ADD: +2.25
OS_CYLINDER: -2.75
OD_CYLINDER: -2.00
OD_ADD: +2.50
OS_OVR_VA: 20/
OD_AXIS: 100
OD_SPHERE: -0.25
OS_OVR_VA: 20/
OD_OVR_VA: 20/
OD_AXIS: 97
OD_CYLINDER: -1.75
OS_AXIS: 085
OD_VPRISM_DIRECTION: PROGS
OS_AXIS: 79
OS_SPHERE: -0.25
OD_ADD: +2.25
OS_ADD: +2.50
OD_SPHERE: -1.00
OS_VPRISM_DIRECTION: PROGS
OS_VPRISM_DIRECTION: PROGS
OD_VPRISM_DIRECTION: PROGS

## 2023-10-10 ASSESSMENT — LID EXAM ASSESSMENTS
OD_BLEPHARITIS: RLL RUL T
OS_BLEPHARITIS: LLL LUL T

## 2023-10-10 ASSESSMENT — SPHEQUIV_DERIVED
OD_SPHEQUIV: -1.25
OS_SPHEQUIV: -1.875
OS_SPHEQUIV: -2.125
OS_SPHEQUIV: -1.625
OD_SPHEQUIV: -1

## 2023-10-10 ASSESSMENT — DECREASING TEAR LAKE - SEVERITY SCORE
OD_DEC_TEARLAKE: 2+
OS_DEC_TEARLAKE: 2+

## 2023-10-10 ASSESSMENT — AXIALLENGTH_DERIVED
OS_AL: 24.1941
OD_AL: 24.2336
OS_AL: 24.297
OS_AL: 24.4007
OD_AL: 24.1313

## 2023-10-10 ASSESSMENT — LID POSITION - DERMATOCHALASIS
OS_DERMATOCHALASIS: 1+ 2+
OD_DERMATOCHALASIS: 1+ 2+

## 2023-10-10 ASSESSMENT — KERATOMETRY
METHOD_AUTO_MANUAL: AUTO
OS_K2POWER_DIOPTERS: 45.00
OD_AXISANGLE_DEGREES: 179
OS_K1POWER_DIOPTERS: 42.25
OS_AXISANGLE_DEGREES: 111
OD_K1POWER_DIOPTERS: 42.50
OD_K2POWER_DIOPTERS: 43.75

## 2023-10-10 ASSESSMENT — TONOMETRY
OS_IOP_MMHG: 15
OD_IOP_MMHG: 13

## 2023-10-10 ASSESSMENT — VISUAL ACUITY
OS_BCVA: 20/25
OD_BCVA: 20/30-

## 2023-10-10 ASSESSMENT — CONFRONTATIONAL VISUAL FIELD TEST (CVF)
OS_FINDINGS: FULL
OD_FINDINGS: FULL

## 2023-10-16 ENCOUNTER — RX RENEWAL (OUTPATIENT)
Age: 73
End: 2023-10-16

## 2023-10-17 ENCOUNTER — NON-APPOINTMENT (OUTPATIENT)
Age: 73
End: 2023-10-17

## 2023-10-18 ENCOUNTER — OUTPATIENT (OUTPATIENT)
Dept: OUTPATIENT SERVICES | Facility: HOSPITAL | Age: 73
LOS: 1 days | End: 2023-10-18
Payer: MEDICARE

## 2023-10-18 ENCOUNTER — RESULT REVIEW (OUTPATIENT)
Age: 73
End: 2023-10-18

## 2023-10-18 VITALS
SYSTOLIC BLOOD PRESSURE: 117 MMHG | HEIGHT: 71 IN | HEART RATE: 71 BPM | WEIGHT: 173.06 LBS | RESPIRATION RATE: 16 BRPM | OXYGEN SATURATION: 98 % | DIASTOLIC BLOOD PRESSURE: 69 MMHG | TEMPERATURE: 97 F

## 2023-10-18 DIAGNOSIS — Z98.890 OTHER SPECIFIED POSTPROCEDURAL STATES: Chronic | ICD-10-CM

## 2023-10-18 DIAGNOSIS — Z90.79 ACQUIRED ABSENCE OF OTHER GENITAL ORGAN(S): Chronic | ICD-10-CM

## 2023-10-18 DIAGNOSIS — Z41.9 ENCOUNTER FOR PROCEDURE FOR PURPOSES OTHER THAN REMEDYING HEALTH STATE, UNSPECIFIED: Chronic | ICD-10-CM

## 2023-10-18 DIAGNOSIS — I47.10 SUPRAVENTRICULAR TACHYCARDIA, UNSPECIFIED: ICD-10-CM

## 2023-10-18 DIAGNOSIS — Z98.1 ARTHRODESIS STATUS: Chronic | ICD-10-CM

## 2023-10-18 LAB
ANION GAP SERPL CALC-SCNC: 5 MMOL/L — SIGNIFICANT CHANGE UP (ref 5–17)
ANION GAP SERPL CALC-SCNC: 5 MMOL/L — SIGNIFICANT CHANGE UP (ref 5–17)
BASOPHILS # BLD AUTO: 0.11 K/UL — SIGNIFICANT CHANGE UP (ref 0–0.2)
BASOPHILS # BLD AUTO: 0.11 K/UL — SIGNIFICANT CHANGE UP (ref 0–0.2)
BASOPHILS NFR BLD AUTO: 1.2 % — SIGNIFICANT CHANGE UP (ref 0–2)
BASOPHILS NFR BLD AUTO: 1.2 % — SIGNIFICANT CHANGE UP (ref 0–2)
BLD GP AB SCN SERPL QL: SIGNIFICANT CHANGE UP
BLD GP AB SCN SERPL QL: SIGNIFICANT CHANGE UP
BUN SERPL-MCNC: 12 MG/DL — SIGNIFICANT CHANGE UP (ref 7–23)
BUN SERPL-MCNC: 12 MG/DL — SIGNIFICANT CHANGE UP (ref 7–23)
CALCIUM SERPL-MCNC: 9.9 MG/DL — SIGNIFICANT CHANGE UP (ref 8.5–10.1)
CALCIUM SERPL-MCNC: 9.9 MG/DL — SIGNIFICANT CHANGE UP (ref 8.5–10.1)
CHLORIDE SERPL-SCNC: 105 MMOL/L — SIGNIFICANT CHANGE UP (ref 96–108)
CHLORIDE SERPL-SCNC: 105 MMOL/L — SIGNIFICANT CHANGE UP (ref 96–108)
CO2 SERPL-SCNC: 31 MMOL/L — SIGNIFICANT CHANGE UP (ref 22–31)
CO2 SERPL-SCNC: 31 MMOL/L — SIGNIFICANT CHANGE UP (ref 22–31)
CREAT SERPL-MCNC: 0.98 MG/DL — SIGNIFICANT CHANGE UP (ref 0.5–1.3)
CREAT SERPL-MCNC: 0.98 MG/DL — SIGNIFICANT CHANGE UP (ref 0.5–1.3)
EGFR: 82 ML/MIN/1.73M2 — SIGNIFICANT CHANGE UP
EGFR: 82 ML/MIN/1.73M2 — SIGNIFICANT CHANGE UP
EOSINOPHIL # BLD AUTO: 0.23 K/UL — SIGNIFICANT CHANGE UP (ref 0–0.5)
EOSINOPHIL # BLD AUTO: 0.23 K/UL — SIGNIFICANT CHANGE UP (ref 0–0.5)
EOSINOPHIL NFR BLD AUTO: 2.4 % — SIGNIFICANT CHANGE UP (ref 0–6)
EOSINOPHIL NFR BLD AUTO: 2.4 % — SIGNIFICANT CHANGE UP (ref 0–6)
FUNGUS SPEC CULT ORG #8: ABNORMAL
GLUCOSE SERPL-MCNC: 100 MG/DL — HIGH (ref 70–99)
GLUCOSE SERPL-MCNC: 100 MG/DL — HIGH (ref 70–99)
HCT VFR BLD CALC: 44.8 % — SIGNIFICANT CHANGE UP (ref 39–50)
HCT VFR BLD CALC: 44.8 % — SIGNIFICANT CHANGE UP (ref 39–50)
HGB BLD-MCNC: 15.1 G/DL — SIGNIFICANT CHANGE UP (ref 13–17)
HGB BLD-MCNC: 15.1 G/DL — SIGNIFICANT CHANGE UP (ref 13–17)
IMM GRANULOCYTES NFR BLD AUTO: 1.2 % — HIGH (ref 0–0.9)
IMM GRANULOCYTES NFR BLD AUTO: 1.2 % — HIGH (ref 0–0.9)
LYMPHOCYTES # BLD AUTO: 1.6 K/UL — SIGNIFICANT CHANGE UP (ref 1–3.3)
LYMPHOCYTES # BLD AUTO: 1.6 K/UL — SIGNIFICANT CHANGE UP (ref 1–3.3)
LYMPHOCYTES # BLD AUTO: 17 % — SIGNIFICANT CHANGE UP (ref 13–44)
LYMPHOCYTES # BLD AUTO: 17 % — SIGNIFICANT CHANGE UP (ref 13–44)
MCHC RBC-ENTMCNC: 32.1 PG — SIGNIFICANT CHANGE UP (ref 27–34)
MCHC RBC-ENTMCNC: 32.1 PG — SIGNIFICANT CHANGE UP (ref 27–34)
MCHC RBC-ENTMCNC: 33.7 GM/DL — SIGNIFICANT CHANGE UP (ref 32–36)
MCHC RBC-ENTMCNC: 33.7 GM/DL — SIGNIFICANT CHANGE UP (ref 32–36)
MCV RBC AUTO: 95.1 FL — SIGNIFICANT CHANGE UP (ref 80–100)
MCV RBC AUTO: 95.1 FL — SIGNIFICANT CHANGE UP (ref 80–100)
MONOCYTES # BLD AUTO: 0.82 K/UL — SIGNIFICANT CHANGE UP (ref 0–0.9)
MONOCYTES # BLD AUTO: 0.82 K/UL — SIGNIFICANT CHANGE UP (ref 0–0.9)
MONOCYTES NFR BLD AUTO: 8.7 % — SIGNIFICANT CHANGE UP (ref 2–14)
MONOCYTES NFR BLD AUTO: 8.7 % — SIGNIFICANT CHANGE UP (ref 2–14)
NEUTROPHILS # BLD AUTO: 6.54 K/UL — SIGNIFICANT CHANGE UP (ref 1.8–7.4)
NEUTROPHILS # BLD AUTO: 6.54 K/UL — SIGNIFICANT CHANGE UP (ref 1.8–7.4)
NEUTROPHILS NFR BLD AUTO: 69.5 % — SIGNIFICANT CHANGE UP (ref 43–77)
NEUTROPHILS NFR BLD AUTO: 69.5 % — SIGNIFICANT CHANGE UP (ref 43–77)
PLATELET # BLD AUTO: 215 K/UL — SIGNIFICANT CHANGE UP (ref 150–400)
PLATELET # BLD AUTO: 215 K/UL — SIGNIFICANT CHANGE UP (ref 150–400)
POTASSIUM SERPL-MCNC: 4.3 MMOL/L — SIGNIFICANT CHANGE UP (ref 3.5–5.3)
POTASSIUM SERPL-MCNC: 4.3 MMOL/L — SIGNIFICANT CHANGE UP (ref 3.5–5.3)
POTASSIUM SERPL-SCNC: 4.3 MMOL/L — SIGNIFICANT CHANGE UP (ref 3.5–5.3)
POTASSIUM SERPL-SCNC: 4.3 MMOL/L — SIGNIFICANT CHANGE UP (ref 3.5–5.3)
RBC # BLD: 4.71 M/UL — SIGNIFICANT CHANGE UP (ref 4.2–5.8)
RBC # BLD: 4.71 M/UL — SIGNIFICANT CHANGE UP (ref 4.2–5.8)
RBC # FLD: 14 % — SIGNIFICANT CHANGE UP (ref 10.3–14.5)
RBC # FLD: 14 % — SIGNIFICANT CHANGE UP (ref 10.3–14.5)
SODIUM SERPL-SCNC: 141 MMOL/L — SIGNIFICANT CHANGE UP (ref 135–145)
SODIUM SERPL-SCNC: 141 MMOL/L — SIGNIFICANT CHANGE UP (ref 135–145)
WBC # BLD: 9.41 K/UL — SIGNIFICANT CHANGE UP (ref 3.8–10.5)
WBC # BLD: 9.41 K/UL — SIGNIFICANT CHANGE UP (ref 3.8–10.5)
WBC # FLD AUTO: 9.41 K/UL — SIGNIFICANT CHANGE UP (ref 3.8–10.5)
WBC # FLD AUTO: 9.41 K/UL — SIGNIFICANT CHANGE UP (ref 3.8–10.5)

## 2023-10-18 PROCEDURE — 71046 X-RAY EXAM CHEST 2 VIEWS: CPT

## 2023-10-18 PROCEDURE — 86900 BLOOD TYPING SEROLOGIC ABO: CPT

## 2023-10-18 PROCEDURE — 99214 OFFICE O/P EST MOD 30 MIN: CPT | Mod: 25

## 2023-10-18 PROCEDURE — 85025 COMPLETE CBC W/AUTO DIFF WBC: CPT

## 2023-10-18 PROCEDURE — 86850 RBC ANTIBODY SCREEN: CPT

## 2023-10-18 PROCEDURE — 71046 X-RAY EXAM CHEST 2 VIEWS: CPT | Mod: 26

## 2023-10-18 PROCEDURE — 80048 BASIC METABOLIC PNL TOTAL CA: CPT

## 2023-10-18 PROCEDURE — 93005 ELECTROCARDIOGRAM TRACING: CPT

## 2023-10-18 PROCEDURE — 36415 COLL VENOUS BLD VENIPUNCTURE: CPT

## 2023-10-18 PROCEDURE — 93010 ELECTROCARDIOGRAM REPORT: CPT

## 2023-10-18 PROCEDURE — 86901 BLOOD TYPING SEROLOGIC RH(D): CPT

## 2023-10-18 RX ORDER — LEVOTHYROXINE SODIUM 125 MCG
1 TABLET ORAL
Qty: 0 | Refills: 0 | DISCHARGE

## 2023-10-18 NOTE — H&P PST ADULT - GENERAL
Post-Care Instructions: I reviewed with the patient in detail post-care instructions. Patient is to wear sunprotection, and avoid picking at any of the treated lesions. Pt may apply Vaseline to crusted or scabbing areas. negative

## 2023-10-18 NOTE — H&P PST ADULT - NSICDXPASTMEDICALHX_GEN_ALL_CORE_FT
PAST MEDICAL HISTORY:  Anxiety disorder     Back pain     BPH (benign prostatic hyperplasia)     Bundle branch block     Cataract     Constipation     Depression     Frozen shoulder left    GERD (gastroesophageal reflux disease)     Hiatal hernia     Hypothyroid     Lumbar disc herniation     Lumbar radiculopathy     Mitral stenosis     Renal cyst left  and right PAST MEDICAL HISTORY:  Anxiety disorder     Back pain     Bladder neck stenosis, congenital     BPH (benign prostatic hyperplasia)     Bundle branch block RBBB    Cataract Early    Constipation     Cough variant asthma     Depression     Diverticulosis     Frozen shoulder left    Gastroesophageal reflux disease with hiatal hernia     GERD (gastroesophageal reflux disease)     H/O bursitis Both shoulders    H/O: GI bleed     Hiatal hernia     Hip bursitis, left     History of IBS     History of narrow angle glaucoma     HLD (hyperlipidemia)     Hypothyroid     IBS (irritable bowel syndrome) IBS-C    Lumbar disc herniation     Lumbar radiculopathy     Mitral stenosis     Neuropathy     PVC (premature ventricular contraction)     PVCs (premature ventricular contractions)     Renal cyst left  and right    Sustained SVT      PAST MEDICAL HISTORY:  Anxiety disorder     Back pain     Bladder neck stenosis, congenital     BPH (benign prostatic hyperplasia)     Bundle branch block RBBB    Cataract Early    Constipation     Cough variant asthma     Depression     Diverticulosis     Frozen shoulder left    Gastroesophageal reflux disease with hiatal hernia     GERD (gastroesophageal reflux disease)     H/O bursitis Both shoulders    H/O: GI bleed     Hiatal hernia     Hip bursitis, left     History of IBS     History of narrow angle glaucoma     HLD (hyperlipidemia)     Hypothyroid     IBS (irritable bowel syndrome) IBS-C    Lumbar disc herniation     Lumbar radiculopathy     Mitral stenosis     Neuropathy     PVC (premature ventricular contraction)     Renal calculus, left     Renal cyst left  and right    Sustained SVT

## 2023-10-18 NOTE — H&P PST ADULT - BLOOD AVOIDANCE/RESTRICTIONS, PROFILE
Chief complaint:  Chief Complaint   Patient presents with   • Cardiac Valve Problem     follow up for BP meds   • Hypertension         Vitals:  Visit Vitals   • /80   • Pulse 68   • Resp 16   • Ht 5' 6\" (1.676 m)   • Wt 80.3 kg   • BMI 28.57 kg/m2         HISTORY OF PRESENT ILLNESS     HPI: Mars is a 57 year old male  patient who comes in follow-up after AVR in  with a bioprosthetic valve.  He also has hypertension and hyperlipidemia.  He feels well.  He denies any exertional chest tightness, pain, or pressure.  He denies any exertional shortness of breath.  He denies any heart racing, palpitations, or fluttering. He denies any dizziness, vertigo, or syncope.  He has no edema.  He continues to smoke.    Other significant problems:  Patient Active Problem List   Diagnosis   • Stroke risk   • Ataxia   • Essential hypertension, benign   • Pure hypercholesterolemia   • Acute kidney injury   • Nonrheumatic aortic valve stenosis   • S/P AVR (aortic valve replacement)   • Gastroesophageal reflux disease without esophagitis           PAST MEDICAL, FAMILY AND SOCIAL HISTORY     Medications:  Current Medications    ALBUTEROL 108 (90 BASE) MCG/ACT INHALER    Inhale 2 puffs into the lungs every 4 hours as needed for Shortness of Breath or Wheezing.    ALLOPURINOL (ZYLOPRIM) 100 MG TABLET    Take 1 tablet by mouth 2 times daily. generic    ASPIRIN (ASPIRIN CHILDRENS) 81 MG CHEWABLE TABLET    Chew 1 tablet by mouth daily.    ATORVASTATIN (LIPITOR) 10 MG TABLET    Take 1 tablet by mouth daily. Generic    METOPROLOL (LOPRESSOR) 25 MG TABLET    Take 1 tablet by mouth every 12 hours.    OMEPRAZOLE (PRILOSEC) 20 MG CAPSULE    Take 20 mg by mouth every other day.         Allergies:  ALLERGIES:  No Known Allergies    Past Medical  History/Surgeries:  Past Medical History   Diagnosis Date   • Arthritis    • Bronchitis    • Dental abscess 11/2016   • Essential (primary) hypertension    • Fatigue    • Fracture      Jaw   •  Gastroesophageal reflux disease    • High cholesterol    • Nonrheumatic aortic valve stenosis 5/31/2016     Bicuspid   • Pulmonary HTN      Mild   • SOB (shortness of breath)    • Wears reading eyeglasses        Past Surgical History   Procedure Laterality Date   • Fracture surgery  1980     jaw wired    • Cardiac catheterization  07/26/2016     severe, calcific aortic valve stenosis   • Multiple tooth extractions  11/23/2016     X 2   • Aortic valve replacement  12/09/2016       Family History:  Family History   Problem Relation Age of Onset   • Cancer Father    • Dementia Mother    • Cancer Sister      Breast   • Cancer Sister      Colon       Social History:  Social History     Social History   • Marital status:      Spouse name: N/A   • Number of children: N/A   • Years of education: N/A     Occupational History   • Not on file.     Social History Main Topics   • Smoking status: Current Every Day Smoker     Packs/day: 0.50     Years: 40.00     Types: Cigarettes   • Smokeless tobacco: Never Used      Comment: (2 packs X 20) then cut back   • Alcohol use 7.2 oz/week     12 Cans of beer per week   • Drug use: No   • Sexual activity: Not on file     Other Topics Concern   • Not on file     Social History Narrative         REVIEW OF SYSTEMS       CONSTITUTIONAL:  Denies fever, chills, myalgias, or declining functional capacity.  RESPIRATORY:  Denies cough, dyspnea, wheezing, or sputum production.  CARDIOVASCULAR:  Denies chest pain, dyspnea, or palpitations.  EXTREMITIES:  Denies lower extremity edema.  INTEGUMENT:  Denies rash.   NEUROLOGICAL:  Denies lightheadedness, dizziness, or near syncopal events      PHYSICAL EXAM     VITAL SIGNS:    Visit Vitals   • /80   • Pulse 68   • Resp 16   • Ht 5' 6\" (1.676 m)   • Wt 80.3 kg   • BMI 28.57 kg/m2     GENERAL:  Mars Ma is a 57 year old male who is well developed and well nourished.  He is in no acute distress, non-toxic appearance.   HEENT:  Atraumatic,  external ears normal, nose normal, oropharynx moist. Eyes: PERRL (Pupils equal, round, reactive to light), conjunctivae normal. Left carotid: No bruit. Right carotid: No bruit.  RESPIRATORY:  No respiratory distress, normal breath sounds, no rales, no wheezing.   CARDIOVASCULAR:  Normal rate, normal rhythm, no murmurs, no gallops, no rubs.   GASTROINTESTINAL:  Normal bowel sounds, nondistended, soft, nontender, no mass, no rebound, no guarding.   GENITOURINARY:  No costovertebral angle tenderness.   MUSCULOSKELETAL:  Back - no tenderness.  SKIN:  Well hydrated, no rash.   LYMPHATICS:  No lymphadenopathy noted.   EXTREMITIES:  No clubbing cyanosis or edema. Pulses are adequate in all extremities.  NEUROLOGICAL:  Alert & oriented x 3, CN (cranial nerves) 2-12 normal, normal motor function, normal sensory function, no focal deficits noted.   PSYCHIATRIC:  Speech and behavior appropriate.     Pertinent laboratory tests:    Lab Results   Component Value Date    SODIUM 136 12/12/2016    POTASSIUM 3.0 (L) 12/13/2016    CO2 30 12/12/2016    BUN 15 12/12/2016    CREATININE 0.86 12/12/2016    GFRNA >90 12/12/2016       Lab Results   Component Value Date    AST 19 12/02/2016    GPT 31 12/02/2016    ALKPT 107 12/02/2016    BILIRUBIN 0.6 12/02/2016       Lab Results   Component Value Date    GLUCOSE 96 12/12/2016    HGBA1C 5.1 12/02/2016       Lab Results   Component Value Date    WBC 9.0 12/12/2016    HCT 30.1 (L) 12/12/2016    HGB 10.2 (L) 12/12/2016    PLT 89 (L) 12/12/2016    INR 3.5 01/05/2017       Lab Results   Component Value Date    TSH 2.550 12/02/2016       Lab Results   Component Value Date    CHOLESTEROL 121 07/26/2016    HDL 45 07/26/2016    CALCLDL 61 07/26/2016    TRIGLYCERIDE 75 07/26/2016       Lab Results   Component Value Date    BUN 15 12/12/2016    CREATININE 0.86 12/12/2016    GFRNA >90 12/12/2016         Diagnostic tests reviewed     EKG: Reviewed from 2-2-2017          Shannan SAM, attest that I  performed the duties of a scribe for this  encounter with the exception of the assessment and plan in the presence of Dr. Riaz White and the patient.  ASSESSMENT/PLAN     Mars is a 57-year-old male who returns to the office for follow-up after undergoing aortic valve replacement. Since surgery in December, the patient states he's felt great. His dyspnea and endurance have improved, and he otherwise has no physical limitations. Today's blood pressures well controlled, his EKG reveals sinus rhythm with no acute changes and his LDL level LXI. Overall he's doing quite well.    Recommendations:  #1 severe aortic valve stenosis in the setting of a bicuspid aortic valve, status post aortic valve replacement using a #23 mm Sanchez into the lead bioprosthetic valve December 2016. Continue to follow clinically at this point, we did encourage cardiac rehabilitation as well. Plan to repeat a transthoracic echocardiogram in 6 months for reevaluation of aortic valve hemodynamics.  #2 hyperlipidemia: Well-controlled today, continue with atorvastatin 10 mg nightly  #3 hypertension: Currently at goal no changes to his medical regimen listed above  #4 chronic tobacco use: We had a lengthy conversation discussing the deleterious effects of tobacco use on his overall risk and health, the patient understands that if he stop smoking he may reduce adverse cardiovascular events by nearly 50%  #5 encouraged the patient follow-up with his primary care provider as planned and return to the cardiology service in 3-4 months with Dr. White  #6 subacute bacterial endocarditis prophylaxis prior to dental work and surgical procedures    Thank you very much for allowing us to take part in this patient's care    Riaz White DO, FACC, LESLEY, JAYSHREE      I, Riaz White, DVAHID. attest that I performed all of the work during this encounter and that the scribe only recorded my findings.         patient concern about blood borne infection none

## 2023-10-18 NOTE — H&P PST ADULT - NSICDXPASTSURGICALHX_GEN_ALL_CORE_FT
PAST SURGICAL HISTORY:  Elective surgery lumbar PEREZ x 2    H/O endoscopy 08/2019    H/O right inguinal hernia repair 2008    S/P colonoscopy     S/P lumbar spinal fusion 0/2018 PAST SURGICAL HISTORY:  Elective surgery lumbar PEREZ x 2    H/O endoscopy 08/2019    H/O right inguinal hernia repair 2008    History of cystoscopy TURP---8/28/2019    S/P colonoscopy 2021    S/P lumbar spinal fusion 2018    S/P TURP (transurethral resection of prostate)

## 2023-10-18 NOTE — H&P PST ADULT - ASSESSMENT
1. EPS department to give written instructions to patient regarding medication management .  2. EPS booking will call patient the day before procedure regarding patient arrival time the day of surgery.  3. Instructed patient to have responsible adult to drive patient home if being discharged same day.  4. EZ wash  instructions explained to patient.   5. NPO post midnight  73 year old male with SVT; Pt said he had sustained SVT x1 hour; HR was in the 160's-180's as per pt;  September 2023; He was here in ; Currently denies CP , SOB, palpitations; he presents to PST for planned  EPS ablation   1. EPS department to give written instructions to patient regarding medication management .  2. EPS booking will call patient the day before procedure regarding patient arrival time the day of surgery.  3. Instructed patient to have responsible adult to drive patient home if being discharged same day.  4. EZ wash  instructions explained to patient.   5. NPO post midnight

## 2023-10-18 NOTE — H&P PST ADULT - NSICDXFAMILYHX_GEN_ALL_CORE_FT
FAMILY HISTORY:  Mother  Still living? No  Family history of diabetes mellitus, Age at diagnosis: Age Unknown FAMILY HISTORY:  Mother  Still living? No  Family history of diabetes mellitus, Age at diagnosis: Age Unknown

## 2023-10-18 NOTE — H&P PST ADULT - HISTORY OF PRESENT ILLNESS
68 y/o male with BPH. Complain of urine urgency, slow urine flow. Denies hematuria. Scheduled for Transurethral resection of prostate. 73 year old male with SVT; Pt said he had sustained SVT x1 hour; HR was in the 160's-180's as per pt;  September 2023; He was here in ; Currently denies CP , SOB, palpitations; he presents to PST for planned  EPS ablation

## 2023-10-19 DIAGNOSIS — I47.10 SUPRAVENTRICULAR TACHYCARDIA, UNSPECIFIED: ICD-10-CM

## 2023-10-23 ENCOUNTER — NON-APPOINTMENT (OUTPATIENT)
Age: 73
End: 2023-10-23

## 2023-10-24 NOTE — PATIENT PROFILE ADULT. - PAIN LOCATION, PROFILE
Operative Report    Date: 10/24/2023    Surgeon: Shelly Shin M.D.    Assistant: COLIN Elizabeth    My assistant, COLIN Elizabeth, was medically necessary for this procedure. He ran the laparoscopic to aid in my visualization of the necessary sturctures, assisted with retraction, hemostasis and wound culture.    Anesthesiologist: Uzma WELLER    Pre-operative Diagnosis:  K35 Acute appendicitis    Post-operative Diagnosis: same     Procedure: 09819 Laparoscopy, surgical, appendectomy    An extensive procedures, alternative, risks and questions conference was held with the patient in regard to the surgical treatment of appendicitis. The patient was counseled regarding the benefits of the operation, namely, cure of infection. The patient was made aware of the alternatives, including operative and non-operative management. The risks of bleeding, infection, damage to surrounding structures, need for reoperation, need for open opeation, stroke, MI, and death were discussed with the patient. The patient was given a chance to ask questions, and all her questions were answered. Discussion was undertaken with Layman's terms. The patient and family demonstrated adequate understanding,c seemed pleased with the plan, and wish to proceed. Consent was given in clear state of mind.  Consent to be signed.     Findings: acute appendicitis    Procedure in detail: The patient was identified in the pre-operative holding area and brought to the operating room. Correct side and site were identified. Pre-operative antibiotics were administered prior to the procedure. GETA was smoothly induced. The patient was prepped and draped in the usual sterile fashion.    I infiltrated local anesthetic above the umbilicus, and made an incision to accommodate a 5 mm port. I placed the Veress needle intraperitoneally and insufflated the abdomen to 15 mm Hg, which the patient tolerated well with initially low insufflation pressures. A 5 mm scope was  placed into the abdomen and the abdomen investigated and no evidence of intraperitoneal injury secondary to Veress or trocar placement was found. Then under videoscopic guidance, a 5 mm port was placed above the pubic bone, and a 12mm port was placed in the left lower quadrant. The right lower quadrant was investigated and the appendix identified, it appeared inflamed. The appendix was elevated, the mesentery divided with the ligasure, and the base of the appendix divided with a CATHERINE stapler. The appendix was placed into an endocatch bag and brought out the left lower quadrant port. Good hemostasis was noted. All ports were removed and the abdomen exsufflated. All incisions were closed with monocryl and dressings were placed.     The patient was awakened from general anesthetic, and was taken to the recovery room in stable condition.    Sponge and needle counts were correct at the end of the case.     Specimen: appendix    EBL: minimal    Dispo: stable, extubated, to PACU    Shelly Shin M.D.  [facility]  [phone]           back, both legs

## 2023-10-24 NOTE — ASU PATIENT PROFILE, ADULT - FALL HARM RISK - UNIVERSAL INTERVENTIONS
Bed in lowest position, wheels locked, appropriate side rails in place/Call bell, personal items and telephone in reach/Instruct patient to call for assistance before getting out of bed or chair/Non-slip footwear when patient is out of bed/[place] to call system/Physically safe environment - no spills, clutter or unnecessary equipment/Purposeful Proactive Rounding/Room/bathroom lighting operational, light cord in reach

## 2023-10-25 ENCOUNTER — APPOINTMENT (OUTPATIENT)
Dept: GASTROENTEROLOGY | Facility: AMBULATORY MEDICAL SERVICES | Age: 73
End: 2023-10-25

## 2023-10-25 ENCOUNTER — OUTPATIENT (OUTPATIENT)
Dept: OUTPATIENT SERVICES | Facility: HOSPITAL | Age: 73
LOS: 1 days | Discharge: HOME | End: 2023-10-25
Payer: MEDICARE

## 2023-10-25 VITALS
DIASTOLIC BLOOD PRESSURE: 82 MMHG | WEIGHT: 173.06 LBS | TEMPERATURE: 97 F | HEART RATE: 72 BPM | HEIGHT: 71 IN | RESPIRATION RATE: 15 BRPM | OXYGEN SATURATION: 96 % | SYSTOLIC BLOOD PRESSURE: 134 MMHG

## 2023-10-25 DIAGNOSIS — Z98.890 OTHER SPECIFIED POSTPROCEDURAL STATES: Chronic | ICD-10-CM

## 2023-10-25 DIAGNOSIS — Z98.1 ARTHRODESIS STATUS: Chronic | ICD-10-CM

## 2023-10-25 DIAGNOSIS — I47.10 SUPRAVENTRICULAR TACHYCARDIA, UNSPECIFIED: ICD-10-CM

## 2023-10-25 DIAGNOSIS — Z41.9 ENCOUNTER FOR PROCEDURE FOR PURPOSES OTHER THAN REMEDYING HEALTH STATE, UNSPECIFIED: Chronic | ICD-10-CM

## 2023-10-25 DIAGNOSIS — Z90.79 ACQUIRED ABSENCE OF OTHER GENITAL ORGAN(S): Chronic | ICD-10-CM

## 2023-10-25 PROCEDURE — 93653 COMPRE EP EVAL TX SVT: CPT

## 2023-10-25 PROCEDURE — 33221 INSERT PULSE GEN MULT LEADS: CPT

## 2023-10-25 PROCEDURE — 93010 ELECTROCARDIOGRAM REPORT: CPT

## 2023-10-25 PROCEDURE — C1894: CPT

## 2023-10-25 PROCEDURE — C1766: CPT

## 2023-10-25 PROCEDURE — 36415 COLL VENOUS BLD VENIPUNCTURE: CPT

## 2023-10-25 PROCEDURE — 80048 BASIC METABOLIC PNL TOTAL CA: CPT

## 2023-10-25 PROCEDURE — 85027 COMPLETE CBC AUTOMATED: CPT

## 2023-10-25 PROCEDURE — 93623 PRGRMD STIMJ&PACG IV RX NFS: CPT | Mod: 26

## 2023-10-25 PROCEDURE — C1732: CPT

## 2023-10-25 PROCEDURE — 93005 ELECTROCARDIOGRAM TRACING: CPT

## 2023-10-25 PROCEDURE — C1730: CPT

## 2023-10-25 RX ORDER — LANOLIN ALCOHOL/MO/W.PET/CERES
3 CREAM (GRAM) TOPICAL AT BEDTIME
Refills: 0 | Status: DISCONTINUED | OUTPATIENT
Start: 2023-10-25 | End: 2023-10-26

## 2023-10-25 RX ORDER — TAMSULOSIN HYDROCHLORIDE 0.4 MG/1
0.4 CAPSULE ORAL AT BEDTIME
Refills: 0 | Status: DISCONTINUED | OUTPATIENT
Start: 2023-10-25 | End: 2023-10-26

## 2023-10-25 RX ORDER — PANTOPRAZOLE SODIUM 20 MG/1
40 TABLET, DELAYED RELEASE ORAL
Refills: 0 | Status: DISCONTINUED | OUTPATIENT
Start: 2023-10-25 | End: 2023-10-26

## 2023-10-25 RX ORDER — LEVOTHYROXINE SODIUM 125 MCG
50 TABLET ORAL DAILY
Refills: 0 | Status: DISCONTINUED | OUTPATIENT
Start: 2023-10-25 | End: 2023-10-26

## 2023-10-25 RX ORDER — POLYETHYLENE GLYCOL 3350 17 G/17G
17 POWDER, FOR SOLUTION ORAL DAILY
Refills: 0 | Status: DISCONTINUED | OUTPATIENT
Start: 2023-10-25 | End: 2023-10-26

## 2023-10-25 RX ORDER — ONDANSETRON 8 MG/1
4 TABLET, FILM COATED ORAL EVERY 8 HOURS
Refills: 0 | Status: DISCONTINUED | OUTPATIENT
Start: 2023-10-25 | End: 2023-10-26

## 2023-10-25 RX ORDER — ISOPROTERENOL HYDROCHLORIDE 1 MG/5ML
1 INJECTION, SOLUTION INTRACARDIAC; INTRAMUSCULAR; INTRAVENOUS; SUBCUTANEOUS
Qty: 1 | Refills: 0 | Status: DISCONTINUED | OUTPATIENT
Start: 2023-10-25 | End: 2023-10-26

## 2023-10-25 RX ORDER — ASPIRIN/CALCIUM CARB/MAGNESIUM 324 MG
81 TABLET ORAL DAILY
Refills: 0 | Status: DISCONTINUED | OUTPATIENT
Start: 2023-10-25 | End: 2023-10-26

## 2023-10-25 RX ORDER — FAMOTIDINE 10 MG/ML
40 INJECTION INTRAVENOUS AT BEDTIME
Refills: 0 | Status: DISCONTINUED | OUTPATIENT
Start: 2023-10-25 | End: 2023-10-26

## 2023-10-25 RX ORDER — CLONAZEPAM 1 MG
0.5 TABLET ORAL AT BEDTIME
Refills: 0 | Status: DISCONTINUED | OUTPATIENT
Start: 2023-10-25 | End: 2023-10-26

## 2023-10-25 RX ORDER — BUPROPION HYDROCHLORIDE 150 MG/1
300 TABLET, EXTENDED RELEASE ORAL DAILY
Refills: 0 | Status: DISCONTINUED | OUTPATIENT
Start: 2023-10-25 | End: 2023-10-26

## 2023-10-25 RX ADMIN — ISOPROTERENOL HYDROCHLORIDE 15 MICROGRAM(S)/MIN: 1 INJECTION, SOLUTION INTRACARDIAC; INTRAMUSCULAR; INTRAVENOUS; SUBCUTANEOUS at 12:57

## 2023-10-25 RX ADMIN — Medication 0.5 MILLIGRAM(S): at 22:21

## 2023-10-25 RX ADMIN — TAMSULOSIN HYDROCHLORIDE 0.4 MILLIGRAM(S): 0.4 CAPSULE ORAL at 22:19

## 2023-10-25 NOTE — ASU PREOP CHECKLIST - HAIR REMOVAL
[facility] Progress Note for 2022 11:57 AM    Patient Name:RACHID DUMONT   Account #:[id number]  MRN:[id number]  Gender:Female  YOB: 2020 8:55 AM    Demographics    Date:2022 11:57:34 AM  Age:37 days  Post Conceptional Age:35 weeks 1 day  Weight:1.825kg    Date/Time of Admission:2021 8:55:00 AM  Birth Date/Time:2020 8:55:00 AM  Gestational Age at Birth:29 weeks 6 days    Primary Care Physician:Kamari Cerda MD    Current Medications:Duration:  1. caffeine citrate 12.3 mg Oral q 24h (60 mg/3 mL (20 mg/mL) solution(Oral))    (Until Discontinued)  (10 mg/kg/dose) Day 36  2. Poly-Vi-Sol with Iron 1 mL Oral q 24h (750 unit-400 unit-10 mg/mL   drops(Oral))  (Until Discontinued)  Day 29  3. Sucrose 24% solution 1 mL Oral  q 1h PRN painful procedure per protocol (1   unit/2 mL solution)  (Until Discontinued)  Day 5    PHYSICAL EXAMINATION    Respiratory StatusRoom Air    Growth Parameter(s)Weight: 1.825 kg   Length: 40.5 cm   HC: 29.0 cm    General:Bed/Temperature Support (stable in open crib); Respiratory Support (room   air);  Head:normocephalic; fontanelle (normal, flat); sutures (mobile); facial weakness    (left, minimal) (mild asymmetrical cry to left side of mouth);  Ears:ears (normal);  Nose:nares (normal); NG tube (yes);  Throat:mouth (normal);  Neck:general appearance (normal); range of motion (normal);  Respiratory:respiratory effort (retractions); breath sounds (normal, bilateral,   clear); intermittent tachypnea;  Cardiac:precordium (normal); rhythm (sinus rhythm); murmur (yes, II/VI, back,   sternal border); perfusion (normal); pulses (normal);  Abdomen:abdomen (soft, nontender, round, bowel sounds present, organomegaly   absent);  Genitourinary:genitalia (, female);  Anus and Rectum:anus (patent);  Spine:sacral dimple (no); spine appearance (normal);  Extremity:deformity (no); range of motion (normal);  Skin:skin appearance (); congenital dermal  melanocytosis (buttock) left   shoulder; edema (minimal) left side of face;  Neuro:mental status (alert); muscle tone (normal);    LABS  2022 8:07:00 AM   HCT 24.3; Retic 4.7    NUTRITION    Actual Enteral:  Breast Milk + Similac HMF HP CL (24 dwayne): 32ml po q 3hr  Cue Based Feeding  If Breast Milk + Similac HMF HP CL (24 dwayne) not available, use Similac Special   Care 24 High Protein    Total Actual Enteral:256 [id number] ml/kg/usj756 dwayne/kg/day    Projected Enteral:  Breast Milk + Similac HMF HP CL (24 dwayne): 32ml po q 3hr  Cue Based Feeding  If Breast Milk + Similac HMF HP CL (24 dwayne) not available, use Similac Special   Care 24 High Protein    Total Projected Enteral:256 vkl174 ml/kg/myc665 dwayne/kg/day    Output:  Stool (#):4Stool (g):  Void (#):8    DIAGNOSES  1.  , gestational age 29 completed weeks (P07.32)  Onset: 2021  Comments:  Gestational age based on [place] examination and EDC.    Plans:  [facility] Care per protocol   obtain car seat screen prior to discharge     2. Other low birth weight , 8094-6865 grams (P07.14)  Onset: 2021    3. Other apnea of  (P28.4)  Onset: 2021  Medications:  1.caffeine citrate 12.3 mg Oral q 24h (60 mg/3 mL (20 mg/mL) solution(Oral))    (Until Discontinued)  (10 mg/kg/dose) Weight: 1.23 kg started on 2021  Comments:  Infant at risk for apnea of prematurity. Caffeine started . Last episode   requiring stimulation .  Plans:   caffeine    follow clinically   discontinue caffeine when episode free for 7 days (< 30 weeks gestation)     4. 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  Left facial weakness noted.  Possibly   related to birth process, although not initially noticed.  Equal and symmetric   eye muscle movement.   Plans:   follow with neurology (Dr. Cohen on  at 1430)    5. Anemia of prematurity (P61.2)  Onset: 2022  Comments:  Hct 24.3 with  a retic of 4.7% on .  Infant stable in room air.     repeat hct and retic in 1 week-    6. Slow feeding of  (P92.2)  Onset: 2021  Comments:  Infant requiring gavage feedings due to prematurity. Infant completed   sequencing. Infant completed 2 nipple attempt over the past 24 hours.  Plans:   Cue Based Feeding   follow with OT/PT     7. Other specified disturbances of temperature regulation of  (P81.8)  Onset: 2021  Comments:  Admitted to an isolette.  Air temps improved by .  0800 open crib.  Plans:   follow temperature in an open crib     8. Nutritional Support ()  Onset: 2021  Medications:  1.Poly-Vi-Sol with Iron 1 mL Oral q 24h (750 unit-400 unit-10 mg/mL drops(Oral))    (Until Discontinued)  Weight: 1.59 kg started on 2021  Comments:  Feeding choice: breastfeeding. NPO at time of admission. Starter TPN begun upon   admit. Small feeds started , tolerated advancement. IVF discontinued .   Above birth weight on day of life 8. Advanced to bolus feeds.  13.1g/kg/day   growth velocity for week ending .  Plans:  24 dwayne/oz feeds    administer feeds on pump over 30 minutes  Poly-Vi-Sol with Iron (1.0 ml/day) as weight > 1500 grams   advance feeds as tolerated    9. Encounter for examination of ears and hearing without abnormal findings   (Z01.10)  Onset: 2021  Comments:  [place] hearing screening indicated.   passed ABR bilaterally.  Plans:   obtain hearing screen at 4-6 months age     10. Encounter for screening for other nervous system disorders (Z13.858)  Onset: 2021  Comments:  At risk for intraventricular hemorrhage secondary to prematurity. 10 day HUS   normal.  obtain HUS at 7 weeks of life    11. Encounter for screening for other developmental delays (Z13.49)  Onset: 2021  Comments:  Infant at risk for long term neurologic sequelae secondary to low birth weight   and prematurity.  Plans:   follow in Neurodevelopmental  Clinic at 4 months corrected age if referral   criteria met     12. Encounter for screening for other metabolic disorders -  Metabolic   Screening (Z13.228)  Onset: 2021  Comments:  [place] metabolic screening indicated. NBS screen sent  at 36 hrs, Normal   except for CH inconclusive. TSH 4.24 and Free T4 1.00, normal.  Plans:    Screen to be repeated at 28 days of life or prior to discharge if   birthweight < 2 kg OR NICU stay > 14 days pending     13. Encounter for immunization (Z23)  Onset: 2021  Comments:  Recommended immunizations prior to discharge as indicated.  Candidate for   Palivizumab therapy based on gestational age of less than 32 weeks gestation if   requires 28 or more days of oxygen therapy during hospitalization. Engerix 1741.  Plans:   complete immunizations on schedule    Synagis (5 monthly injections November - March)     14. Single liveborn infant, delivered by  (Z38.01)  Onset: 2021  Comments:  Per the American Academy of Pediatrics, prophylaxis against gonococcal   ophthalmia neonatorum and prophylaxis to prevent Vitamin K-dependent hemorrhagic   disease of the  are recommended at birth. Both administered following   delivery.    15. Restlessness and agitation (R45.1)  Onset: 2021  Medications:  1.Sucrose 24% solution 1 mL Oral  q 1h PRN painful procedure per protocol (1   unit/2 mL solution)  (Until Discontinued)  Weight: 1.73 kg Start Time:   2022 13:42 started on 2022  Comments:  Analgesia indicated for painful procedures as needed.  Plans:   24% Sucrose Solution orally PRN painful procedures per protocol     16. Cardiac murmur, unspecified (R01.1)  Onset: 2022  Comments:  Infant with grade II/VI murmur heard over the LSB and back.  Infant stable on RA   with good peripheral pulses.  Plans:  follow clinically for resolution of murmur, consider ECHO if not resolved by   discharge     17. 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 Exam 1/13   stage 0, by verbal report.  Plans:  ophthalmologic examination 2 weeks from previous evaluation     18. 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  Plans:  ophthalmologic examination 2 weeks from previous evaluation     19. Diaper dermatitis (L22)  Onset: 2021  Comments:  At risk due to gestational age.  Plans:   continue zinc oxide PRN     CARE PLAN  1. Parental Interaction  Onset: 2021  Comments  Mother updated at bedside. Discussed weight, feeds, tolerating crib, and   continue working on nippling.  Plans   continue family updates     2. Discharge Plans  Onset: 2021  Comments  The infant will be ready for discharge upon demonstration for at least 48 hours   each of the following: (1) physiologically mature and stable cardiorespiratory   function (2) sustained pattern of weight gain (3) maintenance of normal   thermoregulation in an open crib and (4) competent feedings without   cardiorespiratory compromise.    Rounds made/plan of care discussed with Antolin Ledezma Jr., MD  .    Preparer:GABI: CLEMENTINE GodinezP, APRN 1/25/2022 11:57 AM      Attending: GABI: Antolin Ledezma Jr., MD 1/25/2022 5:32 PM   clipper

## 2023-10-25 NOTE — PACU DISCHARGE NOTE - COMMENTS
Report given to 3E BABITA Mejia.  Pt laying flat as per orders, right groin dressing c/d/i, VSS.  Awaiting transport

## 2023-10-26 ENCOUNTER — TRANSCRIPTION ENCOUNTER (OUTPATIENT)
Age: 73
End: 2023-10-26

## 2023-10-26 VITALS
DIASTOLIC BLOOD PRESSURE: 66 MMHG | HEART RATE: 71 BPM | RESPIRATION RATE: 18 BRPM | SYSTOLIC BLOOD PRESSURE: 126 MMHG | TEMPERATURE: 98 F | OXYGEN SATURATION: 99 %

## 2023-10-26 LAB
ANION GAP SERPL CALC-SCNC: 4 MMOL/L — LOW (ref 5–17)
ANION GAP SERPL CALC-SCNC: 4 MMOL/L — LOW (ref 5–17)
BUN SERPL-MCNC: 11 MG/DL — SIGNIFICANT CHANGE UP (ref 7–23)
BUN SERPL-MCNC: 11 MG/DL — SIGNIFICANT CHANGE UP (ref 7–23)
CALCIUM SERPL-MCNC: 8.6 MG/DL — SIGNIFICANT CHANGE UP (ref 8.5–10.1)
CALCIUM SERPL-MCNC: 8.6 MG/DL — SIGNIFICANT CHANGE UP (ref 8.5–10.1)
CHLORIDE SERPL-SCNC: 107 MMOL/L — SIGNIFICANT CHANGE UP (ref 96–108)
CHLORIDE SERPL-SCNC: 107 MMOL/L — SIGNIFICANT CHANGE UP (ref 96–108)
CO2 SERPL-SCNC: 29 MMOL/L — SIGNIFICANT CHANGE UP (ref 22–31)
CO2 SERPL-SCNC: 29 MMOL/L — SIGNIFICANT CHANGE UP (ref 22–31)
CREAT SERPL-MCNC: 0.96 MG/DL — SIGNIFICANT CHANGE UP (ref 0.5–1.3)
CREAT SERPL-MCNC: 0.96 MG/DL — SIGNIFICANT CHANGE UP (ref 0.5–1.3)
EGFR: 84 ML/MIN/1.73M2 — SIGNIFICANT CHANGE UP
EGFR: 84 ML/MIN/1.73M2 — SIGNIFICANT CHANGE UP
GLUCOSE SERPL-MCNC: 96 MG/DL — SIGNIFICANT CHANGE UP (ref 70–99)
GLUCOSE SERPL-MCNC: 96 MG/DL — SIGNIFICANT CHANGE UP (ref 70–99)
HCT VFR BLD CALC: 38.3 % — LOW (ref 39–50)
HCT VFR BLD CALC: 38.3 % — LOW (ref 39–50)
HGB BLD-MCNC: 12.8 G/DL — LOW (ref 13–17)
HGB BLD-MCNC: 12.8 G/DL — LOW (ref 13–17)
MCHC RBC-ENTMCNC: 31.9 PG — SIGNIFICANT CHANGE UP (ref 27–34)
MCHC RBC-ENTMCNC: 31.9 PG — SIGNIFICANT CHANGE UP (ref 27–34)
MCHC RBC-ENTMCNC: 33.4 GM/DL — SIGNIFICANT CHANGE UP (ref 32–36)
MCHC RBC-ENTMCNC: 33.4 GM/DL — SIGNIFICANT CHANGE UP (ref 32–36)
MCV RBC AUTO: 95.5 FL — SIGNIFICANT CHANGE UP (ref 80–100)
MCV RBC AUTO: 95.5 FL — SIGNIFICANT CHANGE UP (ref 80–100)
PLATELET # BLD AUTO: 180 K/UL — SIGNIFICANT CHANGE UP (ref 150–400)
PLATELET # BLD AUTO: 180 K/UL — SIGNIFICANT CHANGE UP (ref 150–400)
POTASSIUM SERPL-MCNC: 4.1 MMOL/L — SIGNIFICANT CHANGE UP (ref 3.5–5.3)
POTASSIUM SERPL-MCNC: 4.1 MMOL/L — SIGNIFICANT CHANGE UP (ref 3.5–5.3)
POTASSIUM SERPL-SCNC: 4.1 MMOL/L — SIGNIFICANT CHANGE UP (ref 3.5–5.3)
POTASSIUM SERPL-SCNC: 4.1 MMOL/L — SIGNIFICANT CHANGE UP (ref 3.5–5.3)
RBC # BLD: 4.01 M/UL — LOW (ref 4.2–5.8)
RBC # BLD: 4.01 M/UL — LOW (ref 4.2–5.8)
RBC # FLD: 14.2 % — SIGNIFICANT CHANGE UP (ref 10.3–14.5)
RBC # FLD: 14.2 % — SIGNIFICANT CHANGE UP (ref 10.3–14.5)
SODIUM SERPL-SCNC: 140 MMOL/L — SIGNIFICANT CHANGE UP (ref 135–145)
SODIUM SERPL-SCNC: 140 MMOL/L — SIGNIFICANT CHANGE UP (ref 135–145)
WBC # BLD: 8.35 K/UL — SIGNIFICANT CHANGE UP (ref 3.8–10.5)
WBC # BLD: 8.35 K/UL — SIGNIFICANT CHANGE UP (ref 3.8–10.5)
WBC # FLD AUTO: 8.35 K/UL — SIGNIFICANT CHANGE UP (ref 3.8–10.5)
WBC # FLD AUTO: 8.35 K/UL — SIGNIFICANT CHANGE UP (ref 3.8–10.5)

## 2023-10-26 PROCEDURE — 93010 ELECTROCARDIOGRAM REPORT: CPT

## 2023-10-26 RX ORDER — ASPIRIN/CALCIUM CARB/MAGNESIUM 324 MG
1 TABLET ORAL
Qty: 0 | Refills: 0 | DISCHARGE
Start: 2023-10-26

## 2023-10-26 RX ADMIN — Medication 50 MICROGRAM(S): at 05:50

## 2023-10-26 RX ADMIN — PANTOPRAZOLE SODIUM 40 MILLIGRAM(S): 20 TABLET, DELAYED RELEASE ORAL at 05:50

## 2023-10-26 RX ADMIN — Medication 81 MILLIGRAM(S): at 10:40

## 2023-10-26 RX ADMIN — BUPROPION HYDROCHLORIDE 300 MILLIGRAM(S): 150 TABLET, EXTENDED RELEASE ORAL at 10:40

## 2023-10-26 NOTE — DISCHARGE NOTE NURSING/CASE MANAGEMENT/SOCIAL WORK - PATIENT PORTAL LINK FT
You can access the FollowMyHealth Patient Portal offered by [facility] by registering at the following website: [email or web address]. By joining [facility]’s FollowMyHealth portal, you will also be able to view your health information using other applications (apps) compatible with our system.

## 2023-10-26 NOTE — DISCHARGE NOTE NURSING/CASE MANAGEMENT/SOCIAL WORK - NSTRANSFEREYEGLASSESPAIRS_GEN_A_NUR
Detail Level: Zone Initiate Treatment: Bathing routine with Eucerin Eczema Relief cream, Kenalog Cream to all red itchy scaly areas four times a day. 1 pair

## 2023-10-26 NOTE — DISCHARGE NOTE NURSING/CASE MANAGEMENT/SOCIAL WORK - NSDCVIVACCINE_GEN_ALL_CORE_FT
influenza, injectable, quadrivalent, preservative free; 11-Sep-2018 12:57; Fabi Toledo (RN); [employer]; [id number] (Exp. Date: 30-Jun-2019); IntraMuscular; Deltoid Left.; 0.5 milliLiter(s); VIS (VIS Published: 07-Aug-2015, VIS Presented: 11-Sep-2018);

## 2023-10-26 NOTE — DISCHARGE NOTE PROVIDER - NSDCFUADDINST_GEN_ALL_CORE_FT
You can take shower in 24hrs. Keep the area clean and dry. Don't drive for 24hrs. No submersion of puncture site in bath tubs or pools for 1 week.  No strenuous activity or heavy lifting more than 10 pounds for 1 week. If you develop fever or swelling/redness/discharge from groin, call EP clinic  at  [phone].

## 2023-10-26 NOTE — DISCHARGE NOTE PROVIDER - NSDCCPCAREPLAN_GEN_ALL_CORE_FT
PRINCIPAL DISCHARGE DIAGNOSIS  Diagnosis: Paroxysmal SVT (supraventricular tachycardia)  Assessment and Plan of Treatment: -You had EP study & successful ablation of AVNRT by  on 10/25/23  -continue ASA 81mg daily for 4 weeks  -follow post-op instructions  -f/u wiht  on 12/4/23 @3pm      SECONDARY DISCHARGE DIAGNOSES  Diagnosis: Anxiety  Assessment and Plan of Treatment: -continue medications as directed  -f/u with PMD

## 2023-10-26 NOTE — DISCHARGE NOTE PROVIDER - PROVIDER TOKENS
PROVIDER:[TOKEN:[[id number]],SCHEDULEDAPPT:[12/04/2023],SCHEDULEDAPPTTIME:[03:00 PM],ESTABLISHEDPATIENT:[T]]

## 2023-10-26 NOTE — DISCHARGE NOTE PROVIDER - NSDCFUSCHEDAPPT_GEN_ALL_CORE_FT
Mauricio Quezada  [facility] Physician [facility]  ELECTROPH [address]  Scheduled Appointment: 12/04/2023    Liam Martin  [facility] Physician [facility]  DERM [address]  Scheduled Appointment: 01/10/2024    Javon Gambino  [facility] Physician [facility]  INTMED [address]  Scheduled Appointment: 01/16/2024

## 2023-10-26 NOTE — DISCHARGE NOTE PROVIDER - CARE PROVIDER_API CALL
Mauricio Quezada.  Cardiovascular Disease  [address] [place] [address]  Phone: [phone]  Fax: [phone]  Established Patient  Scheduled Appointment: 12/04/2023 03:00 PM

## 2023-10-26 NOTE — DISCHARGE NOTE PROVIDER - NSDCMRMEDTOKEN_GEN_ALL_CORE_FT
aspirin 81 mg oral tablet, chewable: 1 tab(s) orally once a day  buPROPion 300 mg/24 hours (XL) oral tablet, extended release: 1 tab(s) orally every 24 hours  clonazePAM 1 mg oral tablet: 0.5 tab(s) orally once a day (at bedtime)  famotidine 40 mg oral tablet: 1 tab(s) orally prn  Flovent  mcg/inh inhalation aerosol: 2 puff(s) inhaled once a day (in the morning)  hydrocodone-homatropine 5 mg-1.5 mg/5 mL oral syrup: 5 milliliter(s) orally prn  levothyroxine 50 mcg (0.05 mg) oral tablet: 1 tab(s) orally once a day  Lovaza 1000 mg oral capsule: 1 cap(s) orally once a day  MiraLax oral powder for reconstitution: 17 gram(s) orally once a day, As Needed  pantoprazole 40 mg oral delayed release tablet: 1 tab(s) orally once a day  tamsulosin 0.4 mg oral capsule: 1 cap(s) orally once a day (at bedtime)  Vitamin D3 2000 intl units oral tablet: 1 tab(s) orally once a day

## 2023-10-26 NOTE — DISCHARGE NOTE PROVIDER - HOSPITAL COURSE
72 yo M with PMHx of  anxiety, GERD, BPH, found to have symptomatic paroxysmal SVT.  s/p EPS/ablation of AVNRT on 10/25/23. Pt is resting in the bed, NAD. denies chest pain/SOB/palpitations. No event overnight.  Tele: SR 70-90's bpm  EKG: SR 66bpm, IN 198ms, QRS 96ms, QTC 398ms    ROS: All other ROS is negative unless indicated above.      Physical Exam:  Vital Signs Last 24 Hrs  T(C): 36.4 (26 Oct 2023 08:09), Max: 36.7 (25 Oct 2023 21:24)  T(F): 97.5 (26 Oct 2023 08:09), Max: 98.1 (25 Oct 2023 21:24)  HR: 71 (26 Oct 2023 08:09) (71 - 82)  BP: 126/66 (26 Oct 2023 08:09) (87/48 - 134/82)  BP(mean): 82 (25 Oct 2023 15:31) (82 - 82)  RR: 18 (26 Oct 2023 08:09) (15 - 18)  SpO2: 99% (26 Oct 2023 08:09) (96% - 100%)    Parameters below as of 26 Oct 2023 08:09  Patient On (Oxygen Delivery Method): room air      Constitutional: well developed,  no deformities and no acute distress    Neurological: Alert & Oriented x 3, MOSER, no focal deficits    HEENT: NC/AT, PERRLA, EOMI,  Neck supple.    Respiratory: CTA B/L, No wheezing/crackles/rhonchi    Cardiovascular: (+) S1 & S2, RRR, No m/r/g    Gastrointestinal: soft, NT, nondistended, (+) BS    Genitourinary: non distended bladder, voiding freely    Extremities: Rt groin : soft, NT, no swelling or hematoma    Skin:  normal skin color and pigmentation, no skin lesions      Allergies    antichlolinergics (Unknown)  oxybutynin (Other)  penicillin (Rash)  Lexapro (Other)  propofol (Other)  cortisone (Other)  clindamycin (Other)    Intolerances    Dilaudid (Other)  Percocet (Other)    MEDICATIONS  (STANDING):  aspirin  chewable 81 milliGRAM(s) Oral daily  buPROPion XL (24-Hour) . 300 milliGRAM(s) Oral daily  famotidine    Tablet 40 milliGRAM(s) Oral at bedtime  levothyroxine 50 MICROGram(s) Oral daily  pantoprazole    Tablet 40 milliGRAM(s) Oral before breakfast  tamsulosin 0.4 milliGRAM(s) Oral at bedtime    MEDICATIONS  (PRN):  aluminum hydroxide/magnesium hydroxide/simethicone Suspension 30 milliLiter(s) Oral every 4 hours PRN Dyspepsia  clonazePAM  Tablet 0.5 milliGRAM(s) Oral at bedtime PRN anxiety  melatonin 3 milliGRAM(s) Oral at bedtime PRN Insomnia    Labs: Basic Metabolic Panel (10.26.23 @ 06:59)    Sodium: 140 mmol/L   Potassium: 4.1 mmol/L   Chloride: 107 mmol/L   Carbon Dioxide: 29 mmol/L   Anion Gap: 4 mmol/L   Blood Urea Nitrogen: 11 mg/dL   Creatinine: 0.96 mg/dL   Glucose: 96 mg/dL   Calcium: 8.6 mg/dL   eGFR: 84                 CBC:         12.8   8.35  )-----------( 180      ( 26 Oct 2023 06:59 )             38.3       A/P: 72 yo M with PMHx of  anxiety, GERD, BPH, found to have symptomatic paroxysmal SVT.  s/p EPS/ablation of AVNRT on 10/25/23.   - continue ASA 81mg po daily for 4 weeks  -post d/c instruction reviewed with pt  - stable for d/c home today, f/u with  on 12/4/23 @3pm  Plan d/w pt//RN

## 2023-10-30 DIAGNOSIS — I47.10 SUPRAVENTRICULAR TACHYCARDIA, UNSPECIFIED: ICD-10-CM

## 2023-10-30 NOTE — POST DISCHARGE NOTE - DETAILS:
EPS/ ABLATION WITH DR FRANKLIN Patient wife called back states wants to go to gun range, participate in [employer] and want  to take the stairs at work. Spoke with Dr Quezada and relayed the message that patient should wait 2 weeks before he participates in the above.

## 2023-11-06 NOTE — ASU PREOP CHECKLIST - AS BP NONINV SITE
discomfort on Lateral gastroc. Discussed using foam roller or rolling pin at home to help with tightness on gastroc. She has  OTC orthotics  but has not worn them yet. Will continue with current POC. Patient will continue to benefit from skilled PT / OT services to modify and progress therapeutic interventions, analyze and address functional mobility deficits, analyze and address ROM deficits, analyze and address strength deficits, and analyze and address soft tissue restrictions to address functional deficits and attain remaining goals. Progress toward goals / Updated goals:  []  See Progress Note/Recertification      Short Term Goals: To be accomplished in 6-8 treatments. Pt will be independent with HEP to assist with progression of therapy and prevent delays/soreness. [x] Met [] Not met [] Partially met  Date:10/6 reports consistent compliance        Pt will report decreased overall frequency of L foot pain by 50% to assist with ability to complete functional mobility. [] Met [] Not met [x] Partially met  Date: 10/6 reports no changes/improvements thus far  10/16 reports \"maybe a little less painful\" upon first steps in the morning  10/23 20% better overall, pain 5/10 worst in past week  11/3 only had pain upon waking 3/7 days this week     Long Term Goals: To be accomplished in 12-16 treatments. Pt will have improved Foot & Ankle Disability Index to > 95%. [] Met [x] Not met [] Partially met Date: 10/23 improved from 77.9% to 86.5%     Pt will improve LLE flexibility and MMT with WNL's and symmetrical to R to improve functional mobility. [] Met [] Not met [x] Partially met Date: 10/23  11/3 improving but still tightness entire LLE compared to R     Pt will be able to run 1-2 miles with pain < 3/10.        [] Met [x] Not met [] Partially met Date: 10/23 not attempted yet     Patient will demonstrate LLE balance = RLE without shoes EO and EC.  [] Met [] Not met [x] Partially met left upper arm

## 2023-11-08 ENCOUNTER — TRANSCRIPTION ENCOUNTER (OUTPATIENT)
Age: 73
End: 2023-11-08

## 2023-11-08 ENCOUNTER — NON-APPOINTMENT (OUTPATIENT)
Age: 73
End: 2023-11-08

## 2023-11-13 ENCOUNTER — APPOINTMENT (OUTPATIENT)
Dept: DERMATOLOGY | Facility: CLINIC | Age: 73
End: 2023-11-13
Payer: MEDICARE

## 2023-11-13 PROBLEM — K21.9 GASTRO-ESOPHAGEAL REFLUX DISEASE WITHOUT ESOPHAGITIS: Chronic | Status: ACTIVE | Noted: 2023-10-18

## 2023-11-13 PROBLEM — E78.5 HYPERLIPIDEMIA, UNSPECIFIED: Chronic | Status: ACTIVE | Noted: 2023-10-18

## 2023-11-13 PROBLEM — Z87.19 PERSONAL HISTORY OF OTHER DISEASES OF THE DIGESTIVE SYSTEM: Chronic | Status: ACTIVE | Noted: 2023-10-18

## 2023-11-13 PROBLEM — I47.10 SUPRAVENTRICULAR TACHYCARDIA, UNSPECIFIED: Chronic | Status: ACTIVE | Noted: 2023-10-18

## 2023-11-13 PROBLEM — M70.72 OTHER BURSITIS OF HIP, LEFT HIP: Chronic | Status: ACTIVE | Noted: 2023-10-18

## 2023-11-13 PROBLEM — I49.3 VENTRICULAR PREMATURE DEPOLARIZATION: Chronic | Status: ACTIVE | Noted: 2023-10-18

## 2023-11-13 PROBLEM — G62.9 POLYNEUROPATHY, UNSPECIFIED: Chronic | Status: ACTIVE | Noted: 2023-10-18

## 2023-11-13 PROBLEM — N20.0 CALCULUS OF KIDNEY: Chronic | Status: ACTIVE | Noted: 2023-10-18

## 2023-11-13 PROCEDURE — 11104 PUNCH BX SKIN SINGLE LESION: CPT

## 2023-11-16 RX ORDER — MINOCYCLINE HYDROCHLORIDE 100 MG/1
100 CAPSULE ORAL TWICE DAILY
Qty: 14 | Refills: 0 | Status: ACTIVE | COMMUNITY
Start: 2023-11-16 | End: 1900-01-01

## 2023-11-20 LAB — BACTERIA TISS CULT: ABNORMAL

## 2023-11-21 ENCOUNTER — APPOINTMENT (OUTPATIENT)
Dept: DERMATOLOGY | Facility: CLINIC | Age: 73
End: 2023-11-21
Payer: MEDICARE

## 2023-11-21 DIAGNOSIS — L02.91 CUTANEOUS ABSCESS, UNSPECIFIED: ICD-10-CM

## 2023-11-21 PROCEDURE — 99213 OFFICE O/P EST LOW 20 MIN: CPT

## 2023-11-22 LAB — CORE LAB BIOPSY: NORMAL

## 2023-11-28 PROBLEM — L02.91 ABSCESS: Status: ACTIVE | Noted: 2023-10-05

## 2023-12-04 ENCOUNTER — NON-APPOINTMENT (OUTPATIENT)
Age: 73
End: 2023-12-04

## 2023-12-04 ENCOUNTER — APPOINTMENT (OUTPATIENT)
Dept: ELECTROPHYSIOLOGY | Facility: CLINIC | Age: 73
End: 2023-12-04
Payer: MEDICARE

## 2023-12-04 VITALS
SYSTOLIC BLOOD PRESSURE: 126 MMHG | DIASTOLIC BLOOD PRESSURE: 76 MMHG | OXYGEN SATURATION: 99 % | HEIGHT: 71 IN | HEART RATE: 66 BPM | WEIGHT: 175 LBS | RESPIRATION RATE: 16 BRPM | BODY MASS INDEX: 24.5 KG/M2

## 2023-12-04 DIAGNOSIS — Z98.890 OTHER SPECIFIED POSTPROCEDURAL STATES: ICD-10-CM

## 2023-12-04 DIAGNOSIS — I47.10 SUPRAVENTRICULAR TACHYCARDIA, UNSPECIFIED: ICD-10-CM

## 2023-12-04 DIAGNOSIS — Z86.79 OTHER SPECIFIED POSTPROCEDURAL STATES: ICD-10-CM

## 2023-12-04 PROCEDURE — 99213 OFFICE O/P EST LOW 20 MIN: CPT | Mod: 25

## 2023-12-04 PROCEDURE — 93000 ELECTROCARDIOGRAM COMPLETE: CPT

## 2023-12-05 ENCOUNTER — APPOINTMENT (OUTPATIENT)
Dept: DERMATOLOGY | Facility: CLINIC | Age: 73
End: 2023-12-05

## 2023-12-13 LAB — FUNGUS TISS CULT: NORMAL

## 2023-12-15 ENCOUNTER — RX RENEWAL (OUTPATIENT)
Age: 73
End: 2023-12-15

## 2024-01-07 ENCOUNTER — RX RENEWAL (OUTPATIENT)
Age: 74
End: 2024-01-07

## 2024-01-10 ENCOUNTER — APPOINTMENT (OUTPATIENT)
Dept: DERMATOLOGY | Facility: CLINIC | Age: 74
End: 2024-01-10
Payer: MEDICARE

## 2024-01-10 DIAGNOSIS — L81.4 OTHER MELANIN HYPERPIGMENTATION: ICD-10-CM

## 2024-01-10 DIAGNOSIS — D18.01 HEMANGIOMA OF SKIN AND SUBCUTANEOUS TISSUE: ICD-10-CM

## 2024-01-10 DIAGNOSIS — L82.1 OTHER SEBORRHEIC KERATOSIS: ICD-10-CM

## 2024-01-10 PROCEDURE — 99213 OFFICE O/P EST LOW 20 MIN: CPT

## 2024-01-10 RX ORDER — CLINDAMYCIN PHOSPHATE 10 MG/ML
1 SOLUTION TOPICAL TWICE DAILY
Qty: 1 | Refills: 3 | Status: ACTIVE | COMMUNITY
Start: 2024-01-10

## 2024-01-10 NOTE — HISTORY OF PRESENT ILLNESS
[FreeTextEntry1] : Patient presents for skin examination. [de-identified] : Denies new, changing, bleeding or tender lesions on the skin over the past few months.

## 2024-01-10 NOTE — PHYSICAL EXAM
[Alert] : alert [Oriented x 3] : ~L oriented x 3 [Well Nourished] : well nourished [Full Body Skin Exam Performed] : performed [FreeTextEntry3] : A full skin exam was performed including the scalp, face (including lips, ears, nose and eyes), neck, chest, abdomen, back, buttocks, upper extremities and lower extremities.  The patient declined examination of the genitalia.   The exam revealed the following benign growths: Seborrheic keratoses. Lentigines. Cherry angioma.

## 2024-01-13 NOTE — H&P PST ADULT - NSANTHTIREDRD_ENT_A_CORE
With resultant dysarthria and right sided weakness/ gait abnormality.  Currently at baseline.  Can continue statin, but hold Coumadin given GI bleed.     No

## 2024-02-01 ENCOUNTER — NON-APPOINTMENT (OUTPATIENT)
Age: 74
End: 2024-02-01

## 2024-02-06 ENCOUNTER — APPOINTMENT (OUTPATIENT)
Dept: UROLOGY | Facility: CLINIC | Age: 74
End: 2024-02-06
Payer: MEDICARE

## 2024-02-06 DIAGNOSIS — N28.1 CYST OF KIDNEY, ACQUIRED: ICD-10-CM

## 2024-02-06 DIAGNOSIS — N40.1 BENIGN PROSTATIC HYPERPLASIA WITH LOWER URINARY TRACT SYMPMS: ICD-10-CM

## 2024-02-06 DIAGNOSIS — R39.15 BENIGN PROSTATIC HYPERPLASIA WITH LOWER URINARY TRACT SYMPMS: ICD-10-CM

## 2024-02-06 PROCEDURE — 99212 OFFICE O/P EST SF 10 MIN: CPT

## 2024-02-06 RX ORDER — TAMSULOSIN HYDROCHLORIDE 0.4 MG/1
0.4 CAPSULE ORAL
Qty: 90 | Refills: 3 | Status: ACTIVE | COMMUNITY
Start: 2022-06-21 | End: 1900-01-01

## 2024-02-06 NOTE — ASSESSMENT
[FreeTextEntry1] : 72 yo M s/p TURP in 2019 with recurrence of LUTS on Tamsulosin. Recommended cystoscopy to evaluate bladder neck to r/o regrowth of prostate vs BNC. Patient would like to continue Tamsulosin for now.  For complex renal cyst, CT showed calcifications and septations in 6.8 cm cyst, but no apparent enhancement though was PO/IV single phase CT. Recommend surveillance with Renal US.  Bladder US to assess for post void residual  RTO annually or sooner PRN

## 2024-02-06 NOTE — HISTORY OF PRESENT ILLNESS
[FreeTextEntry1] : 69 yo M presents for follow up. He has history of BPH with LUTS. He poorly tolerated alpha blockers and  anticholinergics and is interested in TURP procedure. He also reports ED continues, with good rigidity but very poor duration. No improvement with sildenafil in the past. All symptoms have been ongoing for years. Nothing makes them better, nothing makes them worse. They are associated with nothing.   09/03/2019: Patient presents for follow up. He is s/p TURP and is here for TOV. He had penile pain with liz, improved with lubrication to urethra. Mild urgency.  No incontinence. No fevers, no chills, no nausea, no vomiting, no flank pain. Bladder was filled via Liz catheter by instilling 120 mL of sterile water. Liz catheter was then removed.  Pt then voided 100  mL. PVR 0  mL.   10/15/2019: Patient presents for follow up. He reports improvement of flow, frequency, and nocturia. Does report that at end of voiding, has hesitancy and has to force out urine. He also reports urgency. Has urge to void when he drinks 4 oz water. Scant few clots since last visit. No current hematuria, no dysuria, no frequency, no straining. No incontinence. No fevers, no chills, no nausea, no vomiting, no flank pain.   06/21/2022: Patient presents for follow up. He reports  symptoms have recurred to level prior to TURP, with weak stream, frequency, urgency, nocturia.   02/06/2024: Patient presents for follow up. He reports he is on Tamsulosin and he continues to have a weak stream but does not find it bothersome. No new  symptoms. Has annual PSA level with PCP and reports it was WNL most recently, will obtain results. No fevers, no chills, no nausea, no vomiting, no flank pain.

## 2024-02-06 NOTE — PHYSICAL EXAM
[General Appearance - Well Developed] : well developed [General Appearance - Well Nourished] : well nourished [Normal Appearance] : normal appearance [Well Groomed] : well groomed [General Appearance - In No Acute Distress] : no acute distress [Abdomen Soft] : soft [Abdomen Tenderness] : non-tender [Costovertebral Angle Tenderness] : no ~M costovertebral angle tenderness [Urinary Bladder Findings] : the bladder was normal on palpation [Skin Color & Pigmentation] : normal skin color and pigmentation [Edema] : no peripheral edema [] : no respiratory distress [Respiration, Rhythm And Depth] : normal respiratory rhythm and effort [Exaggerated Use Of Accessory Muscles For Inspiration] : no accessory muscle use [Oriented To Time, Place, And Person] : oriented to person, place, and time [Affect] : the affect was normal [Mood] : the mood was normal [Not Anxious] : not anxious [Normal Station and Gait] : the gait and station were normal for the patient's age [No Focal Deficits] : no focal deficits [No Palpable Adenopathy] : no palpable adenopathy [Prostate Tenderness] : the prostate was not tender [No Prostate Nodules] : no prostate nodules [Prostate Size ___ gm] : prostate size [unfilled] gm

## 2024-02-07 ENCOUNTER — APPOINTMENT (OUTPATIENT)
Dept: ORTHOPEDIC SURGERY | Facility: CLINIC | Age: 74
End: 2024-02-07
Payer: MEDICARE

## 2024-02-07 VITALS — HEIGHT: 71 IN | WEIGHT: 175 LBS | BODY MASS INDEX: 24.5 KG/M2

## 2024-02-07 PROCEDURE — 99203 OFFICE O/P NEW LOW 30 MIN: CPT

## 2024-02-09 NOTE — HISTORY OF PRESENT ILLNESS
[Sharp] : sharp [Sleep] : sleep [Meds] : meds [Walking/activity] : walking/activity [Sitting] : sitting [Lying in bed] : lying in bed [] : yes [FreeTextEntry7] : down Lt. leg [FreeTextEntry5] : 74 y/o M presents for NP eval of the Lt. hip today. Pt reports onset of hip pain this past fall with no associated trauma. He was last seen by Dr. Sanchez, given CSI which led to neg. rxn. Tylenol and Motrin as needed. Stiffness persists with prolonged sitting and when lying down.  [de-identified] : XR

## 2024-02-09 NOTE — ASSESSMENT
[FreeTextEntry1] : The patient is a 74 yo man presenting with left hip pain since September 2023. He denies trauma causing this pain. He denies paresthesias. The patient was seen by Dr. Sanchez and give a trochanteric bursal injection. This gave him no relief and hiccups for 24 hours. He reports that his pain is moderate and intermittent with use. It is located to the lateral hip and groin area with radiation to the thigh. The patient is using tylenol and motrin with some relief at night. He did have a lumbar fusion in 2018. He has pain with stairs and getting on socks and shoes.  Left hip exam: The patient presents in no apparent distress. Neurovascularly intact. Sensation intact to left lower extremity. No scars, cuts or abrasions. 2+ pulses to posterior tibialis. ROM is full and painless. + abductor tenderness. + groin pain. + lateral hip tenderness. - radiculopathy. + straight leg raise. 5/5 abductor strength. - pain with forced ER/IR.  X-rays that the patient brings with him from November 2023 are normal.  There is no evidence of arthritis, loose bodies tumors or masses.  Plan at this time is to get an MRI of the left hip.  Will decide on a course of action following the study.  Will consider physical therapy as well.

## 2024-02-13 ENCOUNTER — APPOINTMENT (OUTPATIENT)
Dept: INTERNAL MEDICINE | Facility: CLINIC | Age: 74
End: 2024-02-13

## 2024-02-28 ENCOUNTER — APPOINTMENT (OUTPATIENT)
Dept: MRI IMAGING | Facility: CLINIC | Age: 74
End: 2024-02-28
Payer: MEDICARE

## 2024-02-28 ENCOUNTER — OUTPATIENT (OUTPATIENT)
Dept: OUTPATIENT SERVICES | Facility: HOSPITAL | Age: 74
LOS: 1 days | End: 2024-02-28
Payer: COMMERCIAL

## 2024-02-28 ENCOUNTER — APPOINTMENT (OUTPATIENT)
Dept: ULTRASOUND IMAGING | Facility: CLINIC | Age: 74
End: 2024-02-28
Payer: MEDICARE

## 2024-02-28 ENCOUNTER — RESULT REVIEW (OUTPATIENT)
Age: 74
End: 2024-02-28

## 2024-02-28 DIAGNOSIS — Z98.890 OTHER SPECIFIED POSTPROCEDURAL STATES: Chronic | ICD-10-CM

## 2024-02-28 DIAGNOSIS — N28.1 CYST OF KIDNEY, ACQUIRED: ICD-10-CM

## 2024-02-28 DIAGNOSIS — Z41.9 ENCOUNTER FOR PROCEDURE FOR PURPOSES OTHER THAN REMEDYING HEALTH STATE, UNSPECIFIED: Chronic | ICD-10-CM

## 2024-02-28 DIAGNOSIS — Z98.1 ARTHRODESIS STATUS: Chronic | ICD-10-CM

## 2024-02-28 DIAGNOSIS — Z90.79 ACQUIRED ABSENCE OF OTHER GENITAL ORGAN(S): Chronic | ICD-10-CM

## 2024-02-28 DIAGNOSIS — M70.62 TROCHANTERIC BURSITIS, LEFT HIP: ICD-10-CM

## 2024-02-28 PROCEDURE — 76770 US EXAM ABDO BACK WALL COMP: CPT | Mod: 26

## 2024-02-28 PROCEDURE — 76770 US EXAM ABDO BACK WALL COMP: CPT

## 2024-02-28 PROCEDURE — 73721 MRI JNT OF LWR EXTRE W/O DYE: CPT

## 2024-02-28 PROCEDURE — 73721 MRI JNT OF LWR EXTRE W/O DYE: CPT | Mod: 26,LT

## 2024-03-01 NOTE — ASU PATIENT PROFILE, ADULT - ALCOHOL USE HISTORY SINGLE SELECT
Expected Date Of Service: 08/01/2022 Performing Laboratory: -[id number] Bill For Surgical Tray: no Billing Type: Third-Party Bill never

## 2024-03-05 ENCOUNTER — APPOINTMENT (OUTPATIENT)
Dept: INTERNAL MEDICINE | Facility: CLINIC | Age: 74
End: 2024-03-05

## 2024-03-06 ENCOUNTER — NON-APPOINTMENT (OUTPATIENT)
Age: 74
End: 2024-03-06

## 2024-03-16 ENCOUNTER — APPOINTMENT (OUTPATIENT)
Dept: MRI IMAGING | Facility: CLINIC | Age: 74
End: 2024-03-16
Payer: COMMERCIAL

## 2024-03-16 ENCOUNTER — OUTPATIENT (OUTPATIENT)
Dept: OUTPATIENT SERVICES | Facility: HOSPITAL | Age: 74
LOS: 1 days | End: 2024-03-16
Payer: COMMERCIAL

## 2024-03-16 DIAGNOSIS — M48.061 SPINAL STENOSIS, LUMBAR REGION WITHOUT NEUROGENIC CLAUDICATION: ICD-10-CM

## 2024-03-16 DIAGNOSIS — Z98.890 OTHER SPECIFIED POSTPROCEDURAL STATES: Chronic | ICD-10-CM

## 2024-03-16 DIAGNOSIS — Z41.9 ENCOUNTER FOR PROCEDURE FOR PURPOSES OTHER THAN REMEDYING HEALTH STATE, UNSPECIFIED: Chronic | ICD-10-CM

## 2024-03-16 DIAGNOSIS — M54.16 RADICULOPATHY, LUMBAR REGION: ICD-10-CM

## 2024-03-16 DIAGNOSIS — Z98.1 ARTHRODESIS STATUS: ICD-10-CM

## 2024-03-16 DIAGNOSIS — Z90.79 ACQUIRED ABSENCE OF OTHER GENITAL ORGAN(S): Chronic | ICD-10-CM

## 2024-03-16 DIAGNOSIS — Z98.1 ARTHRODESIS STATUS: Chronic | ICD-10-CM

## 2024-03-16 DIAGNOSIS — Z00.8 ENCOUNTER FOR OTHER GENERAL EXAMINATION: ICD-10-CM

## 2024-03-16 PROCEDURE — 72148 MRI LUMBAR SPINE W/O DYE: CPT | Mod: 26

## 2024-03-16 PROCEDURE — 72148 MRI LUMBAR SPINE W/O DYE: CPT

## 2024-03-21 ENCOUNTER — OUTPATIENT (OUTPATIENT)
Dept: OUTPATIENT SERVICES | Facility: HOSPITAL | Age: 74
LOS: 1 days | Discharge: HOME | End: 2024-03-21
Payer: MEDICARE

## 2024-03-21 ENCOUNTER — RESULT REVIEW (OUTPATIENT)
Age: 74
End: 2024-03-21

## 2024-03-21 ENCOUNTER — APPOINTMENT (OUTPATIENT)
Dept: GASTROENTEROLOGY | Facility: HOSPITAL | Age: 74
End: 2024-03-21

## 2024-03-21 VITALS
WEIGHT: 169.98 LBS | DIASTOLIC BLOOD PRESSURE: 85 MMHG | SYSTOLIC BLOOD PRESSURE: 139 MMHG | RESPIRATION RATE: 17 BRPM | HEIGHT: 71 IN | OXYGEN SATURATION: 97 % | TEMPERATURE: 97 F | HEART RATE: 90 BPM

## 2024-03-21 DIAGNOSIS — Z98.890 OTHER SPECIFIED POSTPROCEDURAL STATES: Chronic | ICD-10-CM

## 2024-03-21 DIAGNOSIS — R13.10 DYSPHAGIA, UNSPECIFIED: ICD-10-CM

## 2024-03-21 DIAGNOSIS — Z98.1 ARTHRODESIS STATUS: Chronic | ICD-10-CM

## 2024-03-21 DIAGNOSIS — Z41.9 ENCOUNTER FOR PROCEDURE FOR PURPOSES OTHER THAN REMEDYING HEALTH STATE, UNSPECIFIED: Chronic | ICD-10-CM

## 2024-03-21 DIAGNOSIS — Z90.79 ACQUIRED ABSENCE OF OTHER GENITAL ORGAN(S): Chronic | ICD-10-CM

## 2024-03-21 DIAGNOSIS — K21.9 GASTRO-ESOPHAGEAL REFLUX DISEASE WITHOUT ESOPHAGITIS: ICD-10-CM

## 2024-03-21 PROCEDURE — 88312 SPECIAL STAINS GROUP 1: CPT | Mod: 26

## 2024-03-21 PROCEDURE — 88305 TISSUE EXAM BY PATHOLOGIST: CPT | Mod: 26

## 2024-03-21 PROCEDURE — 88312 SPECIAL STAINS GROUP 1: CPT

## 2024-03-21 PROCEDURE — 88305 TISSUE EXAM BY PATHOLOGIST: CPT

## 2024-03-21 PROCEDURE — 43239 EGD BIOPSY SINGLE/MULTIPLE: CPT

## 2024-03-21 RX ORDER — BUPROPION HYDROCHLORIDE 150 MG/1
1 TABLET, EXTENDED RELEASE ORAL
Qty: 0 | Refills: 0 | DISCHARGE

## 2024-03-21 RX ORDER — OMEGA-3 ACID ETHYL ESTERS 1 G
1 CAPSULE ORAL
Refills: 0 | DISCHARGE

## 2024-03-21 RX ORDER — HYDROCODONE BITARTRATE AND HOMATROPINE METHYLBROMIDE 5; 1.5 MG/5ML; MG/5ML
5 SOLUTION ORAL
Refills: 0 | DISCHARGE

## 2024-03-21 RX ORDER — TAMSULOSIN HYDROCHLORIDE 0.4 MG/1
1 CAPSULE ORAL
Qty: 0 | Refills: 0 | DISCHARGE

## 2024-03-21 RX ORDER — POLYETHYLENE GLYCOL 3350 17 G/17G
17 POWDER, FOR SOLUTION ORAL
Qty: 0 | Refills: 0 | DISCHARGE

## 2024-03-21 RX ORDER — CHOLECALCIFEROL (VITAMIN D3) 125 MCG
1 CAPSULE ORAL
Qty: 0 | Refills: 0 | DISCHARGE

## 2024-03-21 RX ORDER — CLONAZEPAM 1 MG
0.5 TABLET ORAL
Qty: 0 | Refills: 0 | DISCHARGE

## 2024-03-21 RX ORDER — LEVOTHYROXINE SODIUM 125 MCG
1 TABLET ORAL
Refills: 0 | DISCHARGE

## 2024-03-21 RX ORDER — PANTOPRAZOLE SODIUM 20 MG/1
1 TABLET, DELAYED RELEASE ORAL
Qty: 0 | Refills: 0 | DISCHARGE

## 2024-03-21 RX ORDER — FAMOTIDINE 10 MG/ML
1 INJECTION INTRAVENOUS
Refills: 0 | DISCHARGE

## 2024-03-21 RX ORDER — FLUTICASONE PROPIONATE 220 MCG
2 AEROSOL WITH ADAPTER (GRAM) INHALATION
Qty: 0 | Refills: 0 | DISCHARGE

## 2024-03-21 NOTE — ASU PATIENT PROFILE, ADULT - FALL HARM RISK - UNIVERSAL INTERVENTIONS
Bed in lowest position, wheels locked, appropriate side rails in place/Call bell, personal items and telephone in reach/Instruct patient to call for assistance before getting out of bed or chair/Non-slip footwear when patient is out of bed/[place] to call system/Physically safe environment - no spills, clutter or unnecessary equipment/Purposeful Proactive Rounding/Room/bathroom lighting operational, light cord in reach

## 2024-03-22 LAB — SURGICAL PATHOLOGY STUDY: SIGNIFICANT CHANGE UP

## 2024-03-26 ENCOUNTER — RESULT CHARGE (OUTPATIENT)
Age: 74
End: 2024-03-26

## 2024-03-27 ENCOUNTER — NON-APPOINTMENT (OUTPATIENT)
Age: 74
End: 2024-03-27

## 2024-03-27 ENCOUNTER — APPOINTMENT (OUTPATIENT)
Dept: INTERNAL MEDICINE | Facility: CLINIC | Age: 74
End: 2024-03-27
Payer: MEDICARE

## 2024-03-27 VITALS
SYSTOLIC BLOOD PRESSURE: 100 MMHG | BODY MASS INDEX: 23.94 KG/M2 | RESPIRATION RATE: 16 BRPM | TEMPERATURE: 98.8 F | HEART RATE: 73 BPM | DIASTOLIC BLOOD PRESSURE: 64 MMHG | HEIGHT: 71 IN | WEIGHT: 171 LBS | OXYGEN SATURATION: 97 %

## 2024-03-27 DIAGNOSIS — I49.3 VENTRICULAR PREMATURE DEPOLARIZATION: ICD-10-CM

## 2024-03-27 DIAGNOSIS — R91.1 SOLITARY PULMONARY NODULE: ICD-10-CM

## 2024-03-27 DIAGNOSIS — I47.10 SUPRAVENTRICULAR TACHYCARDIA, UNSPECIFIED: ICD-10-CM

## 2024-03-27 DIAGNOSIS — R05.3 CHRONIC COUGH: ICD-10-CM

## 2024-03-27 DIAGNOSIS — K29.50 UNSPECIFIED CHRONIC GASTRITIS WITHOUT BLEEDING: ICD-10-CM

## 2024-03-27 DIAGNOSIS — E78.5 HYPERLIPIDEMIA, UNSPECIFIED: ICD-10-CM

## 2024-03-27 DIAGNOSIS — Z88.1 ALLERGY STATUS TO OTHER ANTIBIOTIC AGENTS STATUS: ICD-10-CM

## 2024-03-27 DIAGNOSIS — J45.909 UNSPECIFIED ASTHMA, UNCOMPLICATED: ICD-10-CM

## 2024-03-27 DIAGNOSIS — R09.82 POSTNASAL DRIP: ICD-10-CM

## 2024-03-27 DIAGNOSIS — J45.991 COUGH VARIANT ASTHMA: ICD-10-CM

## 2024-03-27 DIAGNOSIS — J98.4 OTHER DISORDERS OF LUNG: ICD-10-CM

## 2024-03-27 DIAGNOSIS — K21.9 GASTRO-ESOPHAGEAL REFLUX DISEASE WITHOUT ESOPHAGITIS: ICD-10-CM

## 2024-03-27 DIAGNOSIS — Z88.0 ALLERGY STATUS TO PENICILLIN: ICD-10-CM

## 2024-03-27 DIAGNOSIS — K31.7 POLYP OF STOMACH AND DUODENUM: ICD-10-CM

## 2024-03-27 PROCEDURE — 95012 NITRIC OXIDE EXP GAS DETER: CPT

## 2024-03-27 PROCEDURE — 99214 OFFICE O/P EST MOD 30 MIN: CPT | Mod: 25

## 2024-03-27 PROCEDURE — 94060 EVALUATION OF WHEEZING: CPT

## 2024-03-27 NOTE — ADDENDUM
[FreeTextEntry1] : I, Maisha Doshi, am scribing for and in the presence of Dr. Gambino in the following sections HISTORY OF PRESENT ILLNESS; REVIEW OF SYSTEMS; VITAL SIGNS; ASSESSMENT/PLAN.

## 2024-03-27 NOTE — PLAN
[FreeTextEntry1] : 1. Continue current medications as outlined above.  2. Follow up in 6 months with PFT.   3. Routine medical follow-up with his primary care physician, Dr. Baptiste.   4. Cardiovascular exercise as tolerated.

## 2024-03-27 NOTE — PHYSICAL EXAM
[Well-Appearing] : well-appearing [Well Nourished] : well nourished [Normal Oropharynx] : the oropharynx was normal [No JVD] : no jugular venous distention [Supple] : supple [Clear to Auscultation] : lungs were clear to auscultation bilaterally [Regular Rhythm] : with a regular rhythm [Normal S1, S2] : normal S1 and S2 [No Extremity Clubbing/Cyanosis] : no extremity clubbing/cyanosis [No Edema] : there was no peripheral edema [Normal Bowel Sounds] : normal bowel sounds [Soft] : abdomen soft [Normal Supraclavicular Nodes] : no supraclavicular lymphadenopathy [Normal Posterior Cervical Nodes] : no posterior cervical lymphadenopathy [Normal Anterior Cervical Nodes] : no anterior cervical lymphadenopathy [No CVA Tenderness] : no CVA  tenderness [No Rash] : no rash [No Respiratory Distress] : no respiratory distress  [No Accessory Muscle Use] : no accessory muscle use [Normal Rate] : normal rate  [Normal Affect] : the affect was normal [Normal Insight/Judgement] : insight and judgment were intact

## 2024-03-27 NOTE — DATA REVIEWED
[FreeTextEntry1] : Spirometric analysis is within normal limits.  Mild to moderate bronchodilator reactivity is demonstrated.  The fractional excretion of nitric oxide is normal at 15

## 2024-03-27 NOTE — HISTORY OF PRESENT ILLNESS
[de-identified] : The patient is feeling well from a pulmonary standpoint. He has a history of airway hyperreactivity, asthma, and a chronic cough. He is maintained on Flovent 220 mcg/act taking 2 puffs once per day with an Aerochamber. His dosage was cut down from taking 2 puffs BID to 2 puffs once per day at a prior visit. He complains of occasional hoarseness from the Flovent, although it has gotten better since reducing the dosage and instituting the AeroChamber. He denies any wheeze or SOB. He notes that after extended periods of talking, he has a dry cough. There has been no sputum production or wheeze. There have been no recent exacerbations of the patient's asthma.   Additionally, he has seasonal allergies and is not taking any OTC medications for this issue. He denies any rhinitis, postnasal drip or itchy/watery eyes.   The patient is currently undergoing PT due to having left hip pain since September 2023. Additionally, he did have a lumbar fusion in 2018. He notes that he does not have an exercise routine due to pain and PT. There have been no fevers, chills, or night sweats. There have been no other acute constitutional symptoms. He comes in for this assessment.  [FreeTextEntry1] :  The patient comes in for a follow-up pulmonary evaluation.

## 2024-04-17 ENCOUNTER — APPOINTMENT (OUTPATIENT)
Dept: ORTHOPEDIC SURGERY | Facility: CLINIC | Age: 74
End: 2024-04-17
Payer: MEDICARE

## 2024-04-17 VITALS — WEIGHT: 171 LBS | HEIGHT: 71 IN | BODY MASS INDEX: 23.94 KG/M2

## 2024-04-17 DIAGNOSIS — M70.62 TROCHANTERIC BURSITIS, LEFT HIP: ICD-10-CM

## 2024-04-17 DIAGNOSIS — M16.12 UNILATERAL PRIMARY OSTEOARTHRITIS, LEFT HIP: ICD-10-CM

## 2024-04-17 PROCEDURE — 99213 OFFICE O/P EST LOW 20 MIN: CPT

## 2024-04-17 RX ORDER — ROSUVASTATIN CALCIUM 5 MG/1
TABLET, FILM COATED ORAL
Refills: 0 | Status: ACTIVE | COMMUNITY

## 2024-04-17 NOTE — ASSESSMENT
[FreeTextEntry1] : The patient is here for MRI follow up of his left hip. He has been doing PT with good relief but has pain at EROM and certain activities. He denies trauma causing this pain. He denies paresthesias. The patient was seen by Dr. Sanchez and give a trochanteric bursal injection. This gave him no relief and hiccups for 24 hours. He reports that his pain is moderate and intermittent with use. It is located to the lateral hip and groin area with radiation to the thigh. The patient is using tylenol and motrin with some relief at night. He did have a lumbar fusion in 2018. He has pain with stairs and getting on socks and shoes.  Left hip exam: The patient presents in no apparent distress. Neurovascularly intact. Sensation intact to left lower extremity. No scars, cuts or abrasions. 2+ pulses to posterior tibialis. ROM is full and painless. + abductor tenderness. + groin pain. + lateral hip tenderness. - radiculopathy. + straight leg raise. 5/5 abductor strength. - pain with forced ER/IR.

## 2024-04-17 NOTE — PHYSICAL THERAPY INITIAL EVALUATION ADULT - THERAPY FREQUENCY, PT EVAL
like heart disease, depression, chronic pain, and cancer.  How do you practice mindfulness?  To be mindful is to pay attention, to be present, and to be accepting. Like any new skill or habit, being mindful can take practice.  When you're mindful, you do just one thing and you pay close attention to that one thing. For example, you may sit quietly and notice your emotions or how your food tastes and smells.  When you're present, you focus on the things that are happening right now. You let go of your thoughts about the past and the future. When you dwell on the past or the future, you miss moments that can heal and strengthen you. You may miss moments like hearing a child laugh or seeing a friendly face when you think you're all alone.  When you're accepting, you don't  the present moment. Instead you accept your thoughts and feelings as they come.  You can practice anytime, anywhere, and in any way you choose. You can practice in many ways. Here are a few ideas:  While doing your chores, like washing the dishes, let your mind focus on what's in your hand. What does the dish feel like? Is the water warm or cold?  Go outside and take a few deep breaths. What is the air like? Is it warm or cold?  When you can, take some time at the start of your day to sit alone and think.  Take a slow walk by yourself. Count your steps while you breathe in and out.  Try yoga breathing exercises, stretches, and poses to strengthen and relax your muscles.  At work, if you can, try to stop for a few moments each hour. Note how your body feels. Let yourself regroup and let your mind settle before you return to what you were doing.  If you struggle with anxiety or \"worry thoughts,\" imagine your mind as a blue uche and your worry thoughts as clouds. Now imagine those worry thoughts floating across your mind's uche. Just let them pass by as you watch.  Follow-up care is a key part of your treatment and safety. Be sure to make and go to  5-7x/week

## 2024-04-17 NOTE — HISTORY OF PRESENT ILLNESS
[Sharp] : sharp [Sleep] : sleep [Meds] : meds [Walking/activity] : walking/activity [Sitting] : sitting [Lying in bed] : lying in bed [] : yes [FreeTextEntry5] : 74 y/o M presents for f/u eval of the Lt. hip today. Pt reports of some improvement since last visit. PT 2x weekly.  [FreeTextEntry7] : down Lt. leg [de-identified] : MRI

## 2024-04-19 PROBLEM — M70.62 TROCHANTERIC BURSITIS OF LEFT HIP: Status: ACTIVE | Noted: 2024-02-07

## 2024-04-25 ENCOUNTER — NON-APPOINTMENT (OUTPATIENT)
Age: 74
End: 2024-04-25

## 2024-04-29 ENCOUNTER — RX RENEWAL (OUTPATIENT)
Age: 74
End: 2024-04-29

## 2024-04-29 NOTE — ASU DISCHARGE PLAN (ADULT/PEDIATRIC) - ***IN THE EVENT THAT YOU DEVELOP A COMPLICATION AND YOU ARE UNABLE TO REACH YOUR OWN PHYSICIAN, YOU MAY CONTACT:
Left message to call [facility] to schedule a follow-up appointment for recent visit to [facility] by calling ph: [phone] press option 6.     Sharonda Garner RN  [employer]   Occupational Health   Ph: [phone]    Statement Selected

## 2024-05-07 ENCOUNTER — OFFICE (OUTPATIENT)
Dept: URBAN - METROPOLITAN AREA CLINIC 102 | Facility: CLINIC | Age: 74
Setting detail: OPHTHALMOLOGY
End: 2024-05-07
Payer: MEDICARE

## 2024-05-07 DIAGNOSIS — H16.222: ICD-10-CM

## 2024-05-07 DIAGNOSIS — H01.001: ICD-10-CM

## 2024-05-07 DIAGNOSIS — H43.393: ICD-10-CM

## 2024-05-07 DIAGNOSIS — H02.831: ICD-10-CM

## 2024-05-07 DIAGNOSIS — H01.004: ICD-10-CM

## 2024-05-07 DIAGNOSIS — H40.033: ICD-10-CM

## 2024-05-07 DIAGNOSIS — H35.361: ICD-10-CM

## 2024-05-07 DIAGNOSIS — H25.13: ICD-10-CM

## 2024-05-07 DIAGNOSIS — H16.221: ICD-10-CM

## 2024-05-07 DIAGNOSIS — H16.223: ICD-10-CM

## 2024-05-07 DIAGNOSIS — H01.002: ICD-10-CM

## 2024-05-07 DIAGNOSIS — H01.005: ICD-10-CM

## 2024-05-07 PROCEDURE — 92020 GONIOSCOPY: CPT | Performed by: OPHTHALMOLOGY

## 2024-05-07 PROCEDURE — 92014 COMPRE OPH EXAM EST PT 1/>: CPT | Performed by: OPHTHALMOLOGY

## 2024-05-07 PROCEDURE — 92250 FUNDUS PHOTOGRAPHY W/I&R: CPT | Performed by: OPHTHALMOLOGY

## 2024-05-07 ASSESSMENT — CONFRONTATIONAL VISUAL FIELD TEST (CVF)
OS_FINDINGS: FULL
OD_FINDINGS: FULL

## 2024-05-07 ASSESSMENT — LID POSITION - DERMATOCHALASIS
OD_DERMATOCHALASIS: 1+ 2+
OS_DERMATOCHALASIS: 1+ 2+

## 2024-05-07 ASSESSMENT — LID EXAM ASSESSMENTS
OD_BLEPHARITIS: RLL RUL T
OS_BLEPHARITIS: LLL LUL T

## 2024-05-08 RX ORDER — METHYLPREDNISOLONE 4 MG/1
4 TABLET ORAL
Qty: 1 | Refills: 1 | Status: DISCONTINUED | COMMUNITY
Start: 2023-09-18 | End: 2024-05-08

## 2024-05-08 RX ORDER — FLUTICASONE FUROATE 200 UG/1
200 POWDER RESPIRATORY (INHALATION)
Qty: 1 | Refills: 11 | Status: DISCONTINUED | COMMUNITY
Start: 2024-04-29 | End: 2024-05-08

## 2024-05-08 RX ORDER — FLUTICASONE PROPIONATE 220 UG/1
220 AEROSOL, METERED RESPIRATORY (INHALATION) TWICE DAILY
Qty: 3 | Refills: 2 | Status: DISCONTINUED | COMMUNITY
Start: 2021-04-27 | End: 2024-05-08

## 2024-05-15 RX ORDER — BECLOMETHASONE DIPROPIONATE HFA 80 UG/1
80 AEROSOL, METERED RESPIRATORY (INHALATION)
Qty: 3 | Refills: 3 | Status: ACTIVE | COMMUNITY
Start: 2024-05-08 | End: 1900-01-01

## 2024-06-13 ENCOUNTER — NON-APPOINTMENT (OUTPATIENT)
Age: 74
End: 2024-06-13

## 2024-06-17 RX ORDER — MOMETASONE FUROATE 1 MG/ML
0.1 SOLUTION TOPICAL
Qty: 60 | Refills: 2 | Status: ACTIVE | COMMUNITY
Start: 2024-06-17 | End: 1900-01-01

## 2024-06-17 RX ORDER — FLURANDRENOLIDE 0.5 MG/ML
0.05 LOTION TOPICAL TWICE DAILY
Qty: 1 | Refills: 1 | Status: DISCONTINUED | COMMUNITY
Start: 2019-12-11 | End: 2024-06-17

## 2024-06-21 RX ORDER — PANTOPRAZOLE 40 MG/1
40 TABLET, DELAYED RELEASE ORAL
Qty: 100 | Refills: 0 | Status: ACTIVE | COMMUNITY
Start: 2023-07-26 | End: 1900-01-01

## 2024-07-03 ENCOUNTER — APPOINTMENT (OUTPATIENT)
Dept: ORTHOPEDIC SURGERY | Facility: CLINIC | Age: 74
End: 2024-07-03
Payer: MEDICARE

## 2024-07-03 VITALS — BODY MASS INDEX: 23.94 KG/M2 | HEIGHT: 71 IN | WEIGHT: 171 LBS

## 2024-07-03 DIAGNOSIS — M16.12 UNILATERAL PRIMARY OSTEOARTHRITIS, LEFT HIP: ICD-10-CM

## 2024-07-03 DIAGNOSIS — M70.62 TROCHANTERIC BURSITIS, LEFT HIP: ICD-10-CM

## 2024-07-03 PROCEDURE — 99214 OFFICE O/P EST MOD 30 MIN: CPT

## 2024-07-03 RX ORDER — PREGABALIN 300 MG/1
CAPSULE ORAL
Refills: 0 | Status: ACTIVE | COMMUNITY

## 2024-07-16 ENCOUNTER — OUTPATIENT (OUTPATIENT)
Dept: OUTPATIENT SERVICES | Facility: HOSPITAL | Age: 74
LOS: 1 days | End: 2024-07-16
Payer: MEDICARE

## 2024-07-16 VITALS
RESPIRATION RATE: 16 BRPM | WEIGHT: 169.09 LBS | HEIGHT: 71 IN | SYSTOLIC BLOOD PRESSURE: 107 MMHG | TEMPERATURE: 98 F | HEART RATE: 70 BPM | OXYGEN SATURATION: 99 % | DIASTOLIC BLOOD PRESSURE: 70 MMHG

## 2024-07-16 DIAGNOSIS — Z98.890 OTHER SPECIFIED POSTPROCEDURAL STATES: Chronic | ICD-10-CM

## 2024-07-16 DIAGNOSIS — Z41.9 ENCOUNTER FOR PROCEDURE FOR PURPOSES OTHER THAN REMEDYING HEALTH STATE, UNSPECIFIED: Chronic | ICD-10-CM

## 2024-07-16 DIAGNOSIS — Z90.79 ACQUIRED ABSENCE OF OTHER GENITAL ORGAN(S): Chronic | ICD-10-CM

## 2024-07-16 DIAGNOSIS — Z01.818 ENCOUNTER FOR OTHER PREPROCEDURAL EXAMINATION: ICD-10-CM

## 2024-07-16 DIAGNOSIS — Z98.1 ARTHRODESIS STATUS: Chronic | ICD-10-CM

## 2024-07-16 PROBLEM — M75.00 ADHESIVE CAPSULITIS OF UNSPECIFIED SHOULDER: Chronic | Status: INACTIVE | Noted: 2019-08-20 | Resolved: 2024-07-16

## 2024-07-16 PROBLEM — G62.9 POLYNEUROPATHY, UNSPECIFIED: Chronic | Status: INACTIVE | Noted: 2023-10-18 | Resolved: 2024-07-16

## 2024-07-16 PROBLEM — K21.9 GASTRO-ESOPHAGEAL REFLUX DISEASE WITHOUT ESOPHAGITIS: Chronic | Status: INACTIVE | Noted: 2018-08-28 | Resolved: 2024-07-16

## 2024-07-16 PROBLEM — Z87.19 PERSONAL HISTORY OF OTHER DISEASES OF THE DIGESTIVE SYSTEM: Chronic | Status: INACTIVE | Noted: 2023-10-18 | Resolved: 2024-07-16

## 2024-07-16 LAB
ANION GAP SERPL CALC-SCNC: 5 MMOL/L — SIGNIFICANT CHANGE UP (ref 5–17)
BASOPHILS # BLD AUTO: 0.11 K/UL — SIGNIFICANT CHANGE UP (ref 0–0.2)
BASOPHILS NFR BLD AUTO: 1.7 % — SIGNIFICANT CHANGE UP (ref 0–2)
BUN SERPL-MCNC: 15 MG/DL — SIGNIFICANT CHANGE UP (ref 7–23)
CALCIUM SERPL-MCNC: 9.9 MG/DL — SIGNIFICANT CHANGE UP (ref 8.5–10.1)
CHLORIDE SERPL-SCNC: 106 MMOL/L — SIGNIFICANT CHANGE UP (ref 96–108)
CO2 SERPL-SCNC: 29 MMOL/L — SIGNIFICANT CHANGE UP (ref 22–31)
CREAT SERPL-MCNC: 1 MG/DL — SIGNIFICANT CHANGE UP (ref 0.5–1.3)
EGFR: 79 ML/MIN/1.73M2 — SIGNIFICANT CHANGE UP
EOSINOPHIL # BLD AUTO: 0.16 K/UL — SIGNIFICANT CHANGE UP (ref 0–0.5)
EOSINOPHIL NFR BLD AUTO: 2.5 % — SIGNIFICANT CHANGE UP (ref 0–6)
GLUCOSE SERPL-MCNC: 92 MG/DL — SIGNIFICANT CHANGE UP (ref 70–99)
HCT VFR BLD CALC: 44.2 % — SIGNIFICANT CHANGE UP (ref 39–50)
HGB BLD-MCNC: 14.6 G/DL — SIGNIFICANT CHANGE UP (ref 13–17)
IMM GRANULOCYTES NFR BLD AUTO: 0.3 % — SIGNIFICANT CHANGE UP (ref 0–0.9)
LYMPHOCYTES # BLD AUTO: 1.12 K/UL — SIGNIFICANT CHANGE UP (ref 1–3.3)
LYMPHOCYTES # BLD AUTO: 17.6 % — SIGNIFICANT CHANGE UP (ref 13–44)
MCHC RBC-ENTMCNC: 31 PG — SIGNIFICANT CHANGE UP (ref 27–34)
MCHC RBC-ENTMCNC: 33 GM/DL — SIGNIFICANT CHANGE UP (ref 32–36)
MCV RBC AUTO: 93.8 FL — SIGNIFICANT CHANGE UP (ref 80–100)
MONOCYTES # BLD AUTO: 0.61 K/UL — SIGNIFICANT CHANGE UP (ref 0–0.9)
MONOCYTES NFR BLD AUTO: 9.6 % — SIGNIFICANT CHANGE UP (ref 2–14)
NEUTROPHILS # BLD AUTO: 4.33 K/UL — SIGNIFICANT CHANGE UP (ref 1.8–7.4)
NEUTROPHILS NFR BLD AUTO: 68.3 % — SIGNIFICANT CHANGE UP (ref 43–77)
PLATELET # BLD AUTO: 199 K/UL — SIGNIFICANT CHANGE UP (ref 150–400)
POTASSIUM SERPL-MCNC: 4.4 MMOL/L — SIGNIFICANT CHANGE UP (ref 3.5–5.3)
POTASSIUM SERPL-SCNC: 4.4 MMOL/L — SIGNIFICANT CHANGE UP (ref 3.5–5.3)
RBC # BLD: 4.71 M/UL — SIGNIFICANT CHANGE UP (ref 4.2–5.8)
RBC # FLD: 14.1 % — SIGNIFICANT CHANGE UP (ref 10.3–14.5)
SODIUM SERPL-SCNC: 140 MMOL/L — SIGNIFICANT CHANGE UP (ref 135–145)
WBC # BLD: 6.35 K/UL — SIGNIFICANT CHANGE UP (ref 3.8–10.5)
WBC # FLD AUTO: 6.35 K/UL — SIGNIFICANT CHANGE UP (ref 3.8–10.5)

## 2024-07-16 PROCEDURE — 93005 ELECTROCARDIOGRAM TRACING: CPT

## 2024-07-16 PROCEDURE — 99214 OFFICE O/P EST MOD 30 MIN: CPT | Mod: 25

## 2024-07-16 PROCEDURE — 93010 ELECTROCARDIOGRAM REPORT: CPT

## 2024-07-16 PROCEDURE — 80048 BASIC METABOLIC PNL TOTAL CA: CPT

## 2024-07-16 PROCEDURE — 85025 COMPLETE CBC W/AUTO DIFF WBC: CPT

## 2024-07-16 PROCEDURE — 36415 COLL VENOUS BLD VENIPUNCTURE: CPT

## 2024-07-16 NOTE — H&P PST ADULT - NSICDXPASTMEDICALHX_GEN_ALL_CORE_FT
PAST MEDICAL HISTORY:  Anxiety disorder     Back pain     Bladder neck stenosis, congenital     BPH (benign prostatic hyperplasia)     Bundle branch block RBBB    Cataract Early    Constipation     Cough variant asthma     Depression     Diverticulosis     Gastroesophageal reflux disease with hiatal hernia     H/O bursitis Both shoulders    H/O: GI bleed     Hiatal hernia     Hip bursitis, left     History of narrow angle glaucoma     HLD (hyperlipidemia)     Hypothyroid     IBS (irritable bowel syndrome) IBS-C    Lumbar disc herniation     Lumbar radiculopathy     Mitral stenosis     PVC (premature ventricular contraction)     Renal calculus, left     Renal cyst left  and right    Sustained SVT

## 2024-07-16 NOTE — H&P PST ADULT - HISTORY OF PRESENT ILLNESS
This is a 75 y/o male with a PMH of anxiety, HLD, BPH, RBBB, GERD who reports Left hip pain since December 2023. He denies any recent fall, he has tried physical thearpy and steroid injections in his Left hip, again with no success. He is now scheduled for a Left hip bursectomy and abductor repair. Denies any SOB, chest pain or palpations.

## 2024-07-16 NOTE — H&P PST ADULT - MUSCULOSKELETAL
unsteady gait, using a cane for ambulation/ROM intact/strength 5/5 bilateral upper extremities/strength 5/5 bilateral lower extremities details…

## 2024-07-16 NOTE — H&P PST ADULT - NSICDXPASTSURGICALHX_GEN_ALL_CORE_FT
PAST SURGICAL HISTORY:  Elective surgery lumbar PEREZ x 2    H/O endoscopy 08/2019    H/O left inguinal hernia repair     H/O right inguinal hernia repair 2008    H/O spinal fusion     History of cystoscopy TURP---8/28/2019    S/P colonoscopy 2021    S/P lumbar spinal fusion 2018    S/P TURP (transurethral resection of prostate)

## 2024-07-17 DIAGNOSIS — Z01.818 ENCOUNTER FOR OTHER PREPROCEDURAL EXAMINATION: ICD-10-CM

## 2024-07-21 ENCOUNTER — NON-APPOINTMENT (OUTPATIENT)
Age: 74
End: 2024-07-21

## 2024-07-22 ENCOUNTER — APPOINTMENT (OUTPATIENT)
Dept: OTOLARYNGOLOGY | Facility: CLINIC | Age: 74
End: 2024-07-22
Payer: MEDICARE

## 2024-07-22 VITALS — HEIGHT: 71 IN | WEIGHT: 170 LBS | BODY MASS INDEX: 23.8 KG/M2

## 2024-07-22 DIAGNOSIS — H92.01 OTALGIA, RIGHT EAR: ICD-10-CM

## 2024-07-22 DIAGNOSIS — H61.21 IMPACTED CERUMEN, RIGHT EAR: ICD-10-CM

## 2024-07-22 DIAGNOSIS — H93.8X1 OTHER SPECIFIED DISORDERS OF RIGHT EAR: ICD-10-CM

## 2024-07-22 PROCEDURE — 99213 OFFICE O/P EST LOW 20 MIN: CPT | Mod: 25

## 2024-07-22 PROCEDURE — 69210 REMOVE IMPACTED EAR WAX UNI: CPT

## 2024-07-22 NOTE — HISTORY OF PRESENT ILLNESS
[de-identified] : One week ago had some pain and clogged ear - right side.  Sched for repair of gluteus medus tear.  Did use neomycin /polymyxin drops on his own last week.  Now ear feels improved.  Sl pressure in ear.

## 2024-07-22 NOTE — PHYSICAL EXAM
[de-identified] : right impacted cerumen : Left clear  [de-identified] : after cerumen removal  [Midline] : trachea located in midline position [Normal] : no rashes

## 2024-07-22 NOTE — REVIEW OF SYSTEMS
[Negative] : Heme/Lymph [de-identified] : sharp pain last wk, felt like water in right ear,  [FreeTextEntry1] : took neomycin, but it was not prescribed to him.  Having surgery on Friday, gluteus tear surgery

## 2024-07-22 NOTE — ASSESSMENT
[FreeTextEntry1] : Patient with several day hx of right ear discomfort  and clogging. Here to r/o infection prior to planned surgical procedure.   Self treated with neomycin/polymyxin drops.  Has right cerumen impaction - ear clear after  - no  evidence of infection - recommended to not use ear drops.   Discussed audio - patient declined - followup as needed.

## 2024-07-25 ENCOUNTER — TRANSCRIPTION ENCOUNTER (OUTPATIENT)
Age: 74
End: 2024-07-25

## 2024-07-26 ENCOUNTER — TRANSCRIPTION ENCOUNTER (OUTPATIENT)
Age: 74
End: 2024-07-26

## 2024-07-26 ENCOUNTER — APPOINTMENT (OUTPATIENT)
Dept: ORTHOPEDIC SURGERY | Facility: HOSPITAL | Age: 74
End: 2024-07-26

## 2024-07-26 ENCOUNTER — RESULT REVIEW (OUTPATIENT)
Age: 74
End: 2024-07-26

## 2024-07-26 PROCEDURE — 27062 REMOVE FEMUR LESION/BURSA: CPT | Mod: LT

## 2024-07-26 PROCEDURE — 27299 UNLISTED PX PELVIS/HIP JOINT: CPT

## 2024-07-26 RX ORDER — PREGABALIN 50 MG/1
1 CAPSULE ORAL
Refills: 0 | DISCHARGE

## 2024-07-26 RX ORDER — ROSUVASTATIN CALCIUM 20 MG/1
1 TABLET ORAL
Refills: 0 | DISCHARGE

## 2024-07-28 PROBLEM — S76.019A RUPTURE OF HIP ABDUCTOR TENDON: Status: ACTIVE | Noted: 2024-07-28

## 2024-08-05 ENCOUNTER — APPOINTMENT (OUTPATIENT)
Dept: ORTHOPEDIC SURGERY | Facility: CLINIC | Age: 74
End: 2024-08-05

## 2024-08-05 PROCEDURE — 99024 POSTOP FOLLOW-UP VISIT: CPT

## 2024-08-05 NOTE — ASSESSMENT
[FreeTextEntry1] : The patient comes in today for follow-up on his left hip.  He is now 2 weeks out from his open trochanteric bursectomy of the left hip and abductor tendon repair.  He is currently on abductor precautions using the walker.  His wound is healed.  Steri-Strips were applied to the glued incision.  There is no signs of infection or DVT.  The plan at this time start him in a physical therapy program.  He will stop the walker when he is 1 month postop.  He will go to a cane.  I will see him back in the office in 6 weeks.

## 2024-08-05 NOTE — HISTORY OF PRESENT ILLNESS
[4] : 4 [2] : 2 [] : Post Surgical Visit: yes [FreeTextEntry5] : 74 y.o patient is here for PO#1 of the left hip today. DOS noted above.  States recovery is going well with some pain. States the 1st week post op he had trouble bending his leg. He is NWB with a cane and walker.  [de-identified] : 7/26/24 [de-identified] : LEFT HIP BURSECTOMY AND ABDUCTOR REPAIR

## 2024-08-27 ENCOUNTER — RESULT REVIEW (OUTPATIENT)
Age: 74
End: 2024-08-27

## 2024-08-27 ENCOUNTER — OUTPATIENT (OUTPATIENT)
Dept: OUTPATIENT SERVICES | Facility: HOSPITAL | Age: 74
LOS: 1 days | End: 2024-08-27
Payer: COMMERCIAL

## 2024-08-27 ENCOUNTER — APPOINTMENT (OUTPATIENT)
Dept: ULTRASOUND IMAGING | Facility: CLINIC | Age: 74
End: 2024-08-27
Payer: MEDICARE

## 2024-08-27 DIAGNOSIS — Z98.890 OTHER SPECIFIED POSTPROCEDURAL STATES: Chronic | ICD-10-CM

## 2024-08-27 DIAGNOSIS — Z98.1 ARTHRODESIS STATUS: Chronic | ICD-10-CM

## 2024-08-27 DIAGNOSIS — M16.12 UNILATERAL PRIMARY OSTEOARTHRITIS, LEFT HIP: ICD-10-CM

## 2024-08-27 DIAGNOSIS — Z90.79 ACQUIRED ABSENCE OF OTHER GENITAL ORGAN(S): Chronic | ICD-10-CM

## 2024-08-27 DIAGNOSIS — Z41.9 ENCOUNTER FOR PROCEDURE FOR PURPOSES OTHER THAN REMEDYING HEALTH STATE, UNSPECIFIED: Chronic | ICD-10-CM

## 2024-08-27 PROCEDURE — 93970 EXTREMITY STUDY: CPT | Mod: 26

## 2024-08-27 PROCEDURE — 93970 EXTREMITY STUDY: CPT

## 2024-08-29 ENCOUNTER — APPOINTMENT (OUTPATIENT)
Dept: OTOLARYNGOLOGY | Facility: CLINIC | Age: 74
End: 2024-08-29

## 2024-09-18 ENCOUNTER — APPOINTMENT (OUTPATIENT)
Dept: ORTHOPEDIC SURGERY | Facility: CLINIC | Age: 74
End: 2024-09-18
Payer: MEDICARE

## 2024-09-18 VITALS — BODY MASS INDEX: 23.8 KG/M2 | HEIGHT: 71 IN | WEIGHT: 170 LBS

## 2024-09-18 DIAGNOSIS — M70.62 TROCHANTERIC BURSITIS, LEFT HIP: ICD-10-CM

## 2024-09-18 DIAGNOSIS — S76.019A STRAIN OF MUSCLE, FASCIA AND TENDON OF UNSPECIFIED HIP, INITIAL ENCOUNTER: ICD-10-CM

## 2024-09-18 PROCEDURE — 99024 POSTOP FOLLOW-UP VISIT: CPT

## 2024-09-21 NOTE — ASSESSMENT
[FreeTextEntry1] : The patient is 7 weeks from his open trochanteric bursectomy of the left hip and abductor tendon repair.  He is currently on abductor precautions using the walker.  His wound is healed. The patient continues to have bilateral swelling issues s/p surgery. These are to both lower extremities and feet. He reports that this is significantly tender. He has discussed this with his cardiologist and PCP. He is set up to see a vascular specialist as well.   Examination of his left lower extremity reveals normal neurovascular exam.  He has mild pitting edema in both legs.  His left hip wound is healed with no signs of infection or DVT.  There is no redness or rashes.  The plan at this time is to have him use a cane.  He will follow-up with his vascular specialist to rule out DVT and his cardiologist to evaluate for bilateral lower extremity swelling.  He will start physical therapy for the left hip.  I will see him back in the office in 6 weeks.

## 2024-09-21 NOTE — HISTORY OF PRESENT ILLNESS
[Occasional] : occasional [] : Post Surgical Visit: yes [FreeTextEntry5] : 75 y/o M presents for PO #2 eval of the Lt. hip today. Pt reports of improvement in recovery since last visit. He has not started PT yet.  [de-identified] : 7/26/24 [de-identified] : LEFT HIP BURSECTOMY AND ABDUCTOR REPAIR

## 2024-09-21 NOTE — HISTORY OF PRESENT ILLNESS
[Occasional] : occasional [] : Post Surgical Visit: yes [FreeTextEntry5] : 73 y/o M presents for PO #2 eval of the Lt. hip today. Pt reports of improvement in recovery since last visit. He has not started PT yet.  [de-identified] : 7/26/24 [de-identified] : LEFT HIP BURSECTOMY AND ABDUCTOR REPAIR

## 2024-10-16 ENCOUNTER — APPOINTMENT (OUTPATIENT)
Dept: INTERNAL MEDICINE | Facility: CLINIC | Age: 74
End: 2024-10-16
Payer: MEDICARE

## 2024-10-16 VITALS
TEMPERATURE: 98 F | RESPIRATION RATE: 15 BRPM | BODY MASS INDEX: 25.06 KG/M2 | OXYGEN SATURATION: 97 % | DIASTOLIC BLOOD PRESSURE: 60 MMHG | WEIGHT: 179 LBS | SYSTOLIC BLOOD PRESSURE: 110 MMHG | HEART RATE: 71 BPM | HEIGHT: 71 IN

## 2024-10-16 DIAGNOSIS — J30.2 OTHER SEASONAL ALLERGIC RHINITIS: ICD-10-CM

## 2024-10-16 DIAGNOSIS — J45.991 COUGH VARIANT ASTHMA: ICD-10-CM

## 2024-10-16 DIAGNOSIS — R09.82 POSTNASAL DRIP: ICD-10-CM

## 2024-10-16 DIAGNOSIS — J98.4 OTHER DISORDERS OF LUNG: ICD-10-CM

## 2024-10-16 DIAGNOSIS — J45.909 UNSPECIFIED ASTHMA, UNCOMPLICATED: ICD-10-CM

## 2024-10-16 PROCEDURE — 99215 OFFICE O/P EST HI 40 MIN: CPT | Mod: 25

## 2024-10-16 PROCEDURE — 94727 GAS DIL/WSHOT DETER LNG VOL: CPT

## 2024-10-16 PROCEDURE — 94060 EVALUATION OF WHEEZING: CPT

## 2024-10-16 PROCEDURE — 94729 DIFFUSING CAPACITY: CPT

## 2024-10-16 PROCEDURE — ZZZZZ: CPT

## 2024-10-24 ENCOUNTER — APPOINTMENT (OUTPATIENT)
Dept: DERMATOLOGY | Facility: CLINIC | Age: 74
End: 2024-10-24
Payer: MEDICARE

## 2024-10-24 ENCOUNTER — NON-APPOINTMENT (OUTPATIENT)
Age: 74
End: 2024-10-24

## 2024-10-24 DIAGNOSIS — Z87.828 PERSONAL HISTORY OF OTHER (HEALED) PHYSICAL INJURY AND TRAUMA: ICD-10-CM

## 2024-10-24 DIAGNOSIS — D22.9 MELANOCYTIC NEVI, UNSPECIFIED: ICD-10-CM

## 2024-10-24 DIAGNOSIS — R21 RASH AND OTHER NONSPECIFIC SKIN ERUPTION: ICD-10-CM

## 2024-10-24 DIAGNOSIS — Z86.19 PERSONAL HISTORY OF OTHER INFECTIOUS AND PARASITIC DISEASES: ICD-10-CM

## 2024-10-24 DIAGNOSIS — L82.1 OTHER SEBORRHEIC KERATOSIS: ICD-10-CM

## 2024-10-24 DIAGNOSIS — Z86.018 PERSONAL HISTORY OF OTHER BENIGN NEOPLASM: ICD-10-CM

## 2024-10-24 DIAGNOSIS — L57.8 OTHER SKIN CHANGES DUE TO CHRONIC EXPOSURE TO NONIONIZING RADIATION: ICD-10-CM

## 2024-10-24 DIAGNOSIS — D18.01 HEMANGIOMA OF SKIN AND SUBCUTANEOUS TISSUE: ICD-10-CM

## 2024-10-24 PROBLEM — I87.2 STASIS DERMATITIS: Status: ACTIVE | Noted: 2024-10-24

## 2024-10-24 PROCEDURE — 99214 OFFICE O/P EST MOD 30 MIN: CPT

## 2024-10-24 RX ORDER — TRIAMCINOLONE ACETONIDE 1 MG/G
0.1 CREAM TOPICAL
Qty: 1 | Refills: 2 | Status: ACTIVE | COMMUNITY
Start: 2024-10-24 | End: 1900-01-01

## 2024-10-28 ENCOUNTER — APPOINTMENT (OUTPATIENT)
Dept: VASCULAR SURGERY | Facility: CLINIC | Age: 74
End: 2024-10-28
Payer: MEDICARE

## 2024-10-28 VITALS
BODY MASS INDEX: 24.5 KG/M2 | HEIGHT: 71 IN | WEIGHT: 175 LBS | DIASTOLIC BLOOD PRESSURE: 73 MMHG | SYSTOLIC BLOOD PRESSURE: 106 MMHG | OXYGEN SATURATION: 96 % | HEART RATE: 81 BPM

## 2024-10-28 DIAGNOSIS — I87.2 VENOUS INSUFFICIENCY (CHRONIC) (PERIPHERAL): ICD-10-CM

## 2024-10-28 DIAGNOSIS — I89.0 LYMPHEDEMA, NOT ELSEWHERE CLASSIFIED: ICD-10-CM

## 2024-10-28 PROCEDURE — 99203 OFFICE O/P NEW LOW 30 MIN: CPT

## 2024-10-28 PROCEDURE — 93970 EXTREMITY STUDY: CPT

## 2024-10-29 PROBLEM — I89.0 LYMPHEDEMA, LIMB: Status: ACTIVE | Noted: 2024-10-29

## 2024-11-10 ENCOUNTER — INPATIENT (INPATIENT)
Facility: HOSPITAL | Age: 74
LOS: 1 days | Discharge: HOME | DRG: 93 | End: 2024-11-12
Attending: STUDENT IN AN ORGANIZED HEALTH CARE EDUCATION/TRAINING PROGRAM | Admitting: STUDENT IN AN ORGANIZED HEALTH CARE EDUCATION/TRAINING PROGRAM
Payer: MEDICARE

## 2024-11-10 VITALS
RESPIRATION RATE: 18 BRPM | DIASTOLIC BLOOD PRESSURE: 86 MMHG | WEIGHT: 175.05 LBS | SYSTOLIC BLOOD PRESSURE: 145 MMHG | OXYGEN SATURATION: 99 % | TEMPERATURE: 98 F | HEIGHT: 71 IN | HEART RATE: 91 BPM

## 2024-11-10 DIAGNOSIS — Z98.1 ARTHRODESIS STATUS: Chronic | ICD-10-CM

## 2024-11-10 DIAGNOSIS — Z90.79 ACQUIRED ABSENCE OF OTHER GENITAL ORGAN(S): Chronic | ICD-10-CM

## 2024-11-10 DIAGNOSIS — Z98.890 OTHER SPECIFIED POSTPROCEDURAL STATES: Chronic | ICD-10-CM

## 2024-11-10 DIAGNOSIS — Z41.9 ENCOUNTER FOR PROCEDURE FOR PURPOSES OTHER THAN REMEDYING HEALTH STATE, UNSPECIFIED: Chronic | ICD-10-CM

## 2024-11-10 LAB
ANION GAP SERPL CALC-SCNC: 4 MMOL/L — LOW (ref 5–17)
BASOPHILS # BLD AUTO: 0.08 K/UL — SIGNIFICANT CHANGE UP (ref 0–0.2)
BASOPHILS NFR BLD AUTO: 1.2 % — SIGNIFICANT CHANGE UP (ref 0–2)
BUN SERPL-MCNC: 12 MG/DL — SIGNIFICANT CHANGE UP (ref 7–23)
CALCIUM SERPL-MCNC: 9.6 MG/DL — SIGNIFICANT CHANGE UP (ref 8.5–10.1)
CHLORIDE SERPL-SCNC: 109 MMOL/L — HIGH (ref 96–108)
CO2 SERPL-SCNC: 27 MMOL/L — SIGNIFICANT CHANGE UP (ref 22–31)
CREAT SERPL-MCNC: 0.95 MG/DL — SIGNIFICANT CHANGE UP (ref 0.5–1.3)
EGFR: 85 ML/MIN/1.73M2 — SIGNIFICANT CHANGE UP
EOSINOPHIL # BLD AUTO: 0.18 K/UL — SIGNIFICANT CHANGE UP (ref 0–0.5)
EOSINOPHIL NFR BLD AUTO: 2.6 % — SIGNIFICANT CHANGE UP (ref 0–6)
GLUCOSE SERPL-MCNC: 90 MG/DL — SIGNIFICANT CHANGE UP (ref 70–99)
HCT VFR BLD CALC: 41.8 % — SIGNIFICANT CHANGE UP (ref 39–50)
HGB BLD-MCNC: 13.9 G/DL — SIGNIFICANT CHANGE UP (ref 13–17)
IMM GRANULOCYTES NFR BLD AUTO: 0.3 % — SIGNIFICANT CHANGE UP (ref 0–0.9)
LYMPHOCYTES # BLD AUTO: 1.11 K/UL — SIGNIFICANT CHANGE UP (ref 1–3.3)
LYMPHOCYTES # BLD AUTO: 16 % — SIGNIFICANT CHANGE UP (ref 13–44)
MCHC RBC-ENTMCNC: 31.3 PG — SIGNIFICANT CHANGE UP (ref 27–34)
MCHC RBC-ENTMCNC: 33.3 G/DL — SIGNIFICANT CHANGE UP (ref 32–36)
MCV RBC AUTO: 94.1 FL — SIGNIFICANT CHANGE UP (ref 80–100)
MONOCYTES # BLD AUTO: 0.66 K/UL — SIGNIFICANT CHANGE UP (ref 0–0.9)
MONOCYTES NFR BLD AUTO: 9.5 % — SIGNIFICANT CHANGE UP (ref 2–14)
NEUTROPHILS # BLD AUTO: 4.87 K/UL — SIGNIFICANT CHANGE UP (ref 1.8–7.4)
NEUTROPHILS NFR BLD AUTO: 70.4 % — SIGNIFICANT CHANGE UP (ref 43–77)
PLATELET # BLD AUTO: 160 K/UL — SIGNIFICANT CHANGE UP (ref 150–400)
POTASSIUM SERPL-MCNC: 4.1 MMOL/L — SIGNIFICANT CHANGE UP (ref 3.5–5.3)
POTASSIUM SERPL-SCNC: 4.1 MMOL/L — SIGNIFICANT CHANGE UP (ref 3.5–5.3)
RBC # BLD: 4.44 M/UL — SIGNIFICANT CHANGE UP (ref 4.2–5.8)
RBC # FLD: 13.5 % — SIGNIFICANT CHANGE UP (ref 10.3–14.5)
SODIUM SERPL-SCNC: 140 MMOL/L — SIGNIFICANT CHANGE UP (ref 135–145)
WBC # BLD: 6.92 K/UL — SIGNIFICANT CHANGE UP (ref 3.8–10.5)
WBC # FLD AUTO: 6.92 K/UL — SIGNIFICANT CHANGE UP (ref 3.8–10.5)

## 2024-11-10 PROCEDURE — 99285 EMERGENCY DEPT VISIT HI MDM: CPT

## 2024-11-10 PROCEDURE — 70450 CT HEAD/BRAIN W/O DYE: CPT | Mod: 26,MC

## 2024-11-10 PROCEDURE — 93010 ELECTROCARDIOGRAM REPORT: CPT

## 2024-11-10 NOTE — ED ADULT NURSE NOTE - NSFALLHARMRISKINTERV_ED_ALL_ED

## 2024-11-10 NOTE — ED ADULT TRIAGE NOTE - CHIEF COMPLAINT QUOTE
Pt BIBEMS presenting to ED from home  c/o "being off balance but today it got a little bit worse" and confusion since this morning. Per EMS, wife reports that he has been off balance, and more confused. Pt A&OX4 in triage and denies head ache, chest pain, SOB, and dizziness. BEFAST negative. MD Marques aware and no code stroke called. BGM 89 in triage. +Allergies.

## 2024-11-10 NOTE — ED ADULT NURSE NOTE - OBJECTIVE STATEMENT
Has been having trouble with gait and balance for several weeks, worse today than normal. BEFAST negative. A&O x4.

## 2024-11-11 DIAGNOSIS — E03.9 HYPOTHYROIDISM, UNSPECIFIED: ICD-10-CM

## 2024-11-11 DIAGNOSIS — R42 DIZZINESS AND GIDDINESS: ICD-10-CM

## 2024-11-11 DIAGNOSIS — F32.9 MAJOR DEPRESSIVE DISORDER, SINGLE EPISODE, UNSPECIFIED: ICD-10-CM

## 2024-11-11 DIAGNOSIS — R26.81 UNSTEADINESS ON FEET: ICD-10-CM

## 2024-11-11 LAB
A1C WITH ESTIMATED AVERAGE GLUCOSE RESULT: 5.7 % — HIGH (ref 4–5.6)
ESTIMATED AVERAGE GLUCOSE: 117 MG/DL — HIGH (ref 68–114)

## 2024-11-11 PROCEDURE — 92610 EVALUATE SWALLOWING FUNCTION: CPT | Mod: GN

## 2024-11-11 PROCEDURE — 97162 PT EVAL MOD COMPLEX 30 MIN: CPT | Mod: GP

## 2024-11-11 PROCEDURE — 70551 MRI BRAIN STEM W/O DYE: CPT | Mod: 26

## 2024-11-11 PROCEDURE — 70551 MRI BRAIN STEM W/O DYE: CPT | Mod: MC

## 2024-11-11 PROCEDURE — 92523 SPEECH SOUND LANG COMPREHEN: CPT | Mod: GN

## 2024-11-11 PROCEDURE — 80048 BASIC METABOLIC PNL TOTAL CA: CPT

## 2024-11-11 PROCEDURE — 36415 COLL VENOUS BLD VENIPUNCTURE: CPT

## 2024-11-11 PROCEDURE — 80061 LIPID PANEL: CPT

## 2024-11-11 PROCEDURE — 99223 1ST HOSP IP/OBS HIGH 75: CPT

## 2024-11-11 PROCEDURE — 97116 GAIT TRAINING THERAPY: CPT | Mod: GP

## 2024-11-11 RX ORDER — POLYETHYLENE GLYCOL 3350 17 G/17G
17 POWDER, FOR SOLUTION ORAL DAILY
Refills: 0 | Status: DISCONTINUED | OUTPATIENT
Start: 2024-11-11 | End: 2024-11-12

## 2024-11-11 RX ORDER — BUPROPION HCL 150 MG
300 TABLET,SUSTAINED-RELEASE 12 HR ORAL DAILY
Refills: 0 | Status: DISCONTINUED | OUTPATIENT
Start: 2024-11-11 | End: 2024-11-12

## 2024-11-11 RX ORDER — ONDANSETRON HYDROCHLORIDE 2 MG/ML
4 INJECTION, SOLUTION INTRAMUSCULAR; INTRAVENOUS ONCE
Refills: 0 | Status: COMPLETED | OUTPATIENT
Start: 2024-11-11 | End: 2024-11-11

## 2024-11-11 RX ORDER — CLONAZEPAM 1 MG
0.5 TABLET ORAL AT BEDTIME
Refills: 0 | Status: DISCONTINUED | OUTPATIENT
Start: 2024-11-11 | End: 2024-11-11

## 2024-11-11 RX ORDER — FAMOTIDINE 10 MG/ML
20 INJECTION INTRAVENOUS ONCE
Refills: 0 | Status: COMPLETED | OUTPATIENT
Start: 2024-11-11 | End: 2024-11-11

## 2024-11-11 RX ORDER — FAMOTIDINE 10 MG/ML
40 INJECTION INTRAVENOUS
Refills: 0 | Status: DISCONTINUED | OUTPATIENT
Start: 2024-11-11 | End: 2024-11-11

## 2024-11-11 RX ORDER — ROSUVASTATIN CALCIUM 10 MG
10 TABLET ORAL AT BEDTIME
Refills: 0 | Status: DISCONTINUED | OUTPATIENT
Start: 2024-11-11 | End: 2024-11-12

## 2024-11-11 RX ORDER — ASPIRIN/MAG CARB/ALUMINUM AMIN 325 MG
325 TABLET ORAL ONCE
Refills: 0 | Status: COMPLETED | OUTPATIENT
Start: 2024-11-11 | End: 2024-11-11

## 2024-11-11 RX ORDER — CLONAZEPAM 1 MG
0.5 TABLET ORAL
Refills: 0 | Status: DISCONTINUED | OUTPATIENT
Start: 2024-11-11 | End: 2024-11-12

## 2024-11-11 RX ORDER — HEPARIN SODIUM 10000 [USP'U]/ML
5000 INJECTION INTRAVENOUS; SUBCUTANEOUS EVERY 12 HOURS
Refills: 0 | Status: DISCONTINUED | OUTPATIENT
Start: 2024-11-11 | End: 2024-11-12

## 2024-11-11 RX ORDER — ASPIRIN/MAG CARB/ALUMINUM AMIN 325 MG
81 TABLET ORAL DAILY
Refills: 0 | Status: DISCONTINUED | OUTPATIENT
Start: 2024-11-11 | End: 2024-11-12

## 2024-11-11 RX ORDER — ACETAMINOPHEN 500 MG
650 TABLET ORAL ONCE
Refills: 0 | Status: COMPLETED | OUTPATIENT
Start: 2024-11-11 | End: 2024-11-11

## 2024-11-11 RX ORDER — PANTOPRAZOLE SODIUM 40 MG/1
40 TABLET, DELAYED RELEASE ORAL
Refills: 0 | Status: DISCONTINUED | OUTPATIENT
Start: 2024-11-11 | End: 2024-11-12

## 2024-11-11 RX ORDER — OXYCODONE HYDROCHLORIDE 30 MG/1
5 TABLET ORAL EVERY 6 HOURS
Refills: 0 | Status: DISCONTINUED | OUTPATIENT
Start: 2024-11-11 | End: 2024-11-12

## 2024-11-11 RX ORDER — FAMOTIDINE 10 MG/ML
40 INJECTION INTRAVENOUS DAILY
Refills: 0 | Status: DISCONTINUED | OUTPATIENT
Start: 2024-11-11 | End: 2024-11-12

## 2024-11-11 RX ORDER — PANTOPRAZOLE SODIUM 40 MG/1
40 TABLET, DELAYED RELEASE ORAL ONCE
Refills: 0 | Status: COMPLETED | OUTPATIENT
Start: 2024-11-11 | End: 2024-11-11

## 2024-11-11 RX ORDER — LEVOTHYROXINE SODIUM 88 MCG
50 TABLET ORAL DAILY
Refills: 0 | Status: DISCONTINUED | OUTPATIENT
Start: 2024-11-11 | End: 2024-11-12

## 2024-11-11 RX ORDER — TAMSULOSIN HCL 0.4 MG
0.4 CAPSULE ORAL AT BEDTIME
Refills: 0 | Status: DISCONTINUED | OUTPATIENT
Start: 2024-11-11 | End: 2024-11-12

## 2024-11-11 RX ADMIN — Medication 300 MILLIGRAM(S): at 10:15

## 2024-11-11 RX ADMIN — FAMOTIDINE 20 MILLIGRAM(S): 10 INJECTION INTRAVENOUS at 06:51

## 2024-11-11 RX ADMIN — Medication 650 MILLIGRAM(S): at 06:23

## 2024-11-11 RX ADMIN — Medication 0.4 MILLIGRAM(S): at 21:38

## 2024-11-11 RX ADMIN — ONDANSETRON HYDROCHLORIDE 4 MILLIGRAM(S): 2 INJECTION, SOLUTION INTRAMUSCULAR; INTRAVENOUS at 06:51

## 2024-11-11 RX ADMIN — HEPARIN SODIUM 5000 UNIT(S): 10000 INJECTION INTRAVENOUS; SUBCUTANEOUS at 21:38

## 2024-11-11 RX ADMIN — PANTOPRAZOLE SODIUM 40 MILLIGRAM(S): 40 TABLET, DELAYED RELEASE ORAL at 05:46

## 2024-11-11 RX ADMIN — Medication 0.5 MILLIGRAM(S): at 21:35

## 2024-11-11 RX ADMIN — Medication 325 MILLIGRAM(S): at 00:48

## 2024-11-11 RX ADMIN — Medication 650 MILLIGRAM(S): at 07:30

## 2024-11-11 NOTE — CONSULT NOTE ADULT - NS ATTEND AMEND GEN_ALL_CORE FT
Patient discussed with DAYSI Fuller, and examined at the bedside.     Patient reporting about 2 hours of vertiginous symptoms upon waking up on AM of 11/10.  He reports the prior evening, he took about double the dose of his usual clonazepam for sleep.  The vertigo resolved after about 2 hours, and he has not experienced it again since.  he has not had any prior episodes of vertigo.  he denied any other symptoms at the time of the vertigo, other than mild blurred vision.      On exam:   GEN: NAD  cV; rRR, S1, s2  PULM: CTDAB  NEURO:   Awake, alert, oriented, giving a clear history, speaking fluently.  Reasoning and insight are intact.   Pupils 3-2mm, symmetric, full VF's, normal EOM, no  nystagmus, No vertigo on Carlos hallpike maneuver, and no vertigo.  No saccadic intrusions on head impulse.  no skew deviation.   MOTOR: normal bulk an dtone, no drift, 5/5 throughout to confrontataion  COORDINATION: normal FNF, no dysdiadochokinesia.      CTH images reviewed; no hemorrhage.  No large territorial infarct.     AP: 74 man with hypothyroid, SVT, asthma, glaucoma, experiencing severe persistent vertigo, lasting about 2 hours, on AM of 11/10, self resolved, without recurrence.  On exam, no neurological deficits.  No cerebellar signs, and no findings on provocative maneuvers.  No acute findings on the CTH.    Acute vestibular syndrome.  Peripheral versus central.  No clear evidence for either at this point based on exam.    -follow up MRI brain  -neurology to follow. Please page or call with any acute neuro changes or other issues we can help address.

## 2024-11-11 NOTE — PATIENT PROFILE ADULT - FALL HARM RISK - HARM RISK INTERVENTIONS

## 2024-11-11 NOTE — PHYSICAL THERAPY INITIAL EVALUATION ADULT - MARITAL STATUS
pt has longtime life -partner named Bivona/Domestic partner pt has longtime life -partner named Billie Saldivar who has her own health/mobility issues/Domestic partner

## 2024-11-11 NOTE — ED ADULT NURSE REASSESSMENT NOTE - NS ED NURSE REASSESS COMMENT FT1
Received report from BABITA Hines. pt awake and alert, AO x 4. Respirations even and unlabored. pt wheeled to bathroom and provided food. No further complaints. Side rails up. Within view of nursing station. Safety and comfort maintained.

## 2024-11-11 NOTE — SWALLOW BEDSIDE ASSESSMENT ADULT - ASR SWALLOW LINGUAL MOBILITY
No tongue focality evident on exam. Pt also w/white plaque on tongue dorsum on left, possibly reflective of Thrush./within functional limits

## 2024-11-11 NOTE — ED ADULT NURSE REASSESSMENT NOTE - NS ED NURSE REASSESS COMMENT FT1
pt c/o abdominal pain and headache. MD Hammonds made aware. PERRLA. Respirations unlabored. pt moved to room within view o0f nurses station and educated on fall risk precautions. Used urinal. Call bell in reach. pt c/o abdominal pain and headache. MD Hammonds made aware. PERRLA. Respirations unlabored. pt moved to room within view of nurses station and educated on fall risk precautions. Used urinal. Call bell in reach.

## 2024-11-11 NOTE — PHYSICAL THERAPY INITIAL EVALUATION ADULT - GAIT PATTERN USED, PT EVAL
?slight lean toward R side laterally detected without loss of balance ,appears PALE,but denies feeling lightheaded/faint etc, no ataxia or gross imbalance/2-point gait

## 2024-11-11 NOTE — CONSULT NOTE ADULT - ASSESSMENT
73yo M with PMhx as above presents due to confusion and dizziness and hand shaking.    -?vertigo but worried that he is having trouble doing tasks that used to be easy for him and he is having a tremor now. Still could be related to vertigo but agree with checking MRI of the brain to r/o CVA  -Neurology consulted  -Consider meclizine or valium to help with vertigo symptoms   -Plan to montior on tele for now  -C/W hydration and encouraging PO intake

## 2024-11-11 NOTE — PHARMACOTHERAPY INTERVENTION NOTE - COMMENTS
Medication history complete, reviewed medication with patient and confirmed with DrFirst. Personalized Preventive Plan for Cris Márquez - 4/9/2019  Medicare offers a range of preventive health benefits. Some of the tests and screenings are paid in full while other may be subject to a deductible, co-insurance, and/or copay. Some of these benefits include a comprehensive review of your medical history including lifestyle, illnesses that may run in your family, and various assessments and screenings as appropriate. After reviewing your medical record and screening and assessments performed today your provider may have ordered immunizations, labs, imaging, and/or referrals for you. A list of these orders (if applicable) as well as your Preventive Care list are included within your After Visit Summary for your review. Other Preventive Recommendations:    · A preventive eye exam performed by an eye specialist is recommended every 1-2 years to screen for glaucoma; cataracts, macular degeneration, and other eye disorders. · A preventive dental visit is recommended every 6 months. · Try to get at least 150 minutes of exercise per week or 10,000 steps per day on a pedometer . · Order or download the FREE \"Exercise & Physical Activity: Your Everyday Guide\" from [employer]. Call 2-973.379.7991 or search [employer] online. · You need 2266-7928 mg of calcium and 8664-0638 IU of vitamin D per day. It is possible to meet your calcium requirement with diet alone, but a vitamin D supplement is usually necessary to meet this goal.  · When exposed to the sun, use a sunscreen that protects against both UVA and UVB radiation with an SPF of 30 or greater. Reapply every 2 to 3 hours or after sweating, drying off with a towel, or swimming. · Always wear a seat belt when traveling in a car. Always wear a helmet when riding a bicycle or motorcycle.  used

## 2024-11-11 NOTE — H&P ADULT - NSHPREVIEWOFSYSTEMS_GEN_ALL_CORE
REVIEW OF SYSTEMS:  General: NAD, hemodynamically stable, (-)  fever, (-) chills, (-) weakness  HEENT:  Eyes:  No visual loss, blurred vision, double vision or yellow sclerae. Ears, Nose, Throat:  No hearing loss, sneezing, congestion, runny nose or sore throat.  SKIN:  No rash or itching.  CARDIOVASCULAR:  No chest pain, chest pressure or chest discomfort. No palpitations or edema.  RESPIRATORY:  No shortness of breath, cough or sputum.  GASTROINTESTINAL:  No anorexia, nausea, vomiting or diarrhea. No abdominal pain or blood.  NEUROLOGICAL:  No headache, dizziness, syncope, paralysis, ataxia, numbness or tingling in the extremities. No change in bowel or bladder control.  MUSCULOSKELETAL:  No muscle, back pain, joint pain or stiffness.  HEMATOLOGIC:  No anemia, bleeding or bruising.  LYMPHATICS:  No enlarged nodes. No history of splenectomy.  ENDOCRINOLOGIC:  No reports of sweating, cold or heat intolerance. No polyuria or polydipsia.  ALLERGIES:  No history of asthma, hives, eczema or rhinitis.  Neuro: Feeling of spinning sensation

## 2024-11-11 NOTE — SWALLOW BEDSIDE ASSESSMENT ADULT - SWALLOW EVAL: PROGNOSIS
2) The pt was alert and interactive. He seemed anxious at times. Pt stated that he has a longstanding history of memory deficits with periodic word finding difficulties. Pt has a notebook at bedside and stated that he likes to write things down. His receptive language abilities appeared to be preserved. Additionally, the pt was able to verbalize during communicative probes. At these times, pt's motor speech integrity was felt to be preserved, his speech output was 100% intelligible and his verbalizations were linguistically intact/contextually appropriate/adequately detailed. Pt was able to verbalize his needs and feels that he is at speech-language baseline.

## 2024-11-11 NOTE — PHYSICAL THERAPY INITIAL EVALUATION ADULT - ACTIVE RANGE OF MOTION EXAMINATION, REHAB EVAL
velma. upper extremity Active ROM was WNL (within normal limits)/bilateral lower extremity Active ROM was WNL (within normal limits)

## 2024-11-11 NOTE — CONSULT NOTE ADULT - SUBJECTIVE AND OBJECTIVE BOX
CHIEF COMPLAINT:  Patient is a 74y old  Male who presents with a chief complaint of dizziness and confusion    HPI:  Patient is a 75 y/o male with past medical history of first degree AV block, PVCs, mitral valve stenosis, IBS, GERD, history of duadenal bleeding, hypothyroidism, HLD, cough variant asthma, incipient narrow angle glaucoma who presented c/o confusion and difficulty focusing with some dizziness. 11/10/24 he work up and felt like he could not focus with things moving. He felt very dizzy and unsteady and was struggling with doing tasks, like taking his phone off "do not disturb". His hands are very shaky and he feels uneasy in his stomach. He is able to swallow pills and liquids but when he tries to have some food he just feels nauseous and doesn't want to swallow/eat. He also has been having intermittent shooting pains into his hands/fingers B/L.    Symptoms continued so he came to the ER. He denies any recent illness or falls/trauma to the head.     PMHx:  PAST MEDICAL & SURGICAL HISTORY:  Anxiety disorder  Hypothyroid  Back pain  Lumbar disc herniation  Lumbar radiculopathy  Hiatal hernia  Constipation  Depression  BPH (benign prostatic hyperplasia)  Renal cyst left  and right  Bundle branch block RBBB  Mitral stenosis  Cataract Early  History of narrow angle glaucoma  Cough variant asthma  IBS (irritable bowel syndrome) IBS-C  Diverticulosis  Bladder neck stenosis, congenital  H/O bursitis Both shoulders  Gastroesophageal reflux disease with hiatal hernia  H/O: GI bleed  Sustained SVT s/p ablation 10/2023  PVC (premature ventricular contraction)  HLD (hyperlipidemia)  Hip bursitis, left  Renal calculus, left  Elective surgery lumbar PEREZ x 2  H/O right inguinal hernia repair   S/P lumbar spinal fusion 2018  History of cystoscopy TURP---2019  H/O left inguinal hernia repair    FAMILY HISTORY:   FAMILY HISTORY:  Family history of diabetes mellitus (Mother)    ALLERGIES:  Allergies  propofol (Other)  ampicillin (Unknown)  oxybutynin (Other)  antichlolinergics (Unknown)  cortisone (Other)  clindamycin (Other)  Lexapro (Other)  penicillin (Rash)    Intolerances  Percocet (Other)  Dilaudid (Other)    REVIEW OF SYSTEMS:  10 system ROS was obtained, all pertinent positives and negatives are in HPI otherwise they were negative.    Vital Signs Last 24 Hrs  T(C): 36.7 (2024 09:30), Max: 37.2 (2024 07:15)  T(F): 98.1 (:30), Max: 98.9 (2024 07:15)  HR: 83 () (78 - 91)  BP: 131/89 (:30) (128/78 - 145/86)  BP(mean): 98 () (93 - 98)  RR: 18 (30) (18 - 18)  SpO2: 99% () (98% - 99%)    Parameters below as of   Patient On (Oxygen Delivery Method): room air    CAPILLARY BLOOD GLUCOSE  POCT Blood Glucose.: 89 mg/dL (10 Nov 2024 20:37)      PHYSICAL EXAM:   Constitutional: awake and alert in NAD  HEENT: EOMI, Normal Hearing, MMM  Neck: Soft and supple, No LAD, No JVD, no carotid bruit  Respiratory: Breath sounds are clear bilaterally, No wheezing, rales or rhonchi, good air movement  Cardiovascular: RRR, soft systolic murumr at LSB and apex  Gastrointestinal: Bowel Sounds present, soft, nontender, nondistended, no guarding, no rebound  Extremities: Warm and well perfused, no peripheral edema  Neurological: A/O x 3, no focal deficits appreciated but having a lot of hand shaking  Skin: No rashes appreciated    MEDICATIONS:  MEDICATIONS  (STANDING):  aspirin enteric coated 81 milliGRAM(s) Oral daily  buPROPion XL (24-Hour) . 300 milliGRAM(s) Oral daily  clonazePAM  Tablet 0.5 milliGRAM(s) Oral at bedtime  famotidine    Tablet 40 milliGRAM(s) Oral two times a day  heparin   Injectable 5000 Unit(s) SubCutaneous every 12 hours  levothyroxine 50 MICROGram(s) Oral daily  pantoprazole    Tablet 40 milliGRAM(s) Oral before breakfast  rosuvastatin 10 milliGRAM(s) Oral at bedtime  tamsulosin 0.4 milliGRAM(s) Oral at bedtime      LABS: All Labs Reviewed:                        13.9   6.92  )-----------( 160      ( 10 Nov 2024 21:40 )             41.8     11-10    140  |  109[H]  |  12  ----------------------------<  90  4.1   |  27  |  0.95    Ca    9.6      10 Nov 2024 21:40     RADIOLOGY:    Reviewed in EMR    EK/10/24, my interpretation: NSR LAD IRBBB    TELEMETRY:    Reviewed and no significant events appreciated - remains in sinus

## 2024-11-11 NOTE — PHYSICAL THERAPY INITIAL EVALUATION ADULT - STANDING BALANCE: DYNAMIC, REHAB EVAL
RW/Cgx1,?slight lateral lean to R side laterally detected on initiating gait ,c/o being out of practice and feeling TIRED /lack of sleep/good minus

## 2024-11-11 NOTE — PHYSICAL THERAPY INITIAL EVALUATION ADULT - PATIENT/FAMILY/SIGNIFICANT OTHER GOALS STATEMENT, PT EVAL
pt reports poor appetite, has not eaten anything today ,offered applesauce which he has unopened at the bedside ; pt also c/o feeling very tired,didn't sleep last night as in ED

## 2024-11-11 NOTE — SWALLOW BEDSIDE ASSESSMENT ADULT - NS SPL SWALLOW CLINIC TRIAL FT
The pt exhibited Oropharyngeal Swallowing integrity which subjectively appeared to be within functional parameters for age. Bolus formation/transfer were mechanically functional for age w/o focality, swallow was triggered in an acceptable time frame for age and laryngeal lift on palpation during swallowing trials was felt to be functional for age as well. No behavioral aspiration signs exhibited. Odynophagia denied. Pt's oropharyngeal swallowing integrity appears to be stable for age.

## 2024-11-11 NOTE — SWALLOW BEDSIDE ASSESSMENT ADULT - SLP GENERAL OBSERVATIONS
The pt was alert and interactive. He seemed anxious at times. Pt stated that he has a longstanding history of memory deficits with periodic word finding difficulties. Pt has a notebook at bedside and stated that he likes to write things down. His receptive language abilities appeared to be preserved. Additionally, the pt was able to verbalize during communicative probes. At these times, pt's motor speech integrity was felt to be preserved, his speech output was 100% intelligible and his verbalizations were linguistically intact/contextually appropriate/adequately detailed. Pt was able to verbalize his needs and feels that he is at speech-language baseline.

## 2024-11-11 NOTE — ED PROVIDER NOTE - CROS ED CONS ALL NEG
CASE MANAGEMENT POST-PARTUM TRANSITIONAL CARE PLAN    39 weeks gestation of pregnancy [Z3A.39]    OB Provider: Dr. Barlow    Writer met w/ Tylor and FOB/BF Dougie Branham ([phone]) at her bedside to discuss DCP. She is S/P  on 24 @ 39w5d of Female infant    Writer verified address/phone number correct on facesheet. She states she lives with her BF/FOB Dougie and their 3 yr old son. She denied barriers with transportation home, to doctors appointments or with paying for medications upon discharge home.     [facility] [place] Medicaid insurance correct. Writer notified them they have 30 days from date of birth to add  to insurance policy by contacting [employer]. They verbalized understanding.    Infant name on BC: Johnna.   Infant PCP BEBA Marino @ [facility].     DME: None  HOME CARE: None    Anticipate DC home of couplet in private vehicle in 1-2 days status post vaginal delivery.    Readmission Risk              Risk of Unplanned Readmission:  7                   negative...

## 2024-11-11 NOTE — ED ADULT NURSE REASSESSMENT NOTE - NS ED NURSE REASSESS COMMENT FT1
Attempted ambulation trial, patient unable to ambulate safely with cane. Unsteady and unable to support self with cane. MD Marques made aware. Patient to be admitted.

## 2024-11-11 NOTE — H&P ADULT - NSHPPHYSICALEXAM_GEN_ALL_CORE
Physical Exam:   GENERAL APPEARANCE:  NAD, hemodynamically stable  T(C): 37.2 (11-11-24 @ 07:15), Max: 37.2 (11-11-24 @ 07:15)  HR: 78 (11-11-24 @ 07:15) (78 - 91)  BP: 128/78 (11-11-24 @ 07:15) (128/78 - 145/86)  RR: 18 (11-11-24 @ 07:15) (18 - 18)  SpO2: 98% (11-11-24 @ 07:15) (98% - 99%)  Wt(kg): --  HEENT:  Head is normocephalic    Skin:  Warm and dry without any rash   NECK:  Supple without lymphadenopathy.   HEART:  Regular rate and rhythm. normal S1 and S2, No M/R/G  LUNGS:  Good ins/exp effort, no W/R/R/C  ABDOMEN:  Soft, nontender, nondistended with good bowel sounds heard  EXTREMITIES:  Without cyanosis, clubbing or edema.   NEUROLOGICAL:  Gross nonfocal

## 2024-11-11 NOTE — ED ADULT NURSE REASSESSMENT NOTE - NS ED NURSE REASSESS COMMENT FT1
MD Cronin made aware pt feels like is going to vomit and continues to have abdominal pain. pt takes pepcid at home. pending order.

## 2024-11-11 NOTE — SWALLOW BEDSIDE ASSESSMENT ADULT - SWALLOW EVAL: CRITERIA FOR SKILLED INTERVENTION MET
DO NOT FEEL THAT ACUTE SPEECH PATHOLOGY FOLLOW UP WOULD CHANGE CLINICAL MANAGEMENT/OUTCOME IN HOSPITAL SETTING. PT'S SPEECH-LANGUAGE AND OROPHARYNGEAL SWALLOWING INTEGRITY ARE FUNCTIONAL/AT USUAL STATE/MAXIMIZED. GIVEN ABOVE, THIS SERVICE WILL NOT ACTIVELY FOLLOW. RECONSULT PRN SHOULD STATUS CHANGE AND CONDITION WARRANT.

## 2024-11-11 NOTE — ED PROVIDER NOTE - CLINICAL SUMMARY MEDICAL DECISION MAKING FREE TEXT BOX
Patient unable to ambulate severely dizzy ataxic cannot take more than 1 step with a walker will admit to hospital for physical therapy evaluation posterior CVA workup patient agrees to current plan of care patient unable to ambulate safely has high degree for clinical decompensation if not admitted to the hospital

## 2024-11-11 NOTE — PHYSICAL THERAPY INITIAL EVALUATION ADULT - PERTINENT HX OF CURRENT PROBLEM, REHAB EVAL
73 y/o male with past medical history of first degree AV block, PVCs, mitral valve stenosis, IBS, GERD, history of duodenal bleeding, hypothyroidism, HLD, cough variant asthma, incipient narrow angle glaucoma who presented c/o confusion and difficulty focusing with some dizziness. 11/10/24 he work up and felt like he could not focus with things moving. He felt very dizzy and unsteady and was struggling with doing tasks, like taking his phone off "do not disturb". His hands are very shaky and he feels uneasy in his stomach. He is able to swallow pills and liquids but when he tries to have some food he just feels nauseous and doesn't want to swallow/eat. He also has been having intermittent shooting pains into his hands/fingers B/L.    Symptoms continued so he came to the ER. He denies any recent illness or falls/trauma to the head.

## 2024-11-11 NOTE — H&P ADULT - HISTORY OF PRESENT ILLNESS
Patient is a 75 y/o male with past medical history of first degree AV block, PVCs, mitral valve stenosis, IBS, GERD, history of duadenal bleeding, hypothyroidism, cough variant asthma, incipient narrow angle glaucoma admitted in the hospital with cc of vertigo. Patient last night woke up with spinning sensation  and as it continued - patient decided to come in the hospital. Patient denies any HA, CP, SOB. No recent infections. No motor / sensory deficits. Maintains normal conversation.     Pending MRI of the head. Neurology eval

## 2024-11-11 NOTE — SWALLOW BEDSIDE ASSESSMENT ADULT - COMMENTS
The pt was admitted to  with right sided headache, dizziness and UE incoordination. Neuro w/u in progress. He has a selected underlying h/o depression, anxiety, SVT s/p ablation, RBBB, PVC's, mitral stenosis, HLD, hypothyroidism, asthma, GERD, hiatal hernia, IBS, asthma, nephrolithiasis, bladder neck stenosis, BPH s/p TURP, narrow angle glaucoma, and lumbar stenosis s/p fusion. See below for additional prior medical information.

## 2024-11-11 NOTE — CONSULT NOTE ADULT - ASSESSMENT
Patient is a 73 y/o male with past medical history of memory issues, word finding issues in the past (neg MRI brain per pt) , SVT ablation, IBS,  hypothyroidism, asthma, incipient narrow angle glaucoma presents with dizziness.  11/10 patient woke up with spinning sensation and R sided headache and LLQ intermittent pain, and dry mouth.  He could not get out of bed for 2 hours because of the dizziness.  This lasted 2-3 hours then when he did get out of bed he had an unsteady gait.  Denies dysarthria, facial droop, diplopia, focal weakness or nembness.  He has since then noticed b/l resting tremor UE's, difficulty with hand coordination (tying things, writing, but can  a glass), and continued intermittent b/l UE paresthesias, and still has unsteady gait.  CT head was neg for acute findings.  He is pending MRI brain to r/o stroke.  His vertigo is resolved.  On PE there are no focal deficits.  He has resting hand tremors that are mild, and memory deficits.      #presented with dizziness/vertigo and unsteady gait-will need to r/o cerebellar stroke      Recommendations  -Monitor on tele  -Neurochecks/VS q 4  -Continue ASA 81mg QD, statin QD (will titrate if MRI+ for stroke)  -DVT prophylaxis  -Check A1c, LDL  -Permissive HTN for 24 hours then gradual normotension  -Check A1c, LDL, TSH, B12  -F/U MRI brain  -F/U TTE  -PT/OT eval  -will need outpatient cognitive testing/follow up with Neurology  -will follow    D/W Dr. Barr Patient is a 73 y/o male with past medical history of memory issues, word finding issues in the past (neg MRI brain per pt) , SVT ablation, IBS,  hypothyroidism, asthma, incipient narrow angle glaucoma, L2-L5 spinal fusion presents with dizziness.  11/10 patient woke up with spinning sensation and R sided headache and LLQ intermittent pain, and dry mouth.  He could not get out of bed for 2 hours because of the dizziness.  This lasted 2-3 hours then when he did get out of bed he had an unsteady gait.  Denies dysarthria, facial droop, diplopia, focal weakness or nembness.  He has since then noticed b/l resting tremor UE's, difficulty with hand coordination (tying things, writing, but can  a glass), and continued intermittent b/l UE paresthesias, and still has unsteady gait.  CT head was neg for acute findings.  He is pending MRI brain to r/o stroke.  His vertigo is resolved.  On PE there are no focal deficits.  He has resting hand tremors that are mild, and memory deficits.      #presented with dizziness/vertigo and unsteady gait-will need to r/o cerebellar stroke      Recommendations  -Monitor on tele  -Neurochecks/VS q 4  -Continue ASA 81mg QD, statin QD (will titrate if MRI+ for stroke)  -DVT prophylaxis  -Check A1c, LDL  -Permissive HTN for 24 hours then gradual normotension  -Check A1c, LDL, TSH, B12  -F/U MRI brain  -F/U TTE  -PT/OT eval  -will need outpatient cognitive testing/follow up with Neurology  -will follow    D/W Dr. Barr

## 2024-11-11 NOTE — SWALLOW BEDSIDE ASSESSMENT ADULT - ADDITIONAL RECOMMENDATIONS
1) NEURO F/U    2) PT WITH [place] ON LEFT TONGUE DORSUM. QUESTION IF THIS IS THRUSH THROUGH HE DENIED ORAL DISCOMFORT OR DYSGEUSIA ON EXAM. SUGGEST HOSPITALIST CHECK ORAL CAVITY.

## 2024-11-11 NOTE — CONSULT NOTE ADULT - SUBJECTIVE AND OBJECTIVE BOX
Patient is a 74y old  Male who presents with a chief complaint of Dizziness, confusion, ?vertigo vs TIA (11 Nov 2024 10:22)      HPI:  Patient is a 75 y/o male with past medical history of first degree AV block, PVCs, mitral valve stenosis, IBS, GERD, history of duadenal bleeding, hypothyroidism, cough variant asthma, incipient narrow angle glaucoma admitted in the hospital with cc of vertigo. Patient last night woke up with spinning sensation  and as it continued - patient decided to come in the hospital. Patient denies any HA, CP, SOB. No recent infections. No motor / sensory deficits. Maintains normal conversation.     Pending MRI of the head. Neurology eval (11 Nov 2024 07:44)      PAST MEDICAL & SURGICAL HISTORY:  Anxiety disorder      Hypothyroid      Back pain      Lumbar disc herniation      Lumbar radiculopathy      Hiatal hernia      Constipation      Depression      BPH (benign prostatic hyperplasia)      Renal cyst  left  and right      Bundle branch block  RBBB      Mitral stenosis      Cataract  Early      History of narrow angle glaucoma      Cough variant asthma      IBS (irritable bowel syndrome)  IBS-C      Diverticulosis      Bladder neck stenosis, congenital      H/O bursitis  Both shoulders      Gastroesophageal reflux disease with hiatal hernia      H/O: GI bleed      Sustained SVT      PVC (premature ventricular contraction)      HLD (hyperlipidemia)      Hip bursitis, left      Renal calculus, left      Elective surgery  lumbar PEREZ x 2      H/O right inguinal hernia repair  2008      S/P colonoscopy  2021      H/O endoscopy  08/2019      S/P lumbar spinal fusion  2018      History of cystoscopy  TURP---8/28/2019      S/P TURP (transurethral resection of prostate)      H/O spinal fusion      H/O left inguinal hernia repair          FAMILY HISTORY:  Family history of diabetes mellitus (Mother)        Social Hx:  Nonsmoker, no drug or alcohol use    MEDICATIONS  (STANDING):  aspirin enteric coated 81 milliGRAM(s) Oral daily  buPROPion XL (24-Hour) . 300 milliGRAM(s) Oral daily  heparin   Injectable 5000 Unit(s) SubCutaneous every 12 hours  levothyroxine 50 MICROGram(s) Oral daily  pantoprazole    Tablet 40 milliGRAM(s) Oral before breakfast  rosuvastatin 10 milliGRAM(s) Oral at bedtime  tamsulosin 0.4 milliGRAM(s) Oral at bedtime       Allergies    propofol (Other)  ampicillin (Unknown)  oxybutynin (Other)  antichlolinergics (Unknown)  cortisone (Other)  clindamycin (Other)  Lexapro (Other)  penicillin (Rash)    Intolerances    Percocet (Other)  Dilaudid (Other)      ROS: Pertinent positives in HPI, all other ROS were reviewed and are negative.      Vital Signs Last 24 Hrs  T(C): 36.9 (11 Nov 2024 11:11), Max: 37.2 (11 Nov 2024 07:15)  T(F): 98.5 (11 Nov 2024 11:11), Max: 98.9 (11 Nov 2024 07:15)  HR: 78 (11 Nov 2024 11:11) (78 - 91)  BP: 126/70 (11 Nov 2024 11:11) (126/70 - 145/86)  BP(mean): 94 (11 Nov 2024 10:30) (93 - 98)  RR: 18 (11 Nov 2024 11:11) (16 - 18)  SpO2: 96% (11 Nov 2024 11:11) (96% - 99%)    Parameters below as of 11 Nov 2024 11:11  Patient On (Oxygen Delivery Method): room air            Constitutional: awake and alert.  HEENT: PERRLA, EOMI,   Neck: Supple.  Respiratory: Breath sounds are clear bilaterally  Cardiovascular: S1 and S2, regular / irregular rhythm  Gastrointestinal: soft, nontender  Extremities:  no edema  Vascular: Caritid Bruit - no  Musculoskeletal: no joint swelling/tenderness, no abnormal movements  Skin: No rashes    Neurological exam:  HF: A x O x 3. Appropriately interactive, normal affect. Speech fluent, No Aphasia or paraphasic errors. Naming /repetition intact   CN: JEOVN, EOMI, VFF, facial sensation normal, no NLFD, tongue midline, Palate moves equally, SCM equal bilaterally  Motor: No pronator drift, Strength 5/5 in all 4 ext, normal bulk and tone, no tremor, rigidity or bradykinesia.    Sens: Intact to light touch / PP/ VS/ JS    Reflexes: Symmetric and normal . BJ 2+, BR 2+, KJ 2+, AJ 2+, downgoing toes b/l  Coord:  No FNFA, dysmetria, CICI intact   Gait/Balance: Normal/Cannot test    NIHSS:          Labs:   11-10    140  |  109[H]  |  12  ----------------------------<  90  4.1   |  27  |  0.95    Ca    9.6      10 Nov 2024 21:40                                13.9   6.92  )-----------( 160      ( 10 Nov 2024 21:40 )             41.8       Radiology:  - CT Head:  - MRI brain  -MRA brain/Carotids  - EEG    A/P:    No IV tpa given because…    -ASA/PLAVIX  -Atorvastatin  -DVT prophylaxis  -Dysphagia screen  -Speech and swallow eval  -PT eval/ rehab eval    Mangement d/w Pt / family /     Total Critical Care Time spent:   CC: vertigo      HPI:  Patient is a 73 y/o male with past medical history of memory issues, word finding issues in the past (neg MRI brain per pt) , SVT ablation, IBS,  hypothyroidism, asthma, incipient narrow angle glaucoma presents with dizziness.  11/10 patient woke up with spinning sensation and R sided headache and LLQ intermittent pain, and dry mouth.  He could not get out of bed for 2 hours because of the dizziness.  This lasted 2-3 hours then when he did get out of bed he had an unsteady gait.  Denies dysarthria, facial droop, diplopia, focal weakness or nembness.  He has since then noticed b/l resting tremor UE's, difficulty with hand coordination (tying things, writing, but can  a glass), and continued intermittent b/l UE paresthesias, and still has unsteady gait.  CT head was neg for acute findings.  He is pending MRI brain to r/o stroke.  His vertigo is resolved.             PAST MEDICAL & SURGICAL HISTORY:  Anxiety disorder      Hypothyroid      Back pain      Lumbar disc herniation      Lumbar radiculopathy      Hiatal hernia      Constipation      Depression      BPH (benign prostatic hyperplasia)      Renal cyst  left  and right      Bundle branch block  RBBB      Mitral stenosis      Cataract  Early      History of narrow angle glaucoma      Cough variant asthma      IBS (irritable bowel syndrome)  IBS-C      Diverticulosis      Bladder neck stenosis, congenital      H/O bursitis  Both shoulders      Gastroesophageal reflux disease with hiatal hernia      H/O: GI bleed      Sustained SVT      PVC (premature ventricular contraction)      HLD (hyperlipidemia)      Hip bursitis, left      Renal calculus, left      Elective surgery  lumbar PEREZ x 2      H/O right inguinal hernia repair  2008      S/P colonoscopy  2021      H/O endoscopy  08/2019      S/P lumbar spinal fusion  2018      History of cystoscopy  TURP---8/28/2019      S/P TURP (transurethral resection of prostate)      H/O spinal fusion      H/O left inguinal hernia repair          FAMILY HISTORY:  Family history of diabetes mellitus (Mother)        Social Hx:  Nonsmoker, no drug or alcohol use    MEDICATIONS  (STANDING):  aspirin enteric coated 81 milliGRAM(s) Oral daily  buPROPion XL (24-Hour) . 300 milliGRAM(s) Oral daily  heparin   Injectable 5000 Unit(s) SubCutaneous every 12 hours  levothyroxine 50 MICROGram(s) Oral daily  pantoprazole    Tablet 40 milliGRAM(s) Oral before breakfast  rosuvastatin 10 milliGRAM(s) Oral at bedtime  tamsulosin 0.4 milliGRAM(s) Oral at bedtime       Allergies  propofol (Other)  ampicillin (Unknown)  oxybutynin (Other)  antichlolinergics (Unknown)  cortisone (Other)  clindamycin (Other)  Lexapro (Other)  penicillin (Rash)    Intolerances  Percocet (Other)  Dilaudid (Other)      ROS: Pertinent positives in HPI, all other ROS were reviewed and are negative.      Vital Signs Last 24 Hrs  T(C): 36.9 (11 Nov 2024 11:11), Max: 37.2 (11 Nov 2024 07:15)  T(F): 98.5 (11 Nov 2024 11:11), Max: 98.9 (11 Nov 2024 07:15)  HR: 78 (11 Nov 2024 11:11) (78 - 91)  BP: 126/70 (11 Nov 2024 11:11) (126/70 - 145/86)  BP(mean): 94 (11 Nov 2024 10:30) (93 - 98)  RR: 18 (11 Nov 2024 11:11) (16 - 18)  SpO2: 96% (11 Nov 2024 11:11) (96% - 99%)    Parameters below as of 11 Nov 2024 11:11  Patient On (Oxygen Delivery Method): room air      GEN:   Constitutional: awake, NAD, well-groomed  HEAD: Normocephalic  Neck: Supple.  Extremities:  no edema  Musculoskeletal: resting b/l hand tremors  Skin: No rashes    Neurological exam:  HF: A x O x 3. Appropriately interactive, normal affect. Speech fluent, No Aphasia or paraphasic errors. Naming /repetition intact   CN: JEVON, EOMI, VFF, facial sensation normal, no NLFD, tongue midline, Palate moves equally, SCM equal bilaterally  Motor: No pronator drift, Strength 5/5 in all 4 ext, normal bulk and tone, no tremor, rigidity or bradykinesia.    Sens: Intact to light touch / PP/ VS/ JS    Reflexes: Symmetric and normal,  downgoing toes b/l  Coord:  No FNFA, dysmetria, CICI intact   Gait/Balance: Cannot test    NIHSS: 0          Labs:   11-10    140  |  109[H]  |  12  ----------------------------<  90  4.1   |  27  |  0.95    Ca    9.6      10 Nov 2024 21:40                            13.9   6.92  )-----------( 160      ( 10 Nov 2024 21:40 )             41.8       Radiology:  < from: CT Head No Cont (11.10.24 @ 23:05) >  IMPRESSION:    No evidence of acute intracranial hemorrhage, midline shift or CT   evidence of acute territorial infarct.    If the patient's symptoms persist, consider short interval follow-up head   CT or brain MRI if there are no MRI contraindications.             CC: vertigo      HPI:  Patient is a 73 y/o male with past medical history of memory issues, word finding issues in the past (neg MRI brain per pt) , SVT ablation, IBS,  hypothyroidism, asthma, incipient narrow angle glaucoma, L2-L5 spinal fusion presents with dizziness.  11/10 patient woke up with spinning sensation and R sided headache and LLQ intermittent pain, and dry mouth.  He could not get out of bed for 2 hours because of the dizziness.  This lasted 2-3 hours then when he did get out of bed he had an unsteady gait.  Denies dysarthria, facial droop, diplopia, focal weakness or nembness.  He has since then noticed b/l resting tremor UE's, difficulty with hand coordination (tying things, writing, but can  a glass), and continued intermittent b/l UE paresthesias, and still has unsteady gait.  CT head was neg for acute findings.  He is pending MRI brain to r/o stroke.  His vertigo is resolved.             PAST MEDICAL & SURGICAL HISTORY:  Anxiety disorder      Hypothyroid      Back pain      Lumbar disc herniation      Lumbar radiculopathy      Hiatal hernia      Constipation      Depression      BPH (benign prostatic hyperplasia)      Renal cyst  left  and right      Bundle branch block  RBBB      Mitral stenosis      Cataract  Early      History of narrow angle glaucoma      Cough variant asthma      IBS (irritable bowel syndrome)  IBS-C      Diverticulosis      Bladder neck stenosis, congenital      H/O bursitis  Both shoulders      Gastroesophageal reflux disease with hiatal hernia      H/O: GI bleed      Sustained SVT      PVC (premature ventricular contraction)      HLD (hyperlipidemia)      Hip bursitis, left      Renal calculus, left      Elective surgery  lumbar PEREZ x 2      H/O right inguinal hernia repair  2008      S/P colonoscopy  2021      H/O endoscopy  08/2019      S/P lumbar spinal fusion  2018      History of cystoscopy  TURP---8/28/2019      S/P TURP (transurethral resection of prostate)      H/O spinal fusion      H/O left inguinal hernia repair          FAMILY HISTORY:  Family history of diabetes mellitus (Mother)        Social Hx:  Nonsmoker, no drug or alcohol use    MEDICATIONS  (STANDING):  aspirin enteric coated 81 milliGRAM(s) Oral daily  buPROPion XL (24-Hour) . 300 milliGRAM(s) Oral daily  heparin   Injectable 5000 Unit(s) SubCutaneous every 12 hours  levothyroxine 50 MICROGram(s) Oral daily  pantoprazole    Tablet 40 milliGRAM(s) Oral before breakfast  rosuvastatin 10 milliGRAM(s) Oral at bedtime  tamsulosin 0.4 milliGRAM(s) Oral at bedtime       Allergies  propofol (Other)  ampicillin (Unknown)  oxybutynin (Other)  antichlolinergics (Unknown)  cortisone (Other)  clindamycin (Other)  Lexapro (Other)  penicillin (Rash)    Intolerances  Percocet (Other)  Dilaudid (Other)      ROS: Pertinent positives in HPI, all other ROS were reviewed and are negative.      Vital Signs Last 24 Hrs  T(C): 36.9 (11 Nov 2024 11:11), Max: 37.2 (11 Nov 2024 07:15)  T(F): 98.5 (11 Nov 2024 11:11), Max: 98.9 (11 Nov 2024 07:15)  HR: 78 (11 Nov 2024 11:11) (78 - 91)  BP: 126/70 (11 Nov 2024 11:11) (126/70 - 145/86)  BP(mean): 94 (11 Nov 2024 10:30) (93 - 98)  RR: 18 (11 Nov 2024 11:11) (16 - 18)  SpO2: 96% (11 Nov 2024 11:11) (96% - 99%)    Parameters below as of 11 Nov 2024 11:11  Patient On (Oxygen Delivery Method): room air      GEN:   Constitutional: awake, NAD, well-groomed  HEAD: Normocephalic  Neck: Supple.  Extremities:  no edema  Musculoskeletal: resting b/l hand tremors  Skin: No rashes    Neurological exam:  HF: A x O x 3. Appropriately interactive, normal affect. Speech fluent, No Aphasia or paraphasic errors. Naming /repetition intact   CN: JEVON, EOMI, VFF, facial sensation normal, no NLFD, tongue midline, Palate moves equally, SCM equal bilaterally  Motor: No pronator drift, Strength 5/5 in all 4 ext, normal bulk and tone, no tremor, rigidity or bradykinesia.    Sens: Intact to light touch / PP/ VS/ JS    Reflexes: Symmetric and normal,  downgoing toes b/l  Coord:  No FNFA, dysmetria, CICI intact   Gait/Balance: Cannot test    NIHSS: 0          Labs:   11-10    140  |  109[H]  |  12  ----------------------------<  90  4.1   |  27  |  0.95    Ca    9.6      10 Nov 2024 21:40                            13.9   6.92  )-----------( 160      ( 10 Nov 2024 21:40 )             41.8       Radiology:  < from: CT Head No Cont (11.10.24 @ 23:05) >  IMPRESSION:    No evidence of acute intracranial hemorrhage, midline shift or CT   evidence of acute territorial infarct.    If the patient's symptoms persist, consider short interval follow-up head   CT or brain MRI if there are no MRI contraindications.

## 2024-11-11 NOTE — SWALLOW BEDSIDE ASSESSMENT ADULT - SWALLOW EVAL: DIAGNOSIS
1) The pt exhibits Oropharyngeal Swallowing integrity which subjectively appears to be within functional parameters for age. Bolus formation/transfer were mechanically functional for age w/o focality, swallow was triggered in an acceptable time frame for age and laryngeal lift on palpation during swallowing trials was felt to be functional for age as well. No behavioral aspiration signs exhibited. Odynophagia denied. Pt's oropharyngeal swallowing integrity appears to be stable for age.

## 2024-11-11 NOTE — H&P ADULT - NSHPLABSRESULTS_GEN_ALL_CORE
CBC Full  -  ( 10 Nov 2024 21:40 )  WBC Count : 6.92 K/uL  RBC Count : 4.44 M/uL  Hemoglobin : 13.9 g/dL  Hematocrit : 41.8 %  Platelet Count - Automated : 160 K/uL  Mean Cell Volume : 94.1 fl  Mean Cell Hemoglobin : 31.3 pg  Mean Cell Hemoglobin Concentration : 33.3 g/dL  Auto Neutrophil # : 4.87 K/uL  Auto Lymphocyte # : 1.11 K/uL  Auto Monocyte # : 0.66 K/uL  Auto Eosinophil # : 0.18 K/uL  Auto Basophil # : 0.08 K/uL  Auto Neutrophil % : 70.4 %  Auto Lymphocyte % : 16.0 %  Auto Monocyte % : 9.5 %  Auto Eosinophil % : 2.6 %  Auto Basophil % : 1.2 %      Urinalysis Basic - ( 10 Nov 2024 21:40 )    Color: x / Appearance: x / SG: x / pH: x  Gluc: 90 mg/dL / Ketone: x  / Bili: x / Urobili: x   Blood: x / Protein: x / Nitrite: x   Leuk Esterase: x / RBC: x / WBC x   Sq Epi: x / Non Sq Epi: x / Bacteria: x      11-10    140  |  109[H]  |  12  ----------------------------<  90  4.1   |  27  |  0.95    Ca    9.6      10 Nov 2024 21:40

## 2024-11-11 NOTE — PHYSICAL THERAPY INITIAL EVALUATION ADULT - GENERAL OBSERVATIONS, REHAB EVAL
Supine in bed wears eyeglasses,he is tall and lean ,pale ,oral mucosa DRY ,portable HM in place , HR=84 ; texting on cell phone with "life-partner" Billie who thinks his acid reflux/GERD/gut health contributing to other issues ,notes he takes Protonix daily; reports dizziness resolved, only had on awakening such that he couldn't get out of bed for 2 hours

## 2024-11-11 NOTE — PHYSICAL THERAPY INITIAL EVALUATION ADULT - ADDITIONAL COMMENTS
pt was attending OP PT prior to admission at [facility] ( Rom Alves ,KEVIN) for h/o complete tear L gluteus medius mm s/p repair ( Dr ESTER Ariza)

## 2024-11-12 ENCOUNTER — TRANSCRIPTION ENCOUNTER (OUTPATIENT)
Age: 74
End: 2024-11-12

## 2024-11-12 ENCOUNTER — NON-APPOINTMENT (OUTPATIENT)
Age: 74
End: 2024-11-12

## 2024-11-12 VITALS
HEART RATE: 70 BPM | SYSTOLIC BLOOD PRESSURE: 122 MMHG | RESPIRATION RATE: 18 BRPM | DIASTOLIC BLOOD PRESSURE: 74 MMHG | TEMPERATURE: 98 F | OXYGEN SATURATION: 97 %

## 2024-11-12 LAB
ANION GAP SERPL CALC-SCNC: 6 MMOL/L — SIGNIFICANT CHANGE UP (ref 5–17)
BUN SERPL-MCNC: 9 MG/DL — SIGNIFICANT CHANGE UP (ref 7–23)
CALCIUM SERPL-MCNC: 9.5 MG/DL — SIGNIFICANT CHANGE UP (ref 8.5–10.1)
CHLORIDE SERPL-SCNC: 106 MMOL/L — SIGNIFICANT CHANGE UP (ref 96–108)
CHOLEST SERPL-MCNC: 150 MG/DL — SIGNIFICANT CHANGE UP
CO2 SERPL-SCNC: 26 MMOL/L — SIGNIFICANT CHANGE UP (ref 22–31)
CREAT SERPL-MCNC: 0.91 MG/DL — SIGNIFICANT CHANGE UP (ref 0.5–1.3)
EGFR: 89 ML/MIN/1.73M2 — SIGNIFICANT CHANGE UP
GLUCOSE SERPL-MCNC: 81 MG/DL — SIGNIFICANT CHANGE UP (ref 70–99)
HDLC SERPL-MCNC: 73 MG/DL — SIGNIFICANT CHANGE UP
LIPID PNL WITH DIRECT LDL SERPL: 65 MG/DL — SIGNIFICANT CHANGE UP
NON HDL CHOLESTEROL: 77 MG/DL — SIGNIFICANT CHANGE UP
POTASSIUM SERPL-MCNC: 3.8 MMOL/L — SIGNIFICANT CHANGE UP (ref 3.5–5.3)
POTASSIUM SERPL-SCNC: 3.8 MMOL/L — SIGNIFICANT CHANGE UP (ref 3.5–5.3)
SODIUM SERPL-SCNC: 138 MMOL/L — SIGNIFICANT CHANGE UP (ref 135–145)
TRIGL SERPL-MCNC: 61 MG/DL — SIGNIFICANT CHANGE UP

## 2024-11-12 PROCEDURE — 99233 SBSQ HOSP IP/OBS HIGH 50: CPT

## 2024-11-12 PROCEDURE — 99238 HOSP IP/OBS DSCHRG MGMT 30/<: CPT

## 2024-11-12 RX ORDER — ONDANSETRON 8 MG/1
8 TABLET, ORALLY DISINTEGRATING ORAL 3 TIMES DAILY
Qty: 8 | Refills: 1 | Status: ACTIVE | COMMUNITY
Start: 2024-11-12 | End: 1900-01-01

## 2024-11-12 RX ORDER — MECLIZINE HYDROCHLORIDE 25 MG/1
25 TABLET ORAL
Qty: 30 | Refills: 2 | Status: ACTIVE | COMMUNITY
Start: 2024-11-12 | End: 1900-01-01

## 2024-11-12 RX ADMIN — Medication 300 MILLIGRAM(S): at 10:23

## 2024-11-12 RX ADMIN — Medication 81 MILLIGRAM(S): at 05:54

## 2024-11-12 RX ADMIN — PANTOPRAZOLE SODIUM 40 MILLIGRAM(S): 40 TABLET, DELAYED RELEASE ORAL at 05:54

## 2024-11-12 RX ADMIN — Medication 50 MICROGRAM(S): at 05:54

## 2024-11-12 NOTE — DISCHARGE NOTE PROVIDER - NSDCMRMEDTOKEN_GEN_ALL_CORE_FT
buPROPion 300 mg/24 hours (XL) oral tablet, extended release: 1 tab(s) orally every 24 hours  clonazePAM 1 mg oral tablet: 0.5 tab(s) orally 2 times a day as needed  famotidine 40 mg oral tablet: 1 tab(s) orally once a day as needed  levothyroxine 50 mcg (0.05 mg) oral tablet: 1 tab(s) orally once a day  Lovaza 1000 mg oral capsule: 1 cap(s) orally once a day  MiraLax oral powder for reconstitution: 17 gram(s) orally once a day, As Needed  pantoprazole 40 mg oral delayed release tablet: 1 tab(s) orally once a day  rosuvastatin 5 mg oral tablet: 1 tab(s) orally once a day (at bedtime)  tamsulosin 0.4 mg oral capsule: 1 cap(s) orally once a day (at bedtime)  Vitamin D3 2000 intl units oral tablet: 1 tab(s) orally once a day

## 2024-11-12 NOTE — PROGRESS NOTE ADULT - NS ATTEND AMEND GEN_ALL_CORE FT
Patient discussed with DAYSI Fuller, and examined at the bedside.     No recurrence of vertigo.     Had an MRI brain this AM without any acute findings.     On exam:   GEN: NAD  cV; rRR, S1, s2  PULM: CTDAB  NEURO:   Awake, alert, oriented, normal affect and insight.    Pupils 3-2mm, normal EOM, no  nystagmus, No vertigo on Carlos hallpike maneuver, and no vertigo.  No saccadic intrusions on head impulse.  no skew deviation.   MOTOR: normal bulk and tone, no drift, 5/5 throughout to confrontation  COORDINATION: normal FNF, no dysdiadochokinesia.      CTH images reviewed; no hemorrhage.  No large territorial infarct.     AP: 74 man with hypothyroid, SVT, asthma, glaucoma, experiencing severe persistent vertigo, lasting about 2 hours, on AM of 11/10, self resolved, without recurrence.  On exam, no neurological deficits.  No cerebellar signs, and no findings on provocative maneuvers.  No acute findings on the CTH.    Acute vestibular syndrome.  No evidence for central cause at this point.  Symptoms resolved.    -no further inpatient neurology recommendations anticipated at this point.  please page or call with any acute neuro changes, or other issues we can help address.

## 2024-11-12 NOTE — DISCHARGE NOTE NURSING/CASE MANAGEMENT/SOCIAL WORK - NSDCPEFALRISK_GEN_ALL_CORE
For information on Fall & Injury Prevention, visit: [email or web address] OR  [email or web address] OR  https://www.cdc.gov/steadi/patient.html

## 2024-11-12 NOTE — DISCHARGE NOTE PROVIDER - NSDCCPTREATMENT_GEN_ALL_CORE_FT
PRINCIPAL PROCEDURE  Procedure: MRI head  Findings and Treatment: No acute hemorrhage mass mass effect or acute territorial   infarcts are seen.

## 2024-11-12 NOTE — DISCHARGE NOTE NURSING/CASE MANAGEMENT/SOCIAL WORK - PATIENT PORTAL LINK FT
You can access the FollowMyHealth Patient Portal offered by [facility] by registering at the following website: [email or web address]. By joining [email or web address] [email or web address] portal, you will also be able to view your health information using other applications (apps) compatible with our system.

## 2024-11-12 NOTE — PROGRESS NOTE ADULT - ASSESSMENT
73yo M with PMhx as above presents due to confusion and dizziness and hand shaking.    -MRI negative for CVA - reassured. No events on tele - reassured  -Likely from vertigo/labyrinthitis He is already established with Dr. Lee with plans for close follow-up.  -Symptoms much improved and reports being able to walk with walker and feels OK to go home.  -Consider meclizine PRN to help with vertigo symptoms   -C/W hydration and encouraging PO intake  -Dispo as per primary team. OK to D/C from cardiac standpoint
Patient is a 73 y/o male with past medical history of memory issues, word finding issues in the past (neg MRI brain per pt) , SVT ablation, IBS,  hypothyroidism, asthma, incipient narrow angle glaucoma, L2-L5 spinal fusion presents with dizziness.  11/10 patient woke up with spinning sensation and R sided headache and LLQ intermittent pain, and dry mouth.  He could not get out of bed for 2 hours because of the dizziness.  This lasted 2-3 hours then when he did get out of bed he had an unsteady gait.  Denies dysarthria, facial droop, diplopia, focal weakness or nembness.  He has since then noticed b/l resting tremor UE's, difficulty with hand coordination (tying things, writing, but can  a glass), and continued intermittent b/l UE paresthesias, and still has unsteady gait.  CT head was neg for acute findings.  He is pending MRI brain to r/o stroke.  His vertigo is resolved.  On PE there are no focal deficits.  He has resting hand tremors that are mild, and memory deficits.      #vestibular syndrome-resolved  -MRI neg for acute stroke      Recommendations  -PT  -outpatient vestibular therapy  -will need outpatient cognitive testing/follow up with Neurology  -will sign off.  Please page or call for any questions    D/W Dr. Barr, patient

## 2024-11-12 NOTE — PROGRESS NOTE ADULT - SUBJECTIVE AND OBJECTIVE BOX
No acute events overnight, no new complaints.  MRI is neg for acute stroke.  Dizziness is resolved    ROS: As stated above otherwise neg    MEDICATIONS  (STANDING):  aspirin enteric coated 81 milliGRAM(s) Oral daily  buPROPion XL (24-Hour) . 300 milliGRAM(s) Oral daily  heparin   Injectable 5000 Unit(s) SubCutaneous every 12 hours  levothyroxine 50 MICROGram(s) Oral daily  pantoprazole    Tablet 40 milliGRAM(s) Oral before breakfast  rosuvastatin 10 milliGRAM(s) Oral at bedtime  tamsulosin 0.4 milliGRAM(s) Oral at bedtime      Vital Signs Last 24 Hrs  T(C): 36.9 (12 Nov 2024 08:06), Max: 37.2 (11 Nov 2024 16:46)  T(F): 98.4 (12 Nov 2024 08:06), Max: 99 (11 Nov 2024 16:46)  HR: 70 (12 Nov 2024 08:06) (70 - 84)  BP: 122/74 (12 Nov 2024 08:06) (119/76 - 128/77)  BP(mean): --  RR: 18 (12 Nov 2024 08:06) (18 - 18)  SpO2: 97% (12 Nov 2024 08:06) (94% - 97%)    Parameters below as of 12 Nov 2024 08:06  Patient On (Oxygen Delivery Method): room air      Neurological exam:  HF: A x O x 3. Appropriately interactive, normal affect. Speech fluent, No Aphasia or paraphasic errors. Naming /repetition intact   CN: JEVON, EOMI, VFF, facial sensation normal, no NLFD, tongue midline, Palate moves equally.  Motor: No pronator drift, Strength 5/5 in all 4 ext, normal bulk and tone, no tremors  Sens: Intact to light touch   Reflexes: Symmetric and normal,  downgoing toes b/l  Coord:  No FNFA, dysmetria, CICI intact   Gait/Balance: Cannot test    NIHSS: 0                        13.9   6.92  )-----------( 160      ( 10 Nov 2024 21:40 )             41.8     11-12    138  |  106  |  9   ----------------------------<  81  3.8   |  26  |  0.91    Ca    9.5      12 Nov 2024 06:51        11-12 Chol 150 LDL -- HDL 73 Trig 61    Radiology report:  < from: MR Head No Cont (11.11.24 @ 18:20) >  IMPRESSION: No acute hemorrhage mass mass effect or acute territorial   infarcts are seen.    < from: CT Head No Cont (11.10.24 @ 23:05) >  IMPRESSION:    No evidence of acute intracranial hemorrhage, midline shift or CT   evidence of acute territorial infarct.    If the patient's symptoms persist, consider short interval follow-up head   CT or brain MRI if there are no MRI contraindications.
CHIEF COMPLAINT:  Patient is a 74y old  Male who presents with a chief complaint of dizziness and confusion    HPI:  Patient is a 73 y/o male with past medical history of first degree AV block, PVCs, mitral valve stenosis, IBS, GERD, history of duadenal bleeding, hypothyroidism, HLD, cough variant asthma, incipient narrow angle glaucoma who presented c/o confusion and difficulty focusing with some dizziness. 11/10/24 he work up and felt like he could not focus with things moving. He felt very dizzy and unsteady and was struggling with doing tasks, like taking his phone off "do not disturb". His hands are very shaky and he feels uneasy in his stomach. He is able to swallow pills and liquids but when he tries to have some food he just feels nauseous and doesn't want to swallow/eat. He also has been having intermittent shooting pains into his hands/fingers B/L.    Symptoms continued so he came to the ER. He denies any recent illness or falls/trauma to the head.     24: No acute events O/N. MRI negative for CVA. Patient's symptoms improving    PMHx:  PAST MEDICAL & SURGICAL HISTORY:  Anxiety disorder  Hypothyroid  Back pain  Lumbar disc herniation  Lumbar radiculopathy  Hiatal hernia  Constipation  Depression  BPH (benign prostatic hyperplasia)  Renal cyst left  and right  Bundle branch block RBBB  Mitral stenosis  Cataract Early  History of narrow angle glaucoma  Cough variant asthma  IBS (irritable bowel syndrome) IBS-C  Diverticulosis  Bladder neck stenosis, congenital  H/O bursitis Both shoulders  Gastroesophageal reflux disease with hiatal hernia  H/O: GI bleed  Sustained SVT s/p ablation 10/2023  PVC (premature ventricular contraction)  HLD (hyperlipidemia)  Hip bursitis, left  Renal calculus, left  Elective surgery lumbar PEREZ x 2  H/O right inguinal hernia repair   S/P lumbar spinal fusion 2018  History of cystoscopy TURP---2019  H/O left inguinal hernia repair    FAMILY HISTORY:   FAMILY HISTORY:  Family history of diabetes mellitus (Mother)    ALLERGIES:  Allergies  propofol (Other)  ampicillin (Unknown)  oxybutynin (Other)  antichlolinergics (Unknown)  cortisone (Other)  clindamycin (Other)  Lexapro (Other)  penicillin (Rash)    Intolerances  Percocet (Other)  Dilaudid (Other)    REVIEW OF SYSTEMS:  10 system ROS was obtained, all pertinent positives and negatives are in HPI otherwise they were negative.    Vital Signs Last 24 Hrs  T(C): 36.9 (2024 08:06), Max: 37.2 (2024 16:46)  T(F): 98.4 (2024 08:06), Max: 99 (2024 16:46)  HR: 70 (2024 08:06) (70 - 84)  BP: 122/74 (2024 08:06) (119/76 - 132/82)  BP(mean): 94 (2024 10:30) (94 - 94)  RR: 18 (2024 08:06) (16 - 18)  SpO2: 97% (2024 08:06) (94% - 99%)    Parameters below as of 2024 08:06  Patient On (Oxygen Delivery Method): room air    CAPILLARY BLOOD GLUCOSE  POCT Blood Glucose.: 89 mg/dL (10 Nov 2024 20:37)      PHYSICAL EXAM:   Constitutional: awake and alert in NAD  HEENT: EOMI, Normal Hearing, MMM  Neck: Soft and supple, No LAD, No JVD, no carotid bruit  Respiratory: Breath sounds are clear bilaterally, No wheezing, rales or rhonchi, good air movement  Cardiovascular: RRR, soft systolic murumr at LSB and apex  Gastrointestinal: Bowel Sounds present, soft, nontender, nondistended, no guarding, no rebound  Extremities: Warm and well perfused, no peripheral edema  Neurological: A/O x 3, no focal deficits appreciated but having a lot of hand shaking  Skin: No rashes appreciated    MEDICATIONS  (STANDING):  aspirin enteric coated 81 milliGRAM(s) Oral daily  buPROPion XL (24-Hour) . 300 milliGRAM(s) Oral daily  heparin   Injectable 5000 Unit(s) SubCutaneous every 12 hours  levothyroxine 50 MICROGram(s) Oral daily  pantoprazole    Tablet 40 milliGRAM(s) Oral before breakfast  rosuvastatin 10 milliGRAM(s) Oral at bedtime  tamsulosin 0.4 milliGRAM(s) Oral at bedtime    MEDICATIONS  (PRN):  clonazePAM  Tablet 0.5 milliGRAM(s) Oral two times a day PRN Anxiety  famotidine    Tablet 40 milliGRAM(s) Oral daily PRN acid reflux symptoms  oxyCODONE    IR 5 milliGRAM(s) Oral every 6 hours PRN for severe pain  polyethylene glycol 3350 17 Gram(s) Oral daily PRN Constipation    LABS: All Labs Reviewed:                       13.9   6.92  )-----------( 160      ( 10 Nov 2024 21:40 )             41.8     1110    140  |  109[H]  |  12  ----------------------------<  90  4.1   |  27  |  0.95    Ca    9.6      10 Nov 2024 21:40     RADIOLOGY:    Reviewed in EMR    EK/10/24, my interpretation: NSR LAD IRBBB    TELEMETRY:    Reviewed and no significant events appreciated - remains in sinus

## 2024-11-12 NOTE — DISCHARGE NOTE NURSING/CASE MANAGEMENT/SOCIAL WORK - NSDCVIVACCINE_GEN_ALL_CORE_FT
influenza, injectable, quadrivalent, preservative free; 11-Sep-2018 12:57; Fabi Toledo (RN); [employer]; [id number] (Exp. Date: 30-Jun-2019); IntraMuscular; Deltoid Left.; 0.5 milliLiter(s); VIS (VIS Published: 07-Aug-2015, VIS Presented: 11-Sep-2018);

## 2024-11-12 NOTE — DISCHARGE NOTE PROVIDER - NSDCFUSCHEDAPPT_GEN_ALL_CORE_FT
YANELIS AMOS  [facility]  [address]  Scheduled Appointment: 11/13/2024    Wilberto Martinez  [facility]  [address]  Scheduled Appointment: 11/27/2024    Nate Taveras  [facility]  VASCULAR [address]  Scheduled Appointment: 01/27/2025     YANELIS AMOS  [facility]  [address]  Scheduled Appointment: 11/25/2024    Wilberto Martinez  [facility]  [address]  Scheduled Appointment: 11/27/2024    Nate Taveras  [facility]  VASCULAR [address]  Scheduled Appointment: 01/27/2025

## 2024-11-12 NOTE — DISCHARGE NOTE NURSING/CASE MANAGEMENT/SOCIAL WORK - INFLUENZA IMMUNIZATION DATE (APPROXIMATE):
Detail Level: Detailed 04-Nov-2024 Prescription Strength Graduated Compression Stockings Recommendations: The patient was counseled that prescription strength graduated compression stockings should be worn for all waking hours. They will follow up with a venous specialist to monitor graduated compression stocking usage and their symptoms.

## 2024-11-12 NOTE — DISCHARGE NOTE PROVIDER - HOSPITAL COURSE
Patient is a 75 y/o male with past medical history of first degree AV block, PVCs, mitral valve stenosis, IBS, GERD, history of duadenal bleeding, hypothyroidism, cough variant asthma, incipient narrow angle glaucoma admitted in the hospital with cc of vertigo. Patient last night woke up with spinning sensation  and as it continued - patient decided to come in the hospital. Patient denies any HA, CP, SOB. No recent infections. No motor / sensory deficits. Maintains normal conversation.     Physical Exam:   GENERAL APPEARANCE:  NAD, hemodynamically stable  T(C): 37.1 (11-12-24 @ 01:30), Max: 37.2 (11-11-24 @ 07:15)  HR: 79 (11-12-24 @ 01:30) (78 - 84)  BP: 120/67 (11-12-24 @ 01:30) (119/76 - 132/82)  RR: 18 (11-12-24 @ 01:30) (16 - 18)  SpO2: 97% (11-12-24 @ 01:30) (94% - 99%)  Wt(kg): --  HEENT:  Head is normocephalic    Skin:  Warm and dry without any rash   NECK:  Supple without lymphadenopathy.   HEART:  Regular rate and rhythm. normal S1 and S2, No M/R/G  LUNGS:  Good ins/exp effort, no W/R/R/C  ABDOMEN:  Soft, nontender, nondistended with good bowel sounds heard  EXTREMITIES:  Without cyanosis, clubbing or edema.   NEUROLOGICAL:  Gross nonfocal   Patient is a 75 y/o male with past medical history of first degree AV block, PVCs, mitral valve stenosis, IBS, GERD, history of duadenal bleeding, hypothyroidism, cough variant asthma, incipient narrow angle glaucoma admitted in the hospital with cc of vertigo. Patient last night woke up with spinning sensation  and as it continued - patient decided to come in the hospital. Patient denies any HA, CP, SOB. No recent infections. No motor / sensory deficits. Maintains normal conversation.     Complete resolution of symptoms. Ambulated without any difficulties.     Physical Exam:   GENERAL APPEARANCE:  NAD, hemodynamically stable  T(C): 37.1 (11-12-24 @ 01:30), Max: 37.2 (11-11-24 @ 07:15)  HR: 79 (11-12-24 @ 01:30) (78 - 84)  BP: 120/67 (11-12-24 @ 01:30) (119/76 - 132/82)  RR: 18 (11-12-24 @ 01:30) (16 - 18)  SpO2: 97% (11-12-24 @ 01:30) (94% - 99%)  Wt(kg): --  HEENT:  Head is normocephalic    Skin:  Warm and dry without any rash   NECK:  Supple without lymphadenopathy.   HEART:  Regular rate and rhythm. normal S1 and S2, No M/R/G  LUNGS:  Good ins/exp effort, no W/R/R/C  ABDOMEN:  Soft, nontender, nondistended with good bowel sounds heard  EXTREMITIES:  Without cyanosis, clubbing or edema.   NEUROLOGICAL:  Gross nonfocal

## 2024-11-12 NOTE — DISCHARGE NOTE PROVIDER - NSDCCPCAREPLAN_GEN_ALL_CORE_FT
PRINCIPAL DISCHARGE DIAGNOSIS  Diagnosis: Gait instability  Assessment and Plan of Treatment:       SECONDARY DISCHARGE DIAGNOSES  Diagnosis: Hypothyroid  Assessment and Plan of Treatment:     Diagnosis: Severe vertigo  Assessment and Plan of Treatment:      PRINCIPAL DISCHARGE DIAGNOSIS  Diagnosis: Gait instability  Assessment and Plan of Treatment: - stroke ruled out  - secondary to peripheral vertigo  - close follow up with neurology post hospital discharge      SECONDARY DISCHARGE DIAGNOSES  Diagnosis: Severe vertigo  Assessment and Plan of Treatment: - resolved    Diagnosis: Hypothyroid  Assessment and Plan of Treatment:

## 2024-11-12 NOTE — DISCHARGE NOTE NURSING/CASE MANAGEMENT/SOCIAL WORK - FINANCIAL ASSISTANCE
[facility] provides services at a reduced cost to those who are determined to be eligible through [facility]’s financial assistance program. Information regarding [facility]’s financial assistance program can be found by going to [email or web address] or by calling [phone].
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
## 2024-11-13 ENCOUNTER — APPOINTMENT (OUTPATIENT)
Dept: NEUROLOGY | Facility: CLINIC | Age: 74
End: 2024-11-13
Payer: MEDICARE

## 2024-11-13 VITALS
DIASTOLIC BLOOD PRESSURE: 80 MMHG | HEIGHT: 71 IN | TEMPERATURE: 98.2 F | BODY MASS INDEX: 24.5 KG/M2 | SYSTOLIC BLOOD PRESSURE: 132 MMHG | HEART RATE: 88 BPM | WEIGHT: 175 LBS

## 2024-11-13 DIAGNOSIS — H81.23 VESTIBULAR NEURONITIS, BILATERAL: ICD-10-CM

## 2024-11-13 DIAGNOSIS — E53.8 DEFICIENCY OF OTHER SPECIFIED B GROUP VITAMINS: ICD-10-CM

## 2024-11-13 DIAGNOSIS — R42 DIZZINESS AND GIDDINESS: ICD-10-CM

## 2024-11-13 PROCEDURE — 99496 TRANSJ CARE MGMT HIGH F2F 7D: CPT

## 2024-11-13 RX ORDER — FAMOTIDINE 40 MG/1
40 TABLET, FILM COATED ORAL
Qty: 90 | Refills: 3 | Status: ACTIVE | COMMUNITY
Start: 2024-11-13 | End: 1900-01-01

## 2024-11-14 ENCOUNTER — TRANSCRIPTION ENCOUNTER (OUTPATIENT)
Age: 74
End: 2024-11-14

## 2024-11-14 ENCOUNTER — NON-APPOINTMENT (OUTPATIENT)
Age: 74
End: 2024-11-14

## 2024-11-15 ENCOUNTER — INPATIENT (INPATIENT)
Facility: HOSPITAL | Age: 74
LOS: 7 days | Discharge: HOME HEALTH | DRG: 948 | End: 2024-11-23
Attending: STUDENT IN AN ORGANIZED HEALTH CARE EDUCATION/TRAINING PROGRAM | Admitting: STUDENT IN AN ORGANIZED HEALTH CARE EDUCATION/TRAINING PROGRAM
Payer: MEDICARE

## 2024-11-15 VITALS
DIASTOLIC BLOOD PRESSURE: 78 MMHG | RESPIRATION RATE: 18 BRPM | OXYGEN SATURATION: 100 % | TEMPERATURE: 97 F | WEIGHT: 175.05 LBS | SYSTOLIC BLOOD PRESSURE: 170 MMHG | HEART RATE: 88 BPM | HEIGHT: 71 IN

## 2024-11-15 DIAGNOSIS — Z88.8 ALLERGY STATUS TO OTHER DRUGS, MEDICAMENTS AND BIOLOGICAL SUBSTANCES: ICD-10-CM

## 2024-11-15 DIAGNOSIS — F02.811 DEMENTIA IN OTHER DISEASES CLASSIFIED ELSEWHERE, UNSPECIFIED SEVERITY, WITH AGITATION: ICD-10-CM

## 2024-11-15 DIAGNOSIS — R41.89 OTHER SYMPTOMS AND SIGNS INVOLVING COGNITIVE FUNCTIONS AND AWARENESS: ICD-10-CM

## 2024-11-15 DIAGNOSIS — F45.1 UNDIFFERENTIATED SOMATOFORM DISORDER: ICD-10-CM

## 2024-11-15 DIAGNOSIS — R41.0 DISORIENTATION, UNSPECIFIED: ICD-10-CM

## 2024-11-15 DIAGNOSIS — N39.0 URINARY TRACT INFECTION, SITE NOT SPECIFIED: ICD-10-CM

## 2024-11-15 DIAGNOSIS — Z90.79 ACQUIRED ABSENCE OF OTHER GENITAL ORGAN(S): Chronic | ICD-10-CM

## 2024-11-15 DIAGNOSIS — Z98.1 ARTHRODESIS STATUS: Chronic | ICD-10-CM

## 2024-11-15 DIAGNOSIS — I05.0 RHEUMATIC MITRAL STENOSIS: ICD-10-CM

## 2024-11-15 DIAGNOSIS — K21.9 GASTRO-ESOPHAGEAL REFLUX DISEASE WITHOUT ESOPHAGITIS: ICD-10-CM

## 2024-11-15 DIAGNOSIS — E78.5 HYPERLIPIDEMIA, UNSPECIFIED: ICD-10-CM

## 2024-11-15 DIAGNOSIS — Z88.0 ALLERGY STATUS TO PENICILLIN: ICD-10-CM

## 2024-11-15 DIAGNOSIS — H40.9 UNSPECIFIED GLAUCOMA: ICD-10-CM

## 2024-11-15 DIAGNOSIS — F06.4 ANXIETY DISORDER DUE TO KNOWN PHYSIOLOGICAL CONDITION: ICD-10-CM

## 2024-11-15 DIAGNOSIS — F05 DELIRIUM DUE TO KNOWN PHYSIOLOGICAL CONDITION: ICD-10-CM

## 2024-11-15 DIAGNOSIS — R33.8 OTHER RETENTION OF URINE: ICD-10-CM

## 2024-11-15 DIAGNOSIS — Z98.890 OTHER SPECIFIED POSTPROCEDURAL STATES: Chronic | ICD-10-CM

## 2024-11-15 DIAGNOSIS — E03.9 HYPOTHYROIDISM, UNSPECIFIED: ICD-10-CM

## 2024-11-15 DIAGNOSIS — F02.80 DEMENTIA IN OTHER DISEASES CLASSIFIED ELSEWHERE, UNSPECIFIED SEVERITY, WITHOUT BEHAVIORAL DISTURBANCE, PSYCHOTIC DISTURBANCE, MOOD DISTURBANCE, AND ANXIETY: ICD-10-CM

## 2024-11-15 DIAGNOSIS — Z41.9 ENCOUNTER FOR PROCEDURE FOR PURPOSES OTHER THAN REMEDYING HEALTH STATE, UNSPECIFIED: Chronic | ICD-10-CM

## 2024-11-15 DIAGNOSIS — K58.9 IRRITABLE BOWEL SYNDROME, UNSPECIFIED: ICD-10-CM

## 2024-11-15 DIAGNOSIS — Z92.241 PERSONAL HISTORY OF SYSTEMIC STEROID THERAPY: ICD-10-CM

## 2024-11-15 DIAGNOSIS — N40.1 BENIGN PROSTATIC HYPERPLASIA WITH LOWER URINARY TRACT SYMPTOMS: ICD-10-CM

## 2024-11-15 DIAGNOSIS — I44.0 ATRIOVENTRICULAR BLOCK, FIRST DEGREE: ICD-10-CM

## 2024-11-15 DIAGNOSIS — Z88.5 ALLERGY STATUS TO NARCOTIC AGENT: ICD-10-CM

## 2024-11-15 DIAGNOSIS — I49.3 VENTRICULAR PREMATURE DEPOLARIZATION: ICD-10-CM

## 2024-11-15 DIAGNOSIS — Z88.4 ALLERGY STATUS TO ANESTHETIC AGENT: ICD-10-CM

## 2024-11-15 DIAGNOSIS — Z88.1 ALLERGY STATUS TO OTHER ANTIBIOTIC AGENTS: ICD-10-CM

## 2024-11-15 LAB
ALBUMIN SERPL ELPH-MCNC: 3.7 G/DL — SIGNIFICANT CHANGE UP (ref 3.3–5)
ALP SERPL-CCNC: 59 U/L — SIGNIFICANT CHANGE UP (ref 40–120)
ALT FLD-CCNC: 29 U/L — SIGNIFICANT CHANGE UP (ref 12–78)
AMMONIA BLD-MCNC: 17 UMOL/L — SIGNIFICANT CHANGE UP (ref 11–32)
AMPHET UR-MCNC: NEGATIVE — SIGNIFICANT CHANGE UP
ANION GAP SERPL CALC-SCNC: 6 MMOL/L — SIGNIFICANT CHANGE UP (ref 5–17)
APAP SERPL-MCNC: < 2 UG/ML (ref 10–30)
APPEARANCE UR: CLEAR — SIGNIFICANT CHANGE UP
AST SERPL-CCNC: 43 U/L — HIGH (ref 15–37)
BARBITURATES UR SCN-MCNC: NEGATIVE — SIGNIFICANT CHANGE UP
BASE EXCESS BLDV CALC-SCNC: 0.1 MMOL/L — SIGNIFICANT CHANGE UP (ref -2–3)
BASOPHILS # BLD AUTO: 0.11 K/UL — SIGNIFICANT CHANGE UP (ref 0–0.2)
BASOPHILS NFR BLD AUTO: 1.2 % — SIGNIFICANT CHANGE UP (ref 0–2)
BENZODIAZ UR-MCNC: NEGATIVE — SIGNIFICANT CHANGE UP
BILIRUB SERPL-MCNC: 1 MG/DL — SIGNIFICANT CHANGE UP (ref 0.2–1.2)
BILIRUB UR-MCNC: NEGATIVE — SIGNIFICANT CHANGE UP
BUN SERPL-MCNC: 13 MG/DL — SIGNIFICANT CHANGE UP (ref 7–23)
CALCIUM SERPL-MCNC: 9.7 MG/DL — SIGNIFICANT CHANGE UP (ref 8.5–10.1)
CHLORIDE SERPL-SCNC: 109 MMOL/L — HIGH (ref 96–108)
CO2 SERPL-SCNC: 23 MMOL/L — SIGNIFICANT CHANGE UP (ref 22–31)
COCAINE METAB.OTHER UR-MCNC: NEGATIVE — SIGNIFICANT CHANGE UP
COLOR SPEC: YELLOW — SIGNIFICANT CHANGE UP
CREAT SERPL-MCNC: 0.99 MG/DL — SIGNIFICANT CHANGE UP (ref 0.5–1.3)
DIFF PNL FLD: NEGATIVE — SIGNIFICANT CHANGE UP
EGFR: 80 ML/MIN/1.73M2 — SIGNIFICANT CHANGE UP
EGFR: 80 ML/MIN/1.73M2 — SIGNIFICANT CHANGE UP
EOSINOPHIL # BLD AUTO: 0.2 K/UL — SIGNIFICANT CHANGE UP (ref 0–0.5)
EOSINOPHIL NFR BLD AUTO: 2.2 % — SIGNIFICANT CHANGE UP (ref 0–6)
ETHANOL SERPL-MCNC: <10 MG/DL — SIGNIFICANT CHANGE UP (ref 0–10)
FENTANYL UR QL SCN: NEGATIVE — SIGNIFICANT CHANGE UP
FLUAV AG NPH QL: SIGNIFICANT CHANGE UP
FLUBV AG NPH QL: SIGNIFICANT CHANGE UP
GAS PNL BLDV: SIGNIFICANT CHANGE UP
GLUCOSE SERPL-MCNC: 91 MG/DL — SIGNIFICANT CHANGE UP (ref 70–99)
GLUCOSE UR QL: NEGATIVE MG/DL — SIGNIFICANT CHANGE UP
HCO3 BLDV-SCNC: 24 MMOL/L — SIGNIFICANT CHANGE UP (ref 22–29)
HCT VFR BLD CALC: 41.3 % — SIGNIFICANT CHANGE UP (ref 39–50)
HGB BLD-MCNC: 14 G/DL — SIGNIFICANT CHANGE UP (ref 13–17)
IMM GRANULOCYTES NFR BLD AUTO: 0.4 % — SIGNIFICANT CHANGE UP (ref 0–0.9)
KETONES UR-MCNC: 15 MG/DL
LACTATE SERPL-SCNC: 1.7 MMOL/L — SIGNIFICANT CHANGE UP (ref 0.7–2)
LACTATE SERPL-SCNC: 2.4 MMOL/L — HIGH (ref 0.7–2)
LEUKOCYTE ESTERASE UR-ACNC: NEGATIVE — SIGNIFICANT CHANGE UP
LYMPHOCYTES # BLD AUTO: 1.07 K/UL — SIGNIFICANT CHANGE UP (ref 1–3.3)
LYMPHOCYTES # BLD AUTO: 11.8 % — LOW (ref 13–44)
MCHC RBC-ENTMCNC: 30.7 PG — SIGNIFICANT CHANGE UP (ref 27–34)
MCHC RBC-ENTMCNC: 33.9 G/DL — SIGNIFICANT CHANGE UP (ref 32–36)
MCV RBC AUTO: 90.6 FL — SIGNIFICANT CHANGE UP (ref 80–100)
METHADONE UR-MCNC: NEGATIVE — SIGNIFICANT CHANGE UP
MONOCYTES # BLD AUTO: 0.86 K/UL — SIGNIFICANT CHANGE UP (ref 0–0.9)
MONOCYTES NFR BLD AUTO: 9.5 % — SIGNIFICANT CHANGE UP (ref 2–14)
NEUTROPHILS # BLD AUTO: 6.79 K/UL — SIGNIFICANT CHANGE UP (ref 1.8–7.4)
NEUTROPHILS NFR BLD AUTO: 74.9 % — SIGNIFICANT CHANGE UP (ref 43–77)
NITRITE UR-MCNC: NEGATIVE — SIGNIFICANT CHANGE UP
OPIATES UR-MCNC: NEGATIVE — SIGNIFICANT CHANGE UP
PCO2 BLDV: 34 MMHG — LOW (ref 42–55)
PCP SPEC-MCNC: SIGNIFICANT CHANGE UP
PCP UR-MCNC: NEGATIVE — SIGNIFICANT CHANGE UP
PH BLDV: 7.45 — HIGH (ref 7.32–7.43)
PH UR: 6 — SIGNIFICANT CHANGE UP (ref 5–8)
PLATELET # BLD AUTO: 194 K/UL — SIGNIFICANT CHANGE UP (ref 150–400)
PO2 BLDV: 62 MMHG — HIGH (ref 25–45)
POTASSIUM SERPL-MCNC: 3.6 MMOL/L — SIGNIFICANT CHANGE UP (ref 3.5–5.3)
POTASSIUM SERPL-SCNC: 3.6 MMOL/L — SIGNIFICANT CHANGE UP (ref 3.5–5.3)
PROT SERPL-MCNC: 6.8 GM/DL — SIGNIFICANT CHANGE UP (ref 6–8.3)
PROT UR-MCNC: NEGATIVE MG/DL — SIGNIFICANT CHANGE UP
RBC # BLD: 4.56 M/UL — SIGNIFICANT CHANGE UP (ref 4.2–5.8)
RBC # FLD: 13.2 % — SIGNIFICANT CHANGE UP (ref 10.3–14.5)
RSV RNA NPH QL NAA+NON-PROBE: SIGNIFICANT CHANGE UP
SALICYLATES SERPL-MCNC: <1.7 MG/DL — LOW (ref 2.8–20)
SAO2 % BLDV: 93 % — HIGH (ref 67–88)
SARS-COV-2 RNA SPEC QL NAA+PROBE: SIGNIFICANT CHANGE UP
SODIUM SERPL-SCNC: 138 MMOL/L — SIGNIFICANT CHANGE UP (ref 135–145)
SP GR SPEC: 1.01 — SIGNIFICANT CHANGE UP (ref 1–1.03)
THC UR QL: NEGATIVE — SIGNIFICANT CHANGE UP
TSH SERPL-MCNC: 1.53 UU/ML — SIGNIFICANT CHANGE UP (ref 0.34–4.82)
UROBILINOGEN FLD QL: 1 MG/DL — SIGNIFICANT CHANGE UP (ref 0.2–1)
WBC # BLD: 9.07 K/UL — SIGNIFICANT CHANGE UP (ref 3.8–10.5)
WBC # FLD AUTO: 9.07 K/UL — SIGNIFICANT CHANGE UP (ref 3.8–10.5)

## 2024-11-15 PROCEDURE — 81001 URINALYSIS AUTO W/SCOPE: CPT

## 2024-11-15 PROCEDURE — 80048 BASIC METABOLIC PNL TOTAL CA: CPT

## 2024-11-15 PROCEDURE — 70553 MRI BRAIN STEM W/O & W/DYE: CPT | Mod: MC

## 2024-11-15 PROCEDURE — 95816 EEG AWAKE AND DROWSY: CPT

## 2024-11-15 PROCEDURE — 97162 PT EVAL MOD COMPLEX 30 MIN: CPT | Mod: GP

## 2024-11-15 PROCEDURE — 85027 COMPLETE CBC AUTOMATED: CPT

## 2024-11-15 PROCEDURE — A9579: CPT

## 2024-11-15 PROCEDURE — 71045 X-RAY EXAM CHEST 1 VIEW: CPT | Mod: 26

## 2024-11-15 PROCEDURE — 82607 VITAMIN B-12: CPT

## 2024-11-15 PROCEDURE — 97116 GAIT TRAINING THERAPY: CPT | Mod: GP

## 2024-11-15 PROCEDURE — 36415 COLL VENOUS BLD VENIPUNCTURE: CPT

## 2024-11-15 PROCEDURE — 87086 URINE CULTURE/COLONY COUNT: CPT

## 2024-11-15 PROCEDURE — 70450 CT HEAD/BRAIN W/O DYE: CPT | Mod: 26,MC

## 2024-11-15 PROCEDURE — 82306 VITAMIN D 25 HYDROXY: CPT

## 2024-11-15 PROCEDURE — 76770 US EXAM ABDO BACK WALL COMP: CPT

## 2024-11-15 PROCEDURE — 97530 THERAPEUTIC ACTIVITIES: CPT | Mod: GP

## 2024-11-15 PROCEDURE — 99222 1ST HOSP IP/OBS MODERATE 55: CPT

## 2024-11-15 PROCEDURE — 0241U: CPT

## 2024-11-15 PROCEDURE — 87077 CULTURE AEROBIC IDENTIFY: CPT

## 2024-11-15 PROCEDURE — 87186 SC STD MICRODIL/AGAR DIL: CPT

## 2024-11-15 PROCEDURE — 87040 BLOOD CULTURE FOR BACTERIA: CPT

## 2024-11-15 PROCEDURE — 99285 EMERGENCY DEPT VISIT HI MDM: CPT

## 2024-11-15 PROCEDURE — 83605 ASSAY OF LACTIC ACID: CPT

## 2024-11-15 PROCEDURE — 99223 1ST HOSP IP/OBS HIGH 75: CPT

## 2024-11-15 PROCEDURE — 84425 ASSAY OF VITAMIN B-1: CPT

## 2024-11-15 PROCEDURE — 95816 EEG AWAKE AND DROWSY: CPT | Mod: 26

## 2024-11-15 RX ORDER — LEVOTHYROXINE SODIUM 300 MCG
50 TABLET ORAL DAILY
Refills: 0 | Status: DISCONTINUED | OUTPATIENT
Start: 2024-11-15 | End: 2024-11-23

## 2024-11-15 RX ORDER — HEPARIN SODIUM 1000 [USP'U]/ML
5000 INJECTION INTRAVENOUS; SUBCUTANEOUS EVERY 8 HOURS
Refills: 0 | Status: DISCONTINUED | OUTPATIENT
Start: 2024-11-15 | End: 2024-11-23

## 2024-11-15 RX ORDER — MAGNESIUM, ALUMINUM HYDROXIDE 200-200 MG
30 TABLET,CHEWABLE ORAL EVERY 4 HOURS
Refills: 0 | Status: DISCONTINUED | OUTPATIENT
Start: 2024-11-15 | End: 2024-11-23

## 2024-11-15 RX ORDER — ROSUVASTATIN CALCIUM 20 MG/1
5 TABLET, FILM COATED ORAL AT BEDTIME
Refills: 0 | Status: DISCONTINUED | OUTPATIENT
Start: 2024-11-15 | End: 2024-11-23

## 2024-11-15 RX ORDER — MELATONIN 5 MG
3 TABLET ORAL AT BEDTIME
Refills: 0 | Status: DISCONTINUED | OUTPATIENT
Start: 2024-11-15 | End: 2024-11-23

## 2024-11-15 RX ORDER — TAMSULOSIN HYDROCHLORIDE 0.4 MG/1
0.4 CAPSULE ORAL AT BEDTIME
Refills: 0 | Status: DISCONTINUED | OUTPATIENT
Start: 2024-11-15 | End: 2024-11-19

## 2024-11-15 RX ORDER — ONDANSETRON HCL/PF 4 MG/2 ML
4 VIAL (ML) INJECTION EVERY 6 HOURS
Refills: 0 | Status: DISCONTINUED | OUTPATIENT
Start: 2024-11-15 | End: 2024-11-23

## 2024-11-15 RX ORDER — POLYETHYLENE GLYCOL 3350 17 G/17G
17 POWDER, FOR SOLUTION ORAL DAILY
Refills: 0 | Status: DISCONTINUED | OUTPATIENT
Start: 2024-11-15 | End: 2024-11-23

## 2024-11-15 RX ORDER — ALPRAZOLAM 0.5 MG
0.25 TABLET, EXTENDED RELEASE 24 HR ORAL ONCE
Refills: 0 | Status: DISCONTINUED | OUTPATIENT
Start: 2024-11-15 | End: 2024-11-15

## 2024-11-15 RX ORDER — CLONAZEPAM 0.5 MG/1
0.5 TABLET ORAL
Refills: 0 | Status: DISCONTINUED | OUTPATIENT
Start: 2024-11-15 | End: 2024-11-17

## 2024-11-15 RX ORDER — BUPROPION HYDROBROMIDE 522 MG/1
300 TABLET, EXTENDED RELEASE ORAL DAILY
Refills: 0 | Status: DISCONTINUED | OUTPATIENT
Start: 2024-11-15 | End: 2024-11-23

## 2024-11-15 RX ADMIN — TAMSULOSIN HYDROCHLORIDE 0.4 MILLIGRAM(S): 0.4 CAPSULE ORAL at 21:07

## 2024-11-15 RX ADMIN — ROSUVASTATIN CALCIUM 5 MILLIGRAM(S): 20 TABLET, FILM COATED ORAL at 21:15

## 2024-11-15 RX ADMIN — HEPARIN SODIUM 5000 UNIT(S): 1000 INJECTION INTRAVENOUS; SUBCUTANEOUS at 17:58

## 2024-11-15 RX ADMIN — Medication 3 MILLIGRAM(S): at 21:16

## 2024-11-15 RX ADMIN — CLONAZEPAM 0.5 MILLIGRAM(S): 0.5 TABLET ORAL at 21:07

## 2024-11-15 RX ADMIN — HEPARIN SODIUM 5000 UNIT(S): 1000 INJECTION INTRAVENOUS; SUBCUTANEOUS at 21:08

## 2024-11-15 RX ADMIN — POLYETHYLENE GLYCOL 3350 17 GRAM(S): 17 POWDER, FOR SOLUTION ORAL at 17:59

## 2024-11-15 RX ADMIN — CLONAZEPAM 0.5 MILLIGRAM(S): 0.5 TABLET ORAL at 12:22

## 2024-11-15 NOTE — PROGRESS NOTE ADULT - GASTROINTESTINAL DETAILS
no distention/soft/nontender/bowel sounds normal
soft/nontender/bowel sounds normal/no distention
no distention/nontender/soft/bowel sounds normal
no

## 2024-11-16 LAB
24R-OH-CALCIDIOL SERPL-MCNC: 47 NG/ML — SIGNIFICANT CHANGE UP (ref 30–80)
ANION GAP SERPL CALC-SCNC: 3 MMOL/L — LOW (ref 5–17)
BUN SERPL-MCNC: 13 MG/DL — SIGNIFICANT CHANGE UP (ref 7–23)
CALCIUM SERPL-MCNC: 9.3 MG/DL — SIGNIFICANT CHANGE UP (ref 8.5–10.1)
CHLORIDE SERPL-SCNC: 108 MMOL/L — SIGNIFICANT CHANGE UP (ref 96–108)
CO2 SERPL-SCNC: 29 MMOL/L — SIGNIFICANT CHANGE UP (ref 22–31)
CREAT SERPL-MCNC: 1.04 MG/DL — SIGNIFICANT CHANGE UP (ref 0.5–1.3)
EGFR: 76 ML/MIN/1.73M2 — SIGNIFICANT CHANGE UP
EGFR: 76 ML/MIN/1.73M2 — SIGNIFICANT CHANGE UP
GLUCOSE SERPL-MCNC: 98 MG/DL — SIGNIFICANT CHANGE UP (ref 70–99)
HCT VFR BLD CALC: 39.4 % — SIGNIFICANT CHANGE UP (ref 39–50)
HGB BLD-MCNC: 13.1 G/DL — SIGNIFICANT CHANGE UP (ref 13–17)
MCHC RBC-ENTMCNC: 30.8 PG — SIGNIFICANT CHANGE UP (ref 27–34)
MCHC RBC-ENTMCNC: 33.2 G/DL — SIGNIFICANT CHANGE UP (ref 32–36)
MCV RBC AUTO: 92.7 FL — SIGNIFICANT CHANGE UP (ref 80–100)
PLATELET # BLD AUTO: 189 K/UL — SIGNIFICANT CHANGE UP (ref 150–400)
POTASSIUM SERPL-MCNC: 4.3 MMOL/L — SIGNIFICANT CHANGE UP (ref 3.5–5.3)
POTASSIUM SERPL-SCNC: 4.3 MMOL/L — SIGNIFICANT CHANGE UP (ref 3.5–5.3)
RBC # BLD: 4.25 M/UL — SIGNIFICANT CHANGE UP (ref 4.2–5.8)
RBC # FLD: 13.7 % — SIGNIFICANT CHANGE UP (ref 10.3–14.5)
SODIUM SERPL-SCNC: 140 MMOL/L — SIGNIFICANT CHANGE UP (ref 135–145)
VIT B12 SERPL-MCNC: 448 PG/ML — SIGNIFICANT CHANGE UP (ref 232–1245)
WBC # BLD: 7.04 K/UL — SIGNIFICANT CHANGE UP (ref 3.8–10.5)
WBC # FLD AUTO: 7.04 K/UL — SIGNIFICANT CHANGE UP (ref 3.8–10.5)

## 2024-11-16 PROCEDURE — 99233 SBSQ HOSP IP/OBS HIGH 50: CPT

## 2024-11-16 RX ADMIN — Medication 50 MILLILITER(S): at 00:19

## 2024-11-16 RX ADMIN — HEPARIN SODIUM 5000 UNIT(S): 1000 INJECTION INTRAVENOUS; SUBCUTANEOUS at 15:08

## 2024-11-16 RX ADMIN — Medication 2000 UNIT(S): at 11:33

## 2024-11-16 RX ADMIN — HEPARIN SODIUM 5000 UNIT(S): 1000 INJECTION INTRAVENOUS; SUBCUTANEOUS at 22:23

## 2024-11-16 RX ADMIN — BUPROPION HYDROBROMIDE 300 MILLIGRAM(S): 522 TABLET, EXTENDED RELEASE ORAL at 11:32

## 2024-11-16 RX ADMIN — CLONAZEPAM 0.5 MILLIGRAM(S): 0.5 TABLET ORAL at 22:23

## 2024-11-16 RX ADMIN — Medication 50 MICROGRAM(S): at 05:50

## 2024-11-16 RX ADMIN — ROSUVASTATIN CALCIUM 5 MILLIGRAM(S): 20 TABLET, FILM COATED ORAL at 22:23

## 2024-11-16 RX ADMIN — TAMSULOSIN HYDROCHLORIDE 0.4 MILLIGRAM(S): 0.4 CAPSULE ORAL at 22:23

## 2024-11-16 RX ADMIN — HEPARIN SODIUM 5000 UNIT(S): 1000 INJECTION INTRAVENOUS; SUBCUTANEOUS at 05:50

## 2024-11-16 RX ADMIN — Medication 40 MILLIGRAM(S): at 05:50

## 2024-11-17 PROCEDURE — 99232 SBSQ HOSP IP/OBS MODERATE 35: CPT

## 2024-11-17 RX ORDER — CLONAZEPAM 0.5 MG/1
0.5 TABLET ORAL
Refills: 0 | Status: DISCONTINUED | OUTPATIENT
Start: 2024-11-17 | End: 2024-11-23

## 2024-11-17 RX ADMIN — Medication 2000 UNIT(S): at 10:14

## 2024-11-17 RX ADMIN — Medication 30 MILLILITER(S): at 16:23

## 2024-11-17 RX ADMIN — CLONAZEPAM 0.5 MILLIGRAM(S): 0.5 TABLET ORAL at 22:31

## 2024-11-17 RX ADMIN — BUPROPION HYDROBROMIDE 300 MILLIGRAM(S): 522 TABLET, EXTENDED RELEASE ORAL at 10:14

## 2024-11-17 RX ADMIN — Medication 40 MILLIGRAM(S): at 22:31

## 2024-11-17 RX ADMIN — POLYETHYLENE GLYCOL 3350 17 GRAM(S): 17 POWDER, FOR SOLUTION ORAL at 22:31

## 2024-11-17 RX ADMIN — HEPARIN SODIUM 5000 UNIT(S): 1000 INJECTION INTRAVENOUS; SUBCUTANEOUS at 13:20

## 2024-11-17 RX ADMIN — TAMSULOSIN HYDROCHLORIDE 0.4 MILLIGRAM(S): 0.4 CAPSULE ORAL at 22:31

## 2024-11-17 RX ADMIN — Medication 40 MILLIGRAM(S): at 06:00

## 2024-11-17 RX ADMIN — HEPARIN SODIUM 5000 UNIT(S): 1000 INJECTION INTRAVENOUS; SUBCUTANEOUS at 06:00

## 2024-11-17 RX ADMIN — ROSUVASTATIN CALCIUM 5 MILLIGRAM(S): 20 TABLET, FILM COATED ORAL at 22:31

## 2024-11-17 RX ADMIN — HEPARIN SODIUM 5000 UNIT(S): 1000 INJECTION INTRAVENOUS; SUBCUTANEOUS at 22:32

## 2024-11-17 RX ADMIN — Medication 50 MICROGRAM(S): at 06:00

## 2024-11-17 RX ADMIN — CLONAZEPAM 0.5 MILLIGRAM(S): 0.5 TABLET ORAL at 10:12

## 2024-11-17 RX ADMIN — Medication 3 MILLIGRAM(S): at 22:32

## 2024-11-18 DIAGNOSIS — Z88.5 ALLERGY STATUS TO NARCOTIC AGENT: ICD-10-CM

## 2024-11-18 DIAGNOSIS — I47.10 SUPRAVENTRICULAR TACHYCARDIA, UNSPECIFIED: ICD-10-CM

## 2024-11-18 DIAGNOSIS — Z88.8 ALLERGY STATUS TO OTHER DRUGS, MEDICAMENTS AND BIOLOGICAL SUBSTANCES: ICD-10-CM

## 2024-11-18 DIAGNOSIS — R26.81 UNSTEADINESS ON FEET: ICD-10-CM

## 2024-11-18 DIAGNOSIS — K21.9 GASTRO-ESOPHAGEAL REFLUX DISEASE WITHOUT ESOPHAGITIS: ICD-10-CM

## 2024-11-18 DIAGNOSIS — J45.909 UNSPECIFIED ASTHMA, UNCOMPLICATED: ICD-10-CM

## 2024-11-18 DIAGNOSIS — Z88.1 ALLERGY STATUS TO OTHER ANTIBIOTIC AGENTS: ICD-10-CM

## 2024-11-18 DIAGNOSIS — Z74.01 BED CONFINEMENT STATUS: ICD-10-CM

## 2024-11-18 DIAGNOSIS — E03.9 HYPOTHYROIDISM, UNSPECIFIED: ICD-10-CM

## 2024-11-18 DIAGNOSIS — Z88.0 ALLERGY STATUS TO PENICILLIN: ICD-10-CM

## 2024-11-18 DIAGNOSIS — H81.399 OTHER PERIPHERAL VERTIGO, UNSPECIFIED EAR: ICD-10-CM

## 2024-11-18 PROCEDURE — 90792 PSYCH DIAG EVAL W/MED SRVCS: CPT

## 2024-11-18 PROCEDURE — 99232 SBSQ HOSP IP/OBS MODERATE 35: CPT

## 2024-11-18 PROCEDURE — 99233 SBSQ HOSP IP/OBS HIGH 50: CPT

## 2024-11-18 PROCEDURE — 70553 MRI BRAIN STEM W/O & W/DYE: CPT | Mod: 26

## 2024-11-18 RX ORDER — HYDROCORTISONE 10 MG/G
1 CREAM TOPICAL ONCE
Refills: 0 | Status: COMPLETED | OUTPATIENT
Start: 2024-11-18 | End: 2024-11-18

## 2024-11-18 RX ORDER — ACETAMINOPHEN 500 MG/5ML
650 LIQUID (ML) ORAL ONCE
Refills: 0 | Status: COMPLETED | OUTPATIENT
Start: 2024-11-18 | End: 2024-11-18

## 2024-11-18 RX ADMIN — Medication 40 MILLIGRAM(S): at 22:01

## 2024-11-18 RX ADMIN — Medication 3 MILLIGRAM(S): at 22:02

## 2024-11-18 RX ADMIN — BUPROPION HYDROBROMIDE 300 MILLIGRAM(S): 522 TABLET, EXTENDED RELEASE ORAL at 10:19

## 2024-11-18 RX ADMIN — Medication 2000 UNIT(S): at 10:19

## 2024-11-18 RX ADMIN — HEPARIN SODIUM 5000 UNIT(S): 1000 INJECTION INTRAVENOUS; SUBCUTANEOUS at 22:01

## 2024-11-18 RX ADMIN — ROSUVASTATIN CALCIUM 5 MILLIGRAM(S): 20 TABLET, FILM COATED ORAL at 22:01

## 2024-11-18 RX ADMIN — HEPARIN SODIUM 5000 UNIT(S): 1000 INJECTION INTRAVENOUS; SUBCUTANEOUS at 06:19

## 2024-11-18 RX ADMIN — POLYETHYLENE GLYCOL 3350 17 GRAM(S): 17 POWDER, FOR SOLUTION ORAL at 10:27

## 2024-11-18 RX ADMIN — Medication 50 MICROGRAM(S): at 06:19

## 2024-11-18 RX ADMIN — HEPARIN SODIUM 5000 UNIT(S): 1000 INJECTION INTRAVENOUS; SUBCUTANEOUS at 15:31

## 2024-11-18 RX ADMIN — Medication 650 MILLIGRAM(S): at 22:23

## 2024-11-18 RX ADMIN — Medication 40 MILLIGRAM(S): at 06:19

## 2024-11-18 RX ADMIN — CLONAZEPAM 0.5 MILLIGRAM(S): 0.5 TABLET ORAL at 22:02

## 2024-11-18 RX ADMIN — HYDROCORTISONE 1 SUPPOSITORY(S): 10 CREAM TOPICAL at 20:12

## 2024-11-18 RX ADMIN — TAMSULOSIN HYDROCHLORIDE 0.4 MILLIGRAM(S): 0.4 CAPSULE ORAL at 22:02

## 2024-11-19 LAB
ADD ON TEST-SPECIMEN IN LAB: SIGNIFICANT CHANGE UP
ANION GAP SERPL CALC-SCNC: 3 MMOL/L — LOW (ref 5–17)
APPEARANCE UR: ABNORMAL
APPEARANCE UR: CLEAR — SIGNIFICANT CHANGE UP
BACTERIA # UR AUTO: ABNORMAL /HPF
BACTERIA # UR AUTO: ABNORMAL /HPF
BILIRUB UR-MCNC: NEGATIVE — SIGNIFICANT CHANGE UP
BILIRUB UR-MCNC: NEGATIVE — SIGNIFICANT CHANGE UP
BUN SERPL-MCNC: 14 MG/DL — SIGNIFICANT CHANGE UP (ref 7–23)
CALCIUM SERPL-MCNC: 9.2 MG/DL — SIGNIFICANT CHANGE UP (ref 8.5–10.1)
CAST: 0 /LPF — SIGNIFICANT CHANGE UP (ref 0–4)
CAST: 1 /LPF — SIGNIFICANT CHANGE UP (ref 0–4)
CHLORIDE SERPL-SCNC: 106 MMOL/L — SIGNIFICANT CHANGE UP (ref 96–108)
CO2 SERPL-SCNC: 27 MMOL/L — SIGNIFICANT CHANGE UP (ref 22–31)
COLOR SPEC: YELLOW — SIGNIFICANT CHANGE UP
COLOR SPEC: YELLOW — SIGNIFICANT CHANGE UP
CREAT SERPL-MCNC: 0.88 MG/DL — SIGNIFICANT CHANGE UP (ref 0.5–1.3)
DIFF PNL FLD: ABNORMAL
DIFF PNL FLD: NEGATIVE — SIGNIFICANT CHANGE UP
EGFR: 91 ML/MIN/1.73M2 — SIGNIFICANT CHANGE UP
EGFR: 91 ML/MIN/1.73M2 — SIGNIFICANT CHANGE UP
FLUAV AG NPH QL: SIGNIFICANT CHANGE UP
FLUBV AG NPH QL: SIGNIFICANT CHANGE UP
GLUCOSE SERPL-MCNC: 94 MG/DL — SIGNIFICANT CHANGE UP (ref 70–99)
GLUCOSE UR QL: NEGATIVE MG/DL — SIGNIFICANT CHANGE UP
GLUCOSE UR QL: NEGATIVE MG/DL — SIGNIFICANT CHANGE UP
HCT VFR BLD CALC: 39.6 % — SIGNIFICANT CHANGE UP (ref 39–50)
HGB BLD-MCNC: 13.3 G/DL — SIGNIFICANT CHANGE UP (ref 13–17)
KETONES UR-MCNC: ABNORMAL MG/DL
KETONES UR-MCNC: NEGATIVE MG/DL — SIGNIFICANT CHANGE UP
LACTATE SERPL-SCNC: 1.5 MMOL/L — SIGNIFICANT CHANGE UP (ref 0.7–2)
LEUKOCYTE ESTERASE UR-ACNC: ABNORMAL
LEUKOCYTE ESTERASE UR-ACNC: ABNORMAL
MCHC RBC-ENTMCNC: 31.4 PG — SIGNIFICANT CHANGE UP (ref 27–34)
MCHC RBC-ENTMCNC: 33.6 G/DL — SIGNIFICANT CHANGE UP (ref 32–36)
MCV RBC AUTO: 93.4 FL — SIGNIFICANT CHANGE UP (ref 80–100)
NITRITE UR-MCNC: POSITIVE
NITRITE UR-MCNC: POSITIVE
PH UR: 5.5 — SIGNIFICANT CHANGE UP (ref 5–8)
PH UR: 6 — SIGNIFICANT CHANGE UP (ref 5–8)
PLATELET # BLD AUTO: 159 K/UL — SIGNIFICANT CHANGE UP (ref 150–400)
POTASSIUM SERPL-MCNC: 3.9 MMOL/L — SIGNIFICANT CHANGE UP (ref 3.5–5.3)
POTASSIUM SERPL-SCNC: 3.9 MMOL/L — SIGNIFICANT CHANGE UP (ref 3.5–5.3)
PROT UR-MCNC: NEGATIVE MG/DL — SIGNIFICANT CHANGE UP
PROT UR-MCNC: SIGNIFICANT CHANGE UP MG/DL
RBC # BLD: 4.24 M/UL — SIGNIFICANT CHANGE UP (ref 4.2–5.8)
RBC # FLD: 13.6 % — SIGNIFICANT CHANGE UP (ref 10.3–14.5)
RBC CASTS # UR COMP ASSIST: 0 /HPF — SIGNIFICANT CHANGE UP (ref 0–4)
RBC CASTS # UR COMP ASSIST: 11 /HPF — HIGH (ref 0–4)
RSV RNA NPH QL NAA+NON-PROBE: SIGNIFICANT CHANGE UP
SARS-COV-2 RNA SPEC QL NAA+PROBE: SIGNIFICANT CHANGE UP
SODIUM SERPL-SCNC: 136 MMOL/L — SIGNIFICANT CHANGE UP (ref 135–145)
SP GR SPEC: 1.01 — SIGNIFICANT CHANGE UP (ref 1–1.03)
SP GR SPEC: 1.02 — SIGNIFICANT CHANGE UP (ref 1–1.03)
SQUAMOUS # UR AUTO: 1 /HPF — SIGNIFICANT CHANGE UP (ref 0–5)
SQUAMOUS # UR AUTO: 1 /HPF — SIGNIFICANT CHANGE UP (ref 0–5)
UROBILINOGEN FLD QL: 1 MG/DL — SIGNIFICANT CHANGE UP (ref 0.2–1)
UROBILINOGEN FLD QL: 1 MG/DL — SIGNIFICANT CHANGE UP (ref 0.2–1)
WBC # BLD: 17.82 K/UL — HIGH (ref 3.8–10.5)
WBC # FLD AUTO: 17.82 K/UL — HIGH (ref 3.8–10.5)
WBC UR QL: 45 /HPF — HIGH (ref 0–5)
WBC UR QL: 82 /HPF — HIGH (ref 0–5)

## 2024-11-19 PROCEDURE — 51702 INSERT TEMP BLADDER CATH: CPT

## 2024-11-19 PROCEDURE — 99233 SBSQ HOSP IP/OBS HIGH 50: CPT

## 2024-11-19 PROCEDURE — 76770 US EXAM ABDO BACK WALL COMP: CPT | Mod: 26

## 2024-11-19 RX ORDER — SODIUM CHLORIDE 0.65 %
1 AEROSOL, SPRAY (ML) NASAL
Refills: 0 | Status: DISCONTINUED | OUTPATIENT
Start: 2024-11-19 | End: 2024-11-23

## 2024-11-19 RX ORDER — CEFTRIAXONE 500 MG/1
1000 INJECTION, POWDER, FOR SOLUTION INTRAMUSCULAR; INTRAVENOUS ONCE
Refills: 0 | Status: COMPLETED | OUTPATIENT
Start: 2024-11-19 | End: 2024-11-19

## 2024-11-19 RX ORDER — TAMSULOSIN HYDROCHLORIDE 0.4 MG/1
0.8 CAPSULE ORAL AT BEDTIME
Refills: 0 | Status: DISCONTINUED | OUTPATIENT
Start: 2024-11-19 | End: 2024-11-23

## 2024-11-19 RX ORDER — CEFTRIAXONE 500 MG/1
INJECTION, POWDER, FOR SOLUTION INTRAMUSCULAR; INTRAVENOUS
Refills: 0 | Status: DISCONTINUED | OUTPATIENT
Start: 2024-11-19 | End: 2024-11-19

## 2024-11-19 RX ORDER — CEFTRIAXONE 500 MG/1
1000 INJECTION, POWDER, FOR SOLUTION INTRAMUSCULAR; INTRAVENOUS EVERY 24 HOURS
Refills: 0 | Status: DISCONTINUED | OUTPATIENT
Start: 2024-11-20 | End: 2024-11-22

## 2024-11-19 RX ORDER — CEFTRIAXONE 500 MG/1
INJECTION, POWDER, FOR SOLUTION INTRAMUSCULAR; INTRAVENOUS
Refills: 0 | Status: DISCONTINUED | OUTPATIENT
Start: 2024-11-19 | End: 2024-11-22

## 2024-11-19 RX ADMIN — ROSUVASTATIN CALCIUM 5 MILLIGRAM(S): 20 TABLET, FILM COATED ORAL at 21:44

## 2024-11-19 RX ADMIN — BUPROPION HYDROBROMIDE 300 MILLIGRAM(S): 522 TABLET, EXTENDED RELEASE ORAL at 10:12

## 2024-11-19 RX ADMIN — Medication 650 MILLIGRAM(S): at 00:30

## 2024-11-19 RX ADMIN — HEPARIN SODIUM 5000 UNIT(S): 1000 INJECTION INTRAVENOUS; SUBCUTANEOUS at 06:31

## 2024-11-19 RX ADMIN — Medication 1 SPRAY(S): at 21:44

## 2024-11-19 RX ADMIN — Medication 40 MILLIGRAM(S): at 06:31

## 2024-11-19 RX ADMIN — Medication 40 MILLIGRAM(S): at 21:44

## 2024-11-19 RX ADMIN — CEFTRIAXONE 1000 MILLIGRAM(S): 500 INJECTION, POWDER, FOR SOLUTION INTRAMUSCULAR; INTRAVENOUS at 12:22

## 2024-11-19 RX ADMIN — HEPARIN SODIUM 5000 UNIT(S): 1000 INJECTION INTRAVENOUS; SUBCUTANEOUS at 14:13

## 2024-11-19 RX ADMIN — Medication 2000 UNIT(S): at 10:11

## 2024-11-19 RX ADMIN — HEPARIN SODIUM 5000 UNIT(S): 1000 INJECTION INTRAVENOUS; SUBCUTANEOUS at 21:44

## 2024-11-19 RX ADMIN — Medication 50 MICROGRAM(S): at 06:31

## 2024-11-20 LAB
CULTURE RESULTS: SIGNIFICANT CHANGE UP
CULTURE RESULTS: SIGNIFICANT CHANGE UP
SPECIMEN SOURCE: SIGNIFICANT CHANGE UP
SPECIMEN SOURCE: SIGNIFICANT CHANGE UP
VIT B1 SERPL-MCNC: 67.4 NMOL/L — SIGNIFICANT CHANGE UP (ref 66.5–200)

## 2024-11-20 PROCEDURE — 99233 SBSQ HOSP IP/OBS HIGH 50: CPT

## 2024-11-20 RX ORDER — LANOLIN/MINERAL OIL/PETROLATUM
1 OINTMENT (GRAM) OPHTHALMIC (EYE)
Refills: 0 | Status: DISCONTINUED | OUTPATIENT
Start: 2024-11-20 | End: 2024-11-23

## 2024-11-20 RX ADMIN — HEPARIN SODIUM 5000 UNIT(S): 1000 INJECTION INTRAVENOUS; SUBCUTANEOUS at 05:22

## 2024-11-20 RX ADMIN — TAMSULOSIN HYDROCHLORIDE 0.8 MILLIGRAM(S): 0.4 CAPSULE ORAL at 21:14

## 2024-11-20 RX ADMIN — Medication 1 SPRAY(S): at 05:21

## 2024-11-20 RX ADMIN — Medication 1 DROP(S): at 05:22

## 2024-11-20 RX ADMIN — Medication 50 MICROGRAM(S): at 05:22

## 2024-11-20 RX ADMIN — Medication 40 MILLIGRAM(S): at 21:15

## 2024-11-20 RX ADMIN — Medication 1 DROP(S): at 21:30

## 2024-11-20 RX ADMIN — Medication 2000 UNIT(S): at 09:00

## 2024-11-20 RX ADMIN — BUPROPION HYDROBROMIDE 300 MILLIGRAM(S): 522 TABLET, EXTENDED RELEASE ORAL at 09:01

## 2024-11-20 RX ADMIN — Medication 3 MILLIGRAM(S): at 21:15

## 2024-11-20 RX ADMIN — ROSUVASTATIN CALCIUM 5 MILLIGRAM(S): 20 TABLET, FILM COATED ORAL at 21:15

## 2024-11-20 RX ADMIN — HEPARIN SODIUM 5000 UNIT(S): 1000 INJECTION INTRAVENOUS; SUBCUTANEOUS at 21:15

## 2024-11-20 RX ADMIN — Medication 1 SPRAY(S): at 21:15

## 2024-11-20 RX ADMIN — Medication 40 MILLIGRAM(S): at 05:21

## 2024-11-20 RX ADMIN — CEFTRIAXONE 1000 MILLIGRAM(S): 500 INJECTION, POWDER, FOR SOLUTION INTRAMUSCULAR; INTRAVENOUS at 09:01

## 2024-11-20 RX ADMIN — POLYETHYLENE GLYCOL 3350 17 GRAM(S): 17 POWDER, FOR SOLUTION ORAL at 09:14

## 2024-11-20 RX ADMIN — HEPARIN SODIUM 5000 UNIT(S): 1000 INJECTION INTRAVENOUS; SUBCUTANEOUS at 13:02

## 2024-11-21 LAB
ANION GAP SERPL CALC-SCNC: 2 MMOL/L — LOW (ref 5–17)
BUN SERPL-MCNC: 14 MG/DL — SIGNIFICANT CHANGE UP (ref 7–23)
CALCIUM SERPL-MCNC: 9.5 MG/DL — SIGNIFICANT CHANGE UP (ref 8.5–10.1)
CHLORIDE SERPL-SCNC: 107 MMOL/L — SIGNIFICANT CHANGE UP (ref 96–108)
CO2 SERPL-SCNC: 27 MMOL/L — SIGNIFICANT CHANGE UP (ref 22–31)
CREAT SERPL-MCNC: 0.87 MG/DL — SIGNIFICANT CHANGE UP (ref 0.5–1.3)
EGFR: 91 ML/MIN/1.73M2 — SIGNIFICANT CHANGE UP
EGFR: 91 ML/MIN/1.73M2 — SIGNIFICANT CHANGE UP
GLUCOSE SERPL-MCNC: 101 MG/DL — HIGH (ref 70–99)
HCT VFR BLD CALC: 41 % — SIGNIFICANT CHANGE UP (ref 39–50)
HGB BLD-MCNC: 13.7 G/DL — SIGNIFICANT CHANGE UP (ref 13–17)
MCHC RBC-ENTMCNC: 30.9 PG — SIGNIFICANT CHANGE UP (ref 27–34)
MCHC RBC-ENTMCNC: 33.4 G/DL — SIGNIFICANT CHANGE UP (ref 32–36)
MCV RBC AUTO: 92.3 FL — SIGNIFICANT CHANGE UP (ref 80–100)
PLATELET # BLD AUTO: 215 K/UL — SIGNIFICANT CHANGE UP (ref 150–400)
POTASSIUM SERPL-MCNC: 3.9 MMOL/L — SIGNIFICANT CHANGE UP (ref 3.5–5.3)
POTASSIUM SERPL-SCNC: 3.9 MMOL/L — SIGNIFICANT CHANGE UP (ref 3.5–5.3)
RBC # BLD: 4.44 M/UL — SIGNIFICANT CHANGE UP (ref 4.2–5.8)
RBC # FLD: 13.3 % — SIGNIFICANT CHANGE UP (ref 10.3–14.5)
SODIUM SERPL-SCNC: 136 MMOL/L — SIGNIFICANT CHANGE UP (ref 135–145)
WBC # BLD: 6.46 K/UL — SIGNIFICANT CHANGE UP (ref 3.8–10.5)
WBC # FLD AUTO: 6.46 K/UL — SIGNIFICANT CHANGE UP (ref 3.8–10.5)

## 2024-11-21 PROCEDURE — 99232 SBSQ HOSP IP/OBS MODERATE 35: CPT

## 2024-11-21 RX ADMIN — HEPARIN SODIUM 5000 UNIT(S): 1000 INJECTION INTRAVENOUS; SUBCUTANEOUS at 22:48

## 2024-11-21 RX ADMIN — BUPROPION HYDROBROMIDE 300 MILLIGRAM(S): 522 TABLET, EXTENDED RELEASE ORAL at 10:30

## 2024-11-21 RX ADMIN — CLONAZEPAM 0.5 MILLIGRAM(S): 0.5 TABLET ORAL at 22:47

## 2024-11-21 RX ADMIN — Medication 40 MILLIGRAM(S): at 22:47

## 2024-11-21 RX ADMIN — Medication 2000 UNIT(S): at 10:30

## 2024-11-21 RX ADMIN — ROSUVASTATIN CALCIUM 5 MILLIGRAM(S): 20 TABLET, FILM COATED ORAL at 22:48

## 2024-11-21 RX ADMIN — HEPARIN SODIUM 5000 UNIT(S): 1000 INJECTION INTRAVENOUS; SUBCUTANEOUS at 06:49

## 2024-11-21 RX ADMIN — POLYETHYLENE GLYCOL 3350 17 GRAM(S): 17 POWDER, FOR SOLUTION ORAL at 10:31

## 2024-11-21 RX ADMIN — CLONAZEPAM 0.5 MILLIGRAM(S): 0.5 TABLET ORAL at 20:48

## 2024-11-21 RX ADMIN — TAMSULOSIN HYDROCHLORIDE 0.8 MILLIGRAM(S): 0.4 CAPSULE ORAL at 22:47

## 2024-11-21 RX ADMIN — Medication 50 MICROGRAM(S): at 06:49

## 2024-11-21 RX ADMIN — Medication 40 MILLIGRAM(S): at 06:49

## 2024-11-21 RX ADMIN — HEPARIN SODIUM 5000 UNIT(S): 1000 INJECTION INTRAVENOUS; SUBCUTANEOUS at 13:03

## 2024-11-21 RX ADMIN — CEFTRIAXONE 1000 MILLIGRAM(S): 500 INJECTION, POWDER, FOR SOLUTION INTRAMUSCULAR; INTRAVENOUS at 10:30

## 2024-11-22 ENCOUNTER — TRANSCRIPTION ENCOUNTER (OUTPATIENT)
Age: 74
End: 2024-11-22

## 2024-11-22 LAB
-  AMIKACIN: SIGNIFICANT CHANGE UP
-  AZTREONAM: SIGNIFICANT CHANGE UP
-  CEFEPIME: SIGNIFICANT CHANGE UP
-  CEFTAZIDIME: SIGNIFICANT CHANGE UP
-  CIPROFLOXACIN: SIGNIFICANT CHANGE UP
-  IMIPENEM: SIGNIFICANT CHANGE UP
-  LEVOFLOXACIN: SIGNIFICANT CHANGE UP
-  MEROPENEM: SIGNIFICANT CHANGE UP
-  PIPERACILLIN/TAZOBACTAM: SIGNIFICANT CHANGE UP
METHOD TYPE: SIGNIFICANT CHANGE UP

## 2024-11-22 PROCEDURE — 99233 SBSQ HOSP IP/OBS HIGH 50: CPT

## 2024-11-22 RX ORDER — MEROPENEM 1 G/30ML
1000 INJECTION INTRAVENOUS EVERY 8 HOURS
Refills: 0 | Status: DISCONTINUED | OUTPATIENT
Start: 2024-11-22 | End: 2024-11-23

## 2024-11-22 RX ORDER — MEROPENEM 1 G/30ML
1000 INJECTION INTRAVENOUS EVERY 8 HOURS
Refills: 0 | Status: DISCONTINUED | OUTPATIENT
Start: 2024-11-22 | End: 2024-11-22

## 2024-11-22 RX ADMIN — HEPARIN SODIUM 5000 UNIT(S): 1000 INJECTION INTRAVENOUS; SUBCUTANEOUS at 05:38

## 2024-11-22 RX ADMIN — CLONAZEPAM 0.5 MILLIGRAM(S): 0.5 TABLET ORAL at 22:12

## 2024-11-22 RX ADMIN — HEPARIN SODIUM 5000 UNIT(S): 1000 INJECTION INTRAVENOUS; SUBCUTANEOUS at 22:11

## 2024-11-22 RX ADMIN — MEROPENEM 1000 MILLIGRAM(S): 1 INJECTION INTRAVENOUS at 14:39

## 2024-11-22 RX ADMIN — Medication 50 MICROGRAM(S): at 05:36

## 2024-11-22 RX ADMIN — Medication 2000 UNIT(S): at 10:30

## 2024-11-22 RX ADMIN — CLONAZEPAM 0.5 MILLIGRAM(S): 0.5 TABLET ORAL at 20:14

## 2024-11-22 RX ADMIN — HEPARIN SODIUM 5000 UNIT(S): 1000 INJECTION INTRAVENOUS; SUBCUTANEOUS at 14:39

## 2024-11-22 RX ADMIN — ROSUVASTATIN CALCIUM 5 MILLIGRAM(S): 20 TABLET, FILM COATED ORAL at 22:10

## 2024-11-22 RX ADMIN — MEROPENEM 1000 MILLIGRAM(S): 1 INJECTION INTRAVENOUS at 22:10

## 2024-11-22 RX ADMIN — BUPROPION HYDROBROMIDE 300 MILLIGRAM(S): 522 TABLET, EXTENDED RELEASE ORAL at 10:30

## 2024-11-22 RX ADMIN — Medication 40 MILLIGRAM(S): at 22:11

## 2024-11-22 RX ADMIN — Medication 40 MILLIGRAM(S): at 06:47

## 2024-11-23 ENCOUNTER — TRANSCRIPTION ENCOUNTER (OUTPATIENT)
Age: 74
End: 2024-11-23

## 2024-11-23 VITALS
RESPIRATION RATE: 19 BRPM | TEMPERATURE: 98 F | SYSTOLIC BLOOD PRESSURE: 108 MMHG | OXYGEN SATURATION: 98 % | HEART RATE: 71 BPM | DIASTOLIC BLOOD PRESSURE: 66 MMHG

## 2024-11-23 LAB
-  AMPICILLIN: SIGNIFICANT CHANGE UP
-  CIPROFLOXACIN: SIGNIFICANT CHANGE UP
-  LEVOFLOXACIN: SIGNIFICANT CHANGE UP
-  NITROFURANTOIN: SIGNIFICANT CHANGE UP
-  TETRACYCLINE: SIGNIFICANT CHANGE UP
-  VANCOMYCIN: SIGNIFICANT CHANGE UP
CULTURE RESULTS: ABNORMAL
METHOD TYPE: SIGNIFICANT CHANGE UP
ORGANISM # SPEC MICROSCOPIC CNT: ABNORMAL
ORGANISM # SPEC MICROSCOPIC CNT: ABNORMAL
ORGANISM # SPEC MICROSCOPIC CNT: SIGNIFICANT CHANGE UP
SPECIMEN SOURCE: SIGNIFICANT CHANGE UP

## 2024-11-23 PROCEDURE — 99239 HOSP IP/OBS DSCHRG MGMT >30: CPT

## 2024-11-23 RX ORDER — CIPROFLOXACIN HCL 250 MG
1 TABLET ORAL
Qty: 10 | Refills: 0
Start: 2024-11-23 | End: 2024-11-27

## 2024-11-23 RX ORDER — TAMSULOSIN HYDROCHLORIDE 0.4 MG/1
2 CAPSULE ORAL
Qty: 60 | Refills: 3
Start: 2024-11-23 | End: 2025-03-22

## 2024-11-23 RX ADMIN — Medication 2000 UNIT(S): at 09:49

## 2024-11-23 RX ADMIN — Medication 50 MICROGRAM(S): at 05:46

## 2024-11-23 RX ADMIN — Medication 40 MILLIGRAM(S): at 06:52

## 2024-11-23 RX ADMIN — HEPARIN SODIUM 5000 UNIT(S): 1000 INJECTION INTRAVENOUS; SUBCUTANEOUS at 05:46

## 2024-11-23 RX ADMIN — BUPROPION HYDROBROMIDE 300 MILLIGRAM(S): 522 TABLET, EXTENDED RELEASE ORAL at 09:49

## 2024-11-23 RX ADMIN — MEROPENEM 1000 MILLIGRAM(S): 1 INJECTION INTRAVENOUS at 05:46

## 2024-11-23 RX ADMIN — MEROPENEM 1000 MILLIGRAM(S): 1 INJECTION INTRAVENOUS at 14:16

## 2024-11-25 ENCOUNTER — APPOINTMENT (OUTPATIENT)
Facility: CLINIC | Age: 74
End: 2024-11-25

## 2024-11-26 ENCOUNTER — APPOINTMENT (OUTPATIENT)
Dept: UROLOGY | Facility: CLINIC | Age: 74
End: 2024-11-26
Payer: MEDICARE

## 2024-11-26 DIAGNOSIS — R39.15 BENIGN PROSTATIC HYPERPLASIA WITH LOWER URINARY TRACT SYMPMS: ICD-10-CM

## 2024-11-26 DIAGNOSIS — N40.1 BENIGN PROSTATIC HYPERPLASIA WITH LOWER URINARY TRACT SYMPMS: ICD-10-CM

## 2024-11-26 DIAGNOSIS — N39.0 URINARY TRACT INFECTION, SITE NOT SPECIFIED: ICD-10-CM

## 2024-11-26 DIAGNOSIS — N28.1 CYST OF KIDNEY, ACQUIRED: ICD-10-CM

## 2024-11-26 PROCEDURE — 99214 OFFICE O/P EST MOD 30 MIN: CPT | Mod: 25

## 2024-11-26 PROCEDURE — 51702 INSERT TEMP BLADDER CATH: CPT

## 2024-11-26 PROCEDURE — A4216: CPT | Mod: NC

## 2024-11-26 RX ORDER — CIPROFLOXACIN HYDROCHLORIDE 250 MG/1
250 TABLET, FILM COATED ORAL
Qty: 4 | Refills: 0 | Status: ACTIVE | COMMUNITY
Start: 2024-11-26 | End: 1900-01-01

## 2024-12-02 ENCOUNTER — RX RENEWAL (OUTPATIENT)
Age: 74
End: 2024-12-02

## 2024-12-24 ENCOUNTER — APPOINTMENT (OUTPATIENT)
Dept: MRI IMAGING | Facility: CLINIC | Age: 74
End: 2024-12-24
Payer: COMMERCIAL

## 2024-12-24 ENCOUNTER — OUTPATIENT (OUTPATIENT)
Dept: OUTPATIENT SERVICES | Facility: HOSPITAL | Age: 74
LOS: 1 days | End: 2024-12-24
Payer: COMMERCIAL

## 2024-12-24 DIAGNOSIS — Z98.890 OTHER SPECIFIED POSTPROCEDURAL STATES: Chronic | ICD-10-CM

## 2024-12-24 DIAGNOSIS — Z41.9 ENCOUNTER FOR PROCEDURE FOR PURPOSES OTHER THAN REMEDYING HEALTH STATE, UNSPECIFIED: Chronic | ICD-10-CM

## 2024-12-24 DIAGNOSIS — M79.644 PAIN IN RIGHT FINGER(S): ICD-10-CM

## 2024-12-24 DIAGNOSIS — Z00.8 ENCOUNTER FOR OTHER GENERAL EXAMINATION: ICD-10-CM

## 2024-12-24 DIAGNOSIS — Z98.1 ARTHRODESIS STATUS: Chronic | ICD-10-CM

## 2024-12-24 PROCEDURE — 73218 MRI UPPER EXTREMITY W/O DYE: CPT | Mod: 26,RT

## 2024-12-24 PROCEDURE — 73218 MRI UPPER EXTREMITY W/O DYE: CPT

## 2025-01-07 ENCOUNTER — OFFICE (OUTPATIENT)
Dept: URBAN - METROPOLITAN AREA CLINIC 102 | Facility: CLINIC | Age: 75
Setting detail: OPHTHALMOLOGY
End: 2025-01-07
Payer: COMMERCIAL

## 2025-01-07 DIAGNOSIS — H01.002: ICD-10-CM

## 2025-01-07 DIAGNOSIS — H35.361: ICD-10-CM

## 2025-01-07 DIAGNOSIS — H90.3: ICD-10-CM

## 2025-01-07 DIAGNOSIS — H01.001: ICD-10-CM

## 2025-01-07 DIAGNOSIS — H40.033: ICD-10-CM

## 2025-01-07 PROCEDURE — 92557 COMPREHENSIVE HEARING TEST: CPT | Performed by: AUDIOLOGIST

## 2025-01-07 PROCEDURE — 92134 CPTRZ OPH DX IMG PST SGM RTA: CPT | Performed by: OPHTHALMOLOGY

## 2025-01-07 PROCEDURE — 92014 COMPRE OPH EXAM EST PT 1/>: CPT | Performed by: OPHTHALMOLOGY

## 2025-01-07 PROCEDURE — 92020 GONIOSCOPY: CPT | Performed by: OPHTHALMOLOGY

## 2025-01-07 ASSESSMENT — REFRACTION_CURRENTRX
OD_AXIS: 97
OS_ADD: +2.50
OD_VPRISM_DIRECTION: PROGS
OD_ADD: +2.25
OD_AXIS: 100
OD_CYLINDER: -2.00
OD_SPHERE: -0.25
OS_OVR_VA: 20/
OD_VPRISM_DIRECTION: PROGS
OD_OVR_VA: 20/
OS_OVR_VA: 20/
OS_VPRISM_DIRECTION: PROGS
OS_SPHERE: -0.25
OS_SPHERE: -1.50
OS_AXIS: 085
OS_VPRISM_DIRECTION: PROGS
OS_CYLINDER: -2.75
OS_CYLINDER: -2.75
OD_ADD: +2.50
OS_ADD: +2.25
OS_AXIS: 79
OD_SPHERE: -1.00
OD_CYLINDER: -1.75
OD_OVR_VA: 20/

## 2025-01-07 ASSESSMENT — REFRACTION_MANIFEST
OS_ADD: +2.50
OD_ADD: +2.50
OD_CYLINDER: -2.00
OS_AXIS: 085
OD_ADD: +2.50
OD_CYLINDER: -2.00
OS_ADD: +2.50
OD_AXIS: 100
OS_CYLINDER: -2.75
OS_CYLINDER: -2.75
OS_SPHERE: -0.50
OS_VA1: 20/30
OD_VA1: 20/20
OD_SPHERE: PLANO
OD_VA1: 20/20
OD_SPHERE: -0.25
OS_AXIS: 085
OS_VA1: 20/20
OD_AXIS: 100
OS_SPHERE: -0.75

## 2025-01-07 ASSESSMENT — CONFRONTATIONAL VISUAL FIELD TEST (CVF)
OS_FINDINGS: FULL
OD_FINDINGS: FULL

## 2025-01-07 ASSESSMENT — KERATOMETRY
OD_K2POWER_DIOPTERS: 43.50
OS_AXISANGLE_DEGREES: 148
OD_AXISANGLE_DEGREES: 009
OS_K2POWER_DIOPTERS: 44.75
METHOD_AUTO_MANUAL: AUTO
OD_K1POWER_DIOPTERS: 42.50
OS_K1POWER_DIOPTERS: 42.00

## 2025-01-07 ASSESSMENT — LID EXAM ASSESSMENTS
OD_BLEPHARITIS: RLL RUL T
OS_BLEPHARITIS: LLL LUL T

## 2025-01-07 ASSESSMENT — REFRACTION_AUTOREFRACTION
OS_AXIS: 071
OD_AXIS: 099
OD_CYLINDER: -2.25
OS_CYLINDER: -4.75
OS_SPHERE: +0.75
OD_SPHERE: -0.25

## 2025-01-07 ASSESSMENT — LID POSITION - DERMATOCHALASIS
OD_DERMATOCHALASIS: 1+ 2+
OS_DERMATOCHALASIS: 1+ 2+

## 2025-01-07 ASSESSMENT — VISUAL ACUITY
OD_BCVA: 20/30
OS_BCVA: 20/20+

## 2025-01-07 ASSESSMENT — TONOMETRY
OD_IOP_MMHG: 15
OD_IOP_MMHG: 16
OS_IOP_MMHG: 14
OS_IOP_MMHG: 16

## 2025-01-07 ASSESSMENT — DECREASING TEAR LAKE - SEVERITY SCORE
OS_DEC_TEARLAKE: 2+
OD_DEC_TEARLAKE: 2+

## 2025-01-27 ENCOUNTER — APPOINTMENT (OUTPATIENT)
Dept: VASCULAR SURGERY | Facility: CLINIC | Age: 75
End: 2025-01-27

## 2025-01-29 ENCOUNTER — APPOINTMENT (OUTPATIENT)
Dept: GASTROENTEROLOGY | Facility: CLINIC | Age: 75
End: 2025-01-29
Payer: MEDICARE

## 2025-01-29 VITALS
DIASTOLIC BLOOD PRESSURE: 62 MMHG | HEIGHT: 71 IN | BODY MASS INDEX: 24.5 KG/M2 | SYSTOLIC BLOOD PRESSURE: 110 MMHG | WEIGHT: 175 LBS

## 2025-01-29 DIAGNOSIS — K21.9 GASTRO-ESOPHAGEAL REFLUX DISEASE W/OUT ESOPHAGITIS: ICD-10-CM

## 2025-01-29 DIAGNOSIS — Z86.0100 PERSONAL HISTORY OF COLON POLYPS, UNSPECIFIED: ICD-10-CM

## 2025-01-29 DIAGNOSIS — Z12.11 ENCOUNTER FOR SCREENING FOR MALIGNANT NEOPLASM OF COLON: ICD-10-CM

## 2025-01-29 PROCEDURE — 99214 OFFICE O/P EST MOD 30 MIN: CPT

## 2025-01-29 RX ORDER — FAMOTIDINE 20 MG/1
20 TABLET, FILM COATED ORAL
Qty: 90 | Refills: 0 | Status: ACTIVE | COMMUNITY
Start: 2025-01-29 | End: 1900-01-01

## 2025-01-29 RX ORDER — PANTOPRAZOLE 40 MG/1
40 TABLET, DELAYED RELEASE ORAL
Qty: 90 | Refills: 3 | Status: ACTIVE | COMMUNITY
Start: 2025-01-29 | End: 1900-01-01

## 2025-02-03 PROBLEM — Z86.0100 HISTORY OF COLON POLYPS: Status: ACTIVE | Noted: 2025-02-03

## 2025-02-11 ENCOUNTER — APPOINTMENT (OUTPATIENT)
Dept: UROLOGY | Facility: CLINIC | Age: 75
End: 2025-02-11

## 2025-02-27 DIAGNOSIS — N40.1 BENIGN PROSTATIC HYPERPLASIA WITH LOWER URINARY TRACT SYMPMS: ICD-10-CM

## 2025-02-27 DIAGNOSIS — R39.15 BENIGN PROSTATIC HYPERPLASIA WITH LOWER URINARY TRACT SYMPMS: ICD-10-CM

## 2025-02-27 DIAGNOSIS — H60.311 DIFFUSE OTITIS EXTERNA, RIGHT EAR: ICD-10-CM

## 2025-02-27 RX ORDER — CIPROFLOXACIN AND DEXAMETHASONE 3; 1 MG/ML; MG/ML
0.3-0.1 SUSPENSION/ DROPS AURICULAR (OTIC)
Qty: 1 | Refills: 2 | Status: ACTIVE | COMMUNITY
Start: 2025-02-27 | End: 1900-01-01

## 2025-02-27 RX ORDER — TAMSULOSIN HYDROCHLORIDE 0.4 MG/1
0.4 CAPSULE ORAL
Qty: 90 | Refills: 2 | Status: ACTIVE | COMMUNITY
Start: 2025-02-27 | End: 1900-01-01

## 2025-03-25 NOTE — END OF VISIT
This patient was exposed to a person with a known CP- and has the potential for having acquired this pathogen of concern. The [place] Department of Health ([facility]) and CDC recommend that we screen this patient to prevent the spread of these organisms within our healthcare facility. Infection Prevention has placed orders for collecting a rectal swab for CP- to evaluate if this patient is now a carrier.   This patient was identified to have a low risk exposure in which case Contact Precautions are not necessary unless the patient tests positive.    Screening is voluntary.  Please notify Infection Prevention if the patient declines testing.  Additional information and resources can be found on the Infection Prevention [employer]point page.   [FreeTextEntry3] : I, Clif Bowers, acted solely as a scribe for Dr. Javon Gambino MD on this date 10/02/2019. \par \par All medical records entries made by the Scribe were at my, Dr. Javon Gambino MD, direction and personally dictated by me on 10/02/2019. I have personally reviewed the chart and agree that the record accurately reflects my personal performance of the history, physical exam, assessment, and plan. I have also personally directed, reviewed, and agreed with the chart.

## 2025-04-14 ENCOUNTER — APPOINTMENT (OUTPATIENT)
Dept: UROLOGY | Facility: CLINIC | Age: 75
End: 2025-04-14

## 2025-05-01 ENCOUNTER — RX RENEWAL (OUTPATIENT)
Age: 75
End: 2025-05-01

## 2025-05-13 ENCOUNTER — RESULT CHARGE (OUTPATIENT)
Age: 75
End: 2025-05-13

## 2025-05-13 ENCOUNTER — NON-APPOINTMENT (OUTPATIENT)
Age: 75
End: 2025-05-13

## 2025-05-13 ENCOUNTER — APPOINTMENT (OUTPATIENT)
Dept: INTERNAL MEDICINE | Facility: CLINIC | Age: 75
End: 2025-05-13
Payer: MEDICARE

## 2025-05-13 VITALS
RESPIRATION RATE: 15 BRPM | HEIGHT: 71 IN | BODY MASS INDEX: 24.64 KG/M2 | OXYGEN SATURATION: 97 % | HEART RATE: 72 BPM | WEIGHT: 176 LBS | TEMPERATURE: 97.7 F | DIASTOLIC BLOOD PRESSURE: 70 MMHG | SYSTOLIC BLOOD PRESSURE: 100 MMHG

## 2025-05-13 DIAGNOSIS — J98.4 OTHER DISORDERS OF LUNG: ICD-10-CM

## 2025-05-13 DIAGNOSIS — J45.991 COUGH VARIANT ASTHMA: ICD-10-CM

## 2025-05-13 DIAGNOSIS — J45.909 UNSPECIFIED ASTHMA, UNCOMPLICATED: ICD-10-CM

## 2025-05-13 PROCEDURE — 95012 NITRIC OXIDE EXP GAS DETER: CPT

## 2025-05-13 PROCEDURE — 99214 OFFICE O/P EST MOD 30 MIN: CPT | Mod: 25

## 2025-05-13 PROCEDURE — 94060 EVALUATION OF WHEEZING: CPT

## 2025-05-16 ENCOUNTER — APPOINTMENT (OUTPATIENT)
Dept: UROLOGY | Facility: CLINIC | Age: 75
End: 2025-05-16
Payer: MEDICARE

## 2025-05-16 VITALS
SYSTOLIC BLOOD PRESSURE: 123 MMHG | HEART RATE: 77 BPM | OXYGEN SATURATION: 99 % | RESPIRATION RATE: 16 BRPM | WEIGHT: 176 LBS | DIASTOLIC BLOOD PRESSURE: 73 MMHG | HEIGHT: 71 IN | BODY MASS INDEX: 24.64 KG/M2

## 2025-05-16 DIAGNOSIS — R39.15 BENIGN PROSTATIC HYPERPLASIA WITH LOWER URINARY TRACT SYMPMS: ICD-10-CM

## 2025-05-16 DIAGNOSIS — N40.1 BENIGN PROSTATIC HYPERPLASIA WITH LOWER URINARY TRACT SYMPMS: ICD-10-CM

## 2025-05-16 DIAGNOSIS — N28.1 CYST OF KIDNEY, ACQUIRED: ICD-10-CM

## 2025-05-16 PROCEDURE — G2211 COMPLEX E/M VISIT ADD ON: CPT

## 2025-05-16 PROCEDURE — 99214 OFFICE O/P EST MOD 30 MIN: CPT

## 2025-05-16 RX ORDER — TAMSULOSIN HYDROCHLORIDE 0.4 MG/1
0.4 CAPSULE ORAL
Qty: 180 | Refills: 3 | Status: ACTIVE | COMMUNITY
Start: 2025-05-16 | End: 1900-01-01

## 2025-07-09 ENCOUNTER — OFFICE (OUTPATIENT)
Dept: URBAN - METROPOLITAN AREA CLINIC 102 | Facility: CLINIC | Age: 75
Setting detail: OPHTHALMOLOGY
End: 2025-07-09
Payer: COMMERCIAL

## 2025-07-09 DIAGNOSIS — H01.001: ICD-10-CM

## 2025-07-09 DIAGNOSIS — H40.033: ICD-10-CM

## 2025-07-09 DIAGNOSIS — H35.361: ICD-10-CM

## 2025-07-09 DIAGNOSIS — H01.002: ICD-10-CM

## 2025-07-09 PROCEDURE — 92134 CPTRZ OPH DX IMG PST SGM RTA: CPT | Performed by: OPHTHALMOLOGY

## 2025-07-09 PROCEDURE — 92020 GONIOSCOPY: CPT | Performed by: OPHTHALMOLOGY

## 2025-07-09 PROCEDURE — 92014 COMPRE OPH EXAM EST PT 1/>: CPT | Performed by: OPHTHALMOLOGY

## 2025-07-09 ASSESSMENT — VISUAL ACUITY
OS_BCVA: 20/25
OD_BCVA: 20/30

## 2025-07-09 ASSESSMENT — KERATOMETRY
OS_AXISANGLE_DEGREES: 002
OS_K1POWER_DIOPTERS: 43.75
OD_AXISANGLE_DEGREES: 009
OS_K2POWER_DIOPTERS: 44.50
OD_K2POWER_DIOPTERS: 43.75
METHOD_AUTO_MANUAL: AUTO
OD_K1POWER_DIOPTERS: 42.50

## 2025-07-09 ASSESSMENT — REFRACTION_CURRENTRX
OD_OVR_VA: 20/
OD_AXIS: 100
OS_AXIS: 085
OD_ADD: +2.50
OS_ADD: +2.50
OD_OVR_VA: 20/
OD_SPHERE: -1.00
OD_SPHERE: -0.25
OS_VPRISM_DIRECTION: PROGS
OS_AXIS: 79
OS_VPRISM_DIRECTION: PROGS
OD_CYLINDER: -1.75
OD_VPRISM_DIRECTION: PROGS
OS_SPHERE: -0.25
OS_SPHERE: -1.50
OD_AXIS: 97
OS_ADD: +2.25
OD_CYLINDER: -2.00
OD_VPRISM_DIRECTION: PROGS
OS_OVR_VA: 20/
OS_CYLINDER: -2.75
OD_ADD: +2.25
OS_OVR_VA: 20/
OS_CYLINDER: -2.75

## 2025-07-09 ASSESSMENT — TONOMETRY
OD_IOP_MMHG: 12
OS_IOP_MMHG: 13

## 2025-07-09 ASSESSMENT — REFRACTION_MANIFEST
OS_VA1: 20/30
OD_SPHERE: PLANO
OD_SPHERE: -0.25
OD_CYLINDER: -2.00
OS_CYLINDER: -2.75
OD_CYLINDER: -2.00
OS_SPHERE: -0.50
OD_VA1: 20/20
OS_SPHERE: -0.75
OD_AXIS: 100
OD_ADD: +2.50
OS_AXIS: 085
OS_AXIS: 085
OS_ADD: +2.50
OS_VA1: 20/20
OD_AXIS: 100
OS_ADD: +2.50
OD_VA1: 20/20
OS_CYLINDER: -2.75
OD_ADD: +2.50

## 2025-07-09 ASSESSMENT — CONFRONTATIONAL VISUAL FIELD TEST (CVF)
OD_FINDINGS: FULL
OS_FINDINGS: FULL

## 2025-07-09 ASSESSMENT — REFRACTION_AUTOREFRACTION
OD_AXIS: 103
OS_AXIS: 91
OD_SPHERE: -0.25
OS_CYLINDER: -2.75
OS_SPHERE: -1.00
OD_CYLINDER: -2.25

## 2025-07-09 ASSESSMENT — LID EXAM ASSESSMENTS
OS_BLEPHARITIS: LLL LUL T
OD_BLEPHARITIS: RLL RUL T

## 2025-07-09 ASSESSMENT — LID POSITION - DERMATOCHALASIS
OD_DERMATOCHALASIS: 1+ 2+
OS_DERMATOCHALASIS: 1+ 2+

## 2025-07-09 ASSESSMENT — DECREASING TEAR LAKE - SEVERITY SCORE
OS_DEC_TEARLAKE: 2+
OD_DEC_TEARLAKE: 2+

## 2025-07-23 ENCOUNTER — APPOINTMENT (OUTPATIENT)
Dept: DERMATOLOGY | Facility: CLINIC | Age: 75
End: 2025-07-23
Payer: MEDICARE

## 2025-07-23 DIAGNOSIS — L82.0 INFLAMED SEBORRHEIC KERATOSIS: ICD-10-CM

## 2025-07-23 DIAGNOSIS — L81.4 OTHER MELANIN HYPERPIGMENTATION: ICD-10-CM

## 2025-07-23 DIAGNOSIS — D18.01 HEMANGIOMA OF SKIN AND SUBCUTANEOUS TISSUE: ICD-10-CM

## 2025-07-23 PROCEDURE — 99213 OFFICE O/P EST LOW 20 MIN: CPT | Mod: 25

## 2025-07-23 PROCEDURE — 17110 DESTRUCTION B9 LES UP TO 14: CPT

## 2025-09-03 ENCOUNTER — TRANSCRIPTION ENCOUNTER (OUTPATIENT)
Age: 75
End: 2025-09-03

## 2025-09-03 DIAGNOSIS — R05.3 CHRONIC COUGH: ICD-10-CM

## 2025-09-03 DIAGNOSIS — J45.991 COUGH VARIANT ASTHMA: ICD-10-CM
